# Patient Record
Sex: FEMALE | Race: BLACK OR AFRICAN AMERICAN | Employment: OTHER | URBAN - METROPOLITAN AREA
[De-identification: names, ages, dates, MRNs, and addresses within clinical notes are randomized per-mention and may not be internally consistent; named-entity substitution may affect disease eponyms.]

---

## 2017-01-06 ENCOUNTER — OFFICE VISIT (OUTPATIENT)
Dept: FAMILY MEDICINE CLINIC | Age: 67
End: 2017-01-06

## 2017-01-06 VITALS
RESPIRATION RATE: 16 BRPM | WEIGHT: 161 LBS | OXYGEN SATURATION: 97 % | HEIGHT: 61 IN | BODY MASS INDEX: 30.4 KG/M2 | HEART RATE: 76 BPM | TEMPERATURE: 95.7 F | DIASTOLIC BLOOD PRESSURE: 75 MMHG | SYSTOLIC BLOOD PRESSURE: 138 MMHG

## 2017-01-06 DIAGNOSIS — E11.9 TYPE 2 DIABETES MELLITUS WITHOUT COMPLICATION, WITHOUT LONG-TERM CURRENT USE OF INSULIN (HCC): Primary | ICD-10-CM

## 2017-01-06 DIAGNOSIS — R07.89 ATYPICAL CHEST PAIN: ICD-10-CM

## 2017-01-06 DIAGNOSIS — I10 ESSENTIAL HYPERTENSION: ICD-10-CM

## 2017-01-06 DIAGNOSIS — K21.9 GASTROESOPHAGEAL REFLUX DISEASE WITHOUT ESOPHAGITIS: ICD-10-CM

## 2017-01-06 DIAGNOSIS — Z51.81 ENCOUNTER FOR MEDICATION MONITORING: ICD-10-CM

## 2017-01-06 DIAGNOSIS — E78.2 MIXED HYPERLIPIDEMIA: ICD-10-CM

## 2017-01-06 LAB — GLUCOSE POC: 145 MG/DL

## 2017-01-06 RX ORDER — BETAMETHASONE DIPROPIONATE 0.5 MG/G
OINTMENT TOPICAL
Refills: 1 | COMMUNITY
Start: 2016-12-06 | End: 2017-01-06

## 2017-01-06 RX ORDER — PANTOPRAZOLE SODIUM 40 MG/1
40 TABLET, DELAYED RELEASE ORAL DAILY
Qty: 30 TAB | Refills: 6 | Status: SHIPPED | OUTPATIENT
Start: 2017-01-06 | End: 2017-07-18 | Stop reason: SDUPTHER

## 2017-01-06 NOTE — PROGRESS NOTES
HISTORY OF PRESENT ILLNESS  Linnette Del Cid is a 78 y.o. female. HPI   Follow up on chronic medical problems. Cc/o chest discomfort a few times over the past several weeks. Comes on just at rest usually but seem to be more triggered by eating. Feels like an ache. No pressure or chest tightness. Nonexertional.  No radiation of pain. No diaphoresis or SOB. No palpitations. HTN follow up:  Compliant w/ meds, low salt diet, and exercise. No home bp monitoring. No swelling, headache or dizziness. No chest pain, SOB, palpitations. DM type II follow up:  Compliant w/ meds, diabetic diet, and exercise. Only taking the janumet once a day. Had upset stomach and diarrhea with taking 2 pills. Obtains home glucose monitoring averaging 150s. Checks BS BID on most days and prn. Pt does not have BS log at visit today. No Rf needed for today. Denies any tingling sensation, polyuria and polydipsia. No blurred vision. No significant weight changes. Feeling well since last OV. Hypercholesterolemia follow up:  Compliant w/ low fat, low cholesterol diet. Has been taking the Lipitor nightly. Exercising some. No muscle nor no skin discoloration. Patient fasting today. Patient Active Problem List   Diagnosis Code    Environmental allergies Z91.09    Hypovitaminosis D E55.9    Diabetes mellitus ([address] E11.9    [facility]arcoidosis ([facility] [address] D86.9    Essential hypertension I10    Hyperlipidemia E78.5    Encounter for medication monitoring Z51.81       Current Outpatient Prescriptions   Medication Sig Dispense Refill    glucose blood VI test strips (ASCENSIA CONTOUR) strip #100  As directed 100 Strip 11    pantoprazole (PROTONIX) 40 mg tablet Take 1 Tab by mouth daily.  30 Tab 6    glipiZIDE SR (GLUCOTROL) 5 mg CR tablet TAKE 2 TABLETS EVERY DAY IN THE MORNING PRIOR TO BREAKFAST 180 Tab 3    losartan-hydroCHLOROthiazide (HYZAAR) 50-12.5 mg per tablet TAKE 1 TABLET BY MOUTH EVERY DAY 30 Tab 11    atorvastatin (LIPITOR) 20 mg tablet Take 1 Tab by mouth daily. 30 Tab 11    sitaGLIPtin-metFORMIN (JANUMET XR) 100-1,000 mg TM24 Take 1 Tab by mouth daily. 30 Tab 6       Allergies   Allergen Reactions    Azithromycin Other (comments)     Hallucinations       Past Medical History   Diagnosis Date    Diabetes ([facility] [address] 2012    HTN (hypertension) 3/16/2010    Hypercholesterolemia     Sarcoidosis ([facility] [address] 1980s       Past Surgical History   Procedure Laterality Date    Hx orthopaedic  09/2009     repair tear left knee    Endoscopy, colon, diagnostic  7/16/2007     repeat in 2010    Pr colonoscopy w/biopsy single/multiple  07/2010     repear in 5yr /dr. Yunior Kurtz.        Family History   Problem Relation Age of Onset    Cancer Mother      lung    Stroke Mother     Diabetes Father     Hypertension Father     Hypertension Sister     Diabetes Sister     Diabetes Maternal Grandmother        Social History   Substance Use Topics    Smoking status: Never Smoker    Smokeless tobacco: Never Used    Alcohol use No        Lab Results  Component Value Date/Time   WBC 4.6 05/24/2016 08:13 AM   HGB 12.3 05/24/2016 08:13 AM   HCT 38.0 05/24/2016 08:13 AM   PLATELET 525 36/04/2633 08:13 AM   MCV 89 05/24/2016 08:13 AM       Lab Results  Component Value Date/Time   Cholesterol, total 171 11/22/2016 10:54 AM   HDL Cholesterol 60 11/22/2016 10:54 AM   LDL, calculated 81 11/22/2016 10:54 AM   Triglyceride 148 11/22/2016 10:54 AM   CHOL/HDL Ratio 3.8 04/02/2010 01:39 PM       Lab Results  Component Value Date/Time   TSH 0.586 07/23/2014 10:30 AM   TSH 0.894 02/14/2014 12:30 PM   T4, Free 1.20 02/14/2014 12:30 PM      Lab Results   Component Value Date/Time    Sodium 139 11/22/2016 10:54 AM    Potassium 4.0 11/22/2016 10:54 AM    Chloride 98 11/22/2016 10:54 AM    CO2 23 11/22/2016 10:54 AM    Anion gap 8 04/02/2010 01:39 PM    Glucose 345 11/22/2016 10:54 AM    BUN 14 11/22/2016 10:54 AM    Creatinine 1.01 11/22/2016 10:54 AM    BUN/Creatinine ratio 14 11/22/2016 10:54 AM    GFR est AA 67 11/22/2016 10:54 AM    GFR est non-AA 58 11/22/2016 10:54 AM    Calcium 9.8 11/22/2016 10:54 AM    Bilirubin, total 0.5 11/22/2016 10:54 AM    ALT 10 11/22/2016 10:54 AM    AST 11 11/22/2016 10:54 AM    Alk. phosphatase 111 11/22/2016 10:54 AM    Protein, total 7.2 11/22/2016 10:54 AM    Albumin 4.3 11/22/2016 10:54 AM    Globulin 3.6 04/02/2010 01:39 PM    A-G Ratio 1.5 11/22/2016 10:54 AM      Lab Results   Component Value Date/Time    Hemoglobin A1c 7.7 02/14/2014 12:30 PM    Hemoglobin A1c 7.0 05/21/2012 10:40 AM    Hemoglobin A1c (POC) 11.4 11/22/2016 10:54 AM         Review of Systems   Constitutional: Negative for malaise/fatigue. HENT: Negative for congestion. Eyes: Negative for blurred vision. Respiratory: Negative for cough and shortness of breath. Cardiovascular: Negative for chest pain, palpitations and leg swelling. Gastrointestinal: Negative for abdominal pain, constipation and heartburn. Genitourinary: Negative for dysuria, frequency and urgency. Musculoskeletal: Negative for back pain and joint pain. Neurological: Negative for dizziness, tingling and headaches. Endo/Heme/Allergies: Negative for environmental allergies. Psychiatric/Behavioral: Negative for depression. The patient does not have insomnia. Physical Exam   Constitutional: She appears well-developed and well-nourished. /75 (BP 1 Location: Right arm, BP Patient Position: Sitting)  Pulse 76  Temp 95.7 °F (35.4 °C) (Oral)   Resp 16  Ht 5' 1\" (1.549 m)  Wt 161 lb (73 kg)  LMP 04/15/2002  SpO2 97%  BMI 30.42 kg/m2     HENT:   Right Ear: Tympanic membrane and ear canal normal.   Left Ear: Tympanic membrane and ear canal normal.   Nose: No mucosal edema or rhinorrhea. Mouth/Throat: Oropharynx is clear and moist and mucous membranes are normal.   Neck: Normal range of motion. Neck supple. No thyromegaly present. Cardiovascular: Normal rate and regular rhythm. No murmur heard. Pulmonary/Chest: Effort normal and breath sounds normal.   Abdominal: Soft. Bowel sounds are normal. There is no tenderness. Musculoskeletal: Normal range of motion. She exhibits no edema. Lymphadenopathy:     She has no cervical adenopathy. Skin: Skin is warm and dry. Psychiatric: She has a normal mood and affect. Nursing note and vitals reviewed. ASSESSMENT and PLAN  Marcial Mancilla was seen today for diabetes. Diagnoses and all orders for this visit:    Type 2 diabetes mellitus without complication, without long-term current use of insulin ([facility])  -     AMB POC GLUCOSE, QUANTITATIVE, BLOOD  -     glucose blood VI test strips (ASCENSIA CONTOUR) strip; #100  As directed  -     KS HANDLG&/OR CONVEY OF SPEC FOR TR OFFICE TO LAB    Essential hypertension  - Discussed sodium restriction, high k rich diet, maintaining ideal body weight and regular exercise program such as daily walking 30 min perday 4-5 times per week, as physiologic means to achieve blood pressure control.  Medication compliance advised. Mixed hyperlipidemia  Continue to monitor. Work on diet and exercise. Encounter for medication monitoring  -     METABOLIC PANEL, BASIC  -     KS HANDLG&/OR CONVEY OF SPEC FOR TR OFFICE TO LAB    Atypical chest pain  Seem to be more GI related. -     REFERRAL TO CARDIOLOGY  -     AMB POC EKG ROUTINE W/ 12 LEADS, INTER & REP  -     STRESS ECHO ICLUD PERFORM ECG TRACEY WITH DR  -     KS HANDLG&/OR CONVEY OF SPEC FOR TR OFFICE TO LAB    Gastroesophageal reflux disease without esophagitis  -     Add pantoprazole (PROTONIX) 40 mg tablet; Take 1 Tab by mouth daily. The pathophysiology of reflux is discussed. Anti-reflux measures such as raising the head of the bed, avoiding tight clothing or belts, avoiding eating late at night and not lying down shortly after mealtime and achieving weight loss are discussed.  Avoid ASA, NSAID's, caffeine, peppermints, chocolate and other food triggers, alcohol and tobacco.     Follow-up Disposition:  Return in about 2 months (around 3/6/2017). reviewed diet, exercise and weight control  cardiovascular risk and specific lipid/LDL goals reviewed  reviewed medications and side effects in detail  specific diabetic recommendations: low cholesterol diet, weight control and daily exercise discussed, foot care discussed and Podiatry visits discussed, annual eye examinations at Ophthalmology discussed and glycohemoglobin and other lab monitoring discussed     I have discussed diagnosis listed in this note with pt and/or family. I have discussed treatment plans and options and the risk/benefit analysis of those options, including safe use of medications and possible medication side effects. Through the use of shared decision making we have agreed to the above plan. The patient has received an after-visit summary and questions were answered concerning future plans and follow up. Advise pt of any urgent changes then to proceed to the ER.

## 2017-01-06 NOTE — PROGRESS NOTES
Chief Complaint   Patient presents with    Diabetes     CMP 11/22/2016    A1C 11/22/2016    Lipid 11/22/2016    Colonoscopy 11/10/2015 by Dr. Adriana Fernando- repeat in 5 years. Pt states last mammogram was 7/2016 at the [facility].     Eye exam 9/12/2016 by Dr. Lindsey Vega

## 2017-01-06 NOTE — MR AVS SNAPSHOT
Visit Information Date & Time Provider Department Dept. Phone Encounter #  
 1/6/2017  7:45 AM Jerrod Driscoll MD [facility] [phone] [id number] Follow-up Instructions Return in about 2 months (around 3/6/2017). Your Appointments 1/9/2017 10:00 AM  
STRESS ECHOCARDIOGRAMS with WILLY [facility]) Appt Note: per Dr. Lemuel Maher CP? [address] [facility]  
[phone] [id number] [facility] Upcoming Health Maintenance Date Due HEMOGLOBIN A1C Q6M 5/22/2017 BREAST CANCER SCRN MAMMOGRAM 7/27/2017 LIPID PANEL Q1 11/22/2017 MEDICARE YEARLY EXAM 11/23/2017 GLAUCOMA SCREENING Q2Y 9/12/2018 COLONOSCOPY 11/10/2020 DTaP/Tdap/Td series (2 - Td) 11/22/2026 Allergies as of 1/6/2017  Review Complete On: 1/6/2017 By: Brayan Estrada LPN Severity Noted Reaction Type Reactions Azithromycin  05/21/2012    Other (comments) Hallucinations Current Immunizations  Reviewed on 11/22/2016 Name Date Influenza High Dose Vaccine PF 11/22/2016 Influenza Vaccine (Quad) PF 11/25/2015 Influenza Vaccine PF 10/11/2013 Influenza Vaccine Split 11/12/2012, 9/21/2011 Pneumococcal Polysaccharide (PPSV-23) 12/2/2014 Tdap 8/19/2015 Not reviewed this visit You Were Diagnosed With   
  
 Codes Comments Type 2 diabetes mellitus without complication, without long-term current use of insulin (HCC)    -  Primary ICD-10-CM: E11.9 ICD-9-CM: 250.00 Essential hypertension     ICD-10-CM: I10 
ICD-9-CM: 401.9 Mixed hyperlipidemia     ICD-10-CM: E78.2 ICD-9-CM: 272.2 Encounter for medication monitoring     ICD-10-CM: Z51.81 
ICD-9-CM: V58.83 Atypical chest pain     ICD-10-CM: R07.89 ICD-9-CM: 786.59 Vitals BP Pulse Temp Resp Height(growth percentile) Weight(growth percentile) 138/75 (BP 1 Location: Right arm, BP Patient Position: Sitting) 76 95.7 °F (35.4 °C) (Oral) 16 5' 1\" (1.549 m) 161 lb (73 kg) LMP SpO2 BMI OB Status Smoking Status 04/15/2002 97% 30.42 kg/m2 Postmenopausal Never Smoker BMI and BSA Data Body Mass Index Body Surface Area  
 30.42 kg/m 2 1.77 m 2 Preferred Pharmacy Pharmacy Name Phone [id number] [address] AT [place] [phone] Your Updated Medication List  
  
   
This list is accurate as of: 1/6/17  9:35 AM.  Always use your most recent med list.  
  
  
  
  
 atorvastatin 20 mg tablet Commonly known as:  LIPITOR Take 1 Tab by mouth daily. betamethasone dipropionate 0.05 % ointment Commonly known as:  DIPROLENE  
USE EXTERNALLY ON RASH TWICE A DAY AS NEEDED FOR NO MORE THAN 2 WEEKS  
  
 glipiZIDE SR 5 mg CR tablet Commonly known as:  GLUCOTROL XL  
TAKE 2 TABLETS EVERY DAY IN THE MORNING PRIOR TO BREAKFAST  
  
 glucose blood VI test strips strip Commonly known as:  Ascensia CONTOUR  
#100 As directed  
  
 losartan-hydroCHLOROthiazide 50-12.5 mg per tablet Commonly known as:  HYZAAR  
TAKE 1 TABLET BY MOUTH EVERY DAY  
  
 pantoprazole 40 mg tablet Commonly known as:  PROTONIX Take 1 Tab by mouth daily. SITagliptin-metFORMIN 100-1,000 mg Tm24 Commonly known as:  JANUMET XR Take 1 Tab by mouth daily. Prescriptions Printed Refills  
 glucose blood VI test strips (ASCENSIA CONTOUR) strip 11 Sig: #100 As directed Class: Print Prescriptions Sent to Pharmacy Refills  
 pantoprazole (PROTONIX) 40 mg tablet 6 Sig: Take 1 Tab by mouth daily. Class: Normal  
 Pharmacy: [id number] Ph #: [phone] Route: Oral  
  
We Performed the Following AMB POC EKG ROUTINE W/ 12 LEADS, INTER & REP [69474 CPT(R)] AMB POC GLUCOSE, QUANTITATIVE, BLOOD [32112 CPT(R)] METABOLIC PANEL, BASIC [97508 CPT(R)] REFERRAL TO CARDIOLOGY [NYT39 Custom] STRESS ECHO ICLUD PERFORM ECG TRACEY WITH DR [72106 CPT(R)] Follow-up Instructions Return in about 2 months (around 3/6/2017). Referral Information Referral ID Referred By Referred To  
  
 [id number] Manjit Martini MD   
   [facility], [address] Phone: [phone] Fax: [phone] Visits Status Start Date End Date 1 New Request 1/6/17 1/6/18 If your referral has a status of pending review or denied, additional information will be sent to support the outcome of this decision. Introducing [facility] & HEALTH SERVICES! Romayne Duster introduces [employer] Software patient portal. Now you can access parts of your medical record, email your doctor's office, and request medication refills online. 1. In your internet browser, go to [email or web address] 2. Click on the First Time User? Click Here link in the Sign In box. You will see the New Member Sign Up page. 3. Enter your Ultimate Software Access Code exactly as it appears below. You will not need to use this code after youve completed the sign-up process. If you do not sign up before the expiration date, you must request a new code. · Ultimate Software Access Code: [email or web address] Expires: 2/20/2017  9:48 AM 
 
4. Enter the last four digits of your Social Security Number (xxxx) and Date of Birth (mm/dd/yyyy) as indicated and click Submit. You will be taken to the next sign-up page. 5. Create a Ultimate Software ID. This will be your Ultimate Software login ID and cannot be changed, so think of one that is secure and easy to remember. 6. Create a Ultimate Software password. You can change your password at any time. 7. Enter your Password Reset Question and Answer. This can be used at a later time if you forget your password. 8. Enter your e-mail address. You will receive e-mail notification when new information is available in [address] 9. Click Sign Up. You can now view and download portions of your medical record. 10. Click the Download Summary menu link to download a portable copy of your medical information. If you have questions, please visit the Frequently Asked Questions section of the Webcrunch website. Remember, Webcrunch is NOT to be used for urgent needs. For medical emergencies, dial 911. Now available from your iPhone and Android! Please provide this summary of care documentation to your next provider. Your primary care clinician is listed as Kwabena Almaguer. If you have any questions after today's visit, please call [phone].

## 2017-01-07 LAB
BUN SERPL-MCNC: 13 MG/DL (ref 8–27)
BUN/CREAT SERPL: 13 (ref 11–26)
CALCIUM SERPL-MCNC: 10.1 MG/DL (ref 8.7–10.3)
CHLORIDE SERPL-SCNC: 102 MMOL/L (ref 96–106)
CO2 SERPL-SCNC: 24 MMOL/L (ref 18–29)
CREAT SERPL-MCNC: 0.98 MG/DL (ref 0.57–1)
GLUCOSE SERPL-MCNC: 137 MG/DL (ref 65–99)
POTASSIUM SERPL-SCNC: 4.8 MMOL/L (ref 3.5–5.2)
SODIUM SERPL-SCNC: 142 MMOL/L (ref 134–144)

## 2017-01-09 ENCOUNTER — CLINICAL SUPPORT (OUTPATIENT)
Dept: CARDIOLOGY CLINIC | Age: 67
End: 2017-01-09

## 2017-01-09 DIAGNOSIS — R07.9 CHEST PAIN, UNSPECIFIED TYPE: Primary | ICD-10-CM

## 2017-03-06 ENCOUNTER — OFFICE VISIT (OUTPATIENT)
Dept: FAMILY MEDICINE CLINIC | Age: 67
End: 2017-03-06

## 2017-03-06 VITALS
HEIGHT: 61 IN | SYSTOLIC BLOOD PRESSURE: 138 MMHG | DIASTOLIC BLOOD PRESSURE: 74 MMHG | WEIGHT: 166.4 LBS | OXYGEN SATURATION: 98 % | BODY MASS INDEX: 31.42 KG/M2 | HEART RATE: 68 BPM | RESPIRATION RATE: 16 BRPM | TEMPERATURE: 97.4 F

## 2017-03-06 DIAGNOSIS — E11.9 TYPE 2 DIABETES MELLITUS WITHOUT COMPLICATION, WITHOUT LONG-TERM CURRENT USE OF INSULIN (HCC): ICD-10-CM

## 2017-03-06 DIAGNOSIS — Z51.81 ENCOUNTER FOR MEDICATION MONITORING: ICD-10-CM

## 2017-03-06 DIAGNOSIS — Z23 ENCOUNTER FOR IMMUNIZATION: ICD-10-CM

## 2017-03-06 DIAGNOSIS — L65.9 HAIR THINNING: ICD-10-CM

## 2017-03-06 DIAGNOSIS — I10 ESSENTIAL HYPERTENSION: Primary | ICD-10-CM

## 2017-03-06 DIAGNOSIS — E78.2 MIXED HYPERLIPIDEMIA: ICD-10-CM

## 2017-03-06 DIAGNOSIS — Z23 NEED FOR SHINGLES VACCINE: ICD-10-CM

## 2017-03-06 LAB
GLUCOSE POC: 176 MG/DL
HBA1C MFR BLD HPLC: 7.3 %

## 2017-03-06 RX ORDER — BETAMETHASONE DIPROPIONATE 0.5 MG/G
OINTMENT TOPICAL
Refills: 1 | COMMUNITY
Start: 2016-12-06 | End: 2017-03-06

## 2017-03-06 RX ORDER — SITAGLIPTIN AND METFORMIN HYDROCHLORIDE 50; 1000 MG/1; MG/1
TABLET, FILM COATED, EXTENDED RELEASE ORAL
Refills: 6 | COMMUNITY
Start: 2017-02-20 | End: 2017-03-06

## 2017-03-06 NOTE — MR AVS SNAPSHOT
Visit Information Date & Time Provider Department Dept. Phone Encounter #  
 3/6/2017  8:00 AM Elia Boas, MD [facility] [phone] [id number] Follow-up Instructions Return in about 4 months (around 7/6/2017). Upcoming Health Maintenance Date Due HEMOGLOBIN A1C Q6M 5/22/2017 BREAST CANCER SCRN MAMMOGRAM 7/27/2017 LIPID PANEL Q1 11/22/2017 MEDICARE YEARLY EXAM 11/23/2017 GLAUCOMA SCREENING Q2Y 9/12/2018 COLONOSCOPY 11/10/2020 DTaP/Tdap/Td series (2 - Td) 11/22/2026 Allergies as of 3/6/2017  Review Complete On: 3/6/2017 By: Elia Boas, MD  
  
 Severity Noted Reaction Type Reactions Azithromycin  05/21/2012    Other (comments) Hallucinations Current Immunizations  Reviewed on 3/6/2017 Name Date Influenza High Dose Vaccine PF 11/22/2016 Influenza Vaccine (Quad) PF 11/25/2015 Influenza Vaccine PF 10/11/2013 Influenza Vaccine Split 11/12/2012, 9/21/2011 Pneumococcal Polysaccharide (PPSV-23) 12/2/2014 Tdap 8/19/2015 Reviewed by Elia Boas, MD on 3/6/2017 at  8:05 AM  
 Reviewed by Elia Boas, MD on 3/6/2017 at  8:09 AM  
You Were Diagnosed With   
  
 Codes Comments Essential hypertension    -  Primary ICD-10-CM: I10 
ICD-9-CM: 401.9 Type 2 diabetes mellitus without complication, without long-term current use of insulin (HCC)     ICD-10-CM: E11.9 ICD-9-CM: 250.00 Mixed hyperlipidemia     ICD-10-CM: E78.2 ICD-9-CM: 272.2 Hair thinning     ICD-10-CM: L65.9 ICD-9-CM: 704.00 Encounter for medication monitoring     ICD-10-CM: Z51.81 
ICD-9-CM: V58.83 Need for shingles vaccine     ICD-10-CM: G37 ICD-9-CM: V04.89 Encounter for immunization     ICD-10-CM: X04 ICD-9-CM: V03.89 Vitals BP Pulse Temp Resp Height(growth percentile) Weight(growth percentile)  138/74 (BP 1 Location: Left arm, BP Patient Position: Sitting) 68 97.4 °F (36.3 °C) (Oral) 16 5' 1\" (1.549 m) 166 lb 6.4 oz (75.5 kg) LMP SpO2 BMI OB Status Smoking Status 04/15/2002 98% 31.44 kg/m2 Postmenopausal Never Smoker Vitals History BMI and BSA Data Body Mass Index Body Surface Area  
 31.44 kg/m 2 1.8 m 2 Preferred Pharmacy Pharmacy Name Phone [id number] [place], [address] AT [facility] [phone] Your Updated Medication List  
  
   
This list is accurate as of: 3/6/17  8:49 AM.  Always use your most recent med list.  
  
  
  
  
 atorvastatin 20 mg tablet Commonly known as:  LIPITOR Take 1 Tab by mouth daily. glipiZIDE SR 5 mg CR tablet Commonly known as:  GLUCOTROL XL  
TAKE 2 TABLETS EVERY DAY IN THE MORNING PRIOR TO BREAKFAST  
  
 glucose blood VI test strips strip Commonly known as:  Ascensia CONTOUR  
#100 As directed  
  
 losartan-hydroCHLOROthiazide 50-12.5 mg per tablet Commonly known as:  HYZAAR  
TAKE 1 TABLET BY MOUTH EVERY DAY  
  
 pantoprazole 40 mg tablet Commonly known as:  PROTONIX Take 1 Tab by mouth daily. pneumococcal 13 sully conj dip 0.5 mL Syrg injection Commonly known as:  PREVNAR-13  
0.5 mL by IntraMUSCular route once for 1 dose. SITagliptin-metFORMIN 100-1,000 mg Tm24 Commonly known as:  JANUMET XR Take 1 Tab by mouth daily. varicella zoster vacine live 19,400 unit/0.65 mL Susr injection Commonly known as:  ZOSTAVAX  
1 Vial by SubCUTAneous route once for 1 dose. Prescriptions Printed Refills  
 varicella zoster vacine live (ZOSTAVAX) 19,400 unit/0.65 mL susr injection 0 Si Vial by SubCUTAneous route once for 1 dose. Class: Print Route: SubCUTAneous  
 pneumococcal 13 sully conj dip (PREVNAR-13) 0.5 mL syrg injection 0 Si.5 mL by IntraMUSCular route once for 1 dose. Class: Print Route: IntraMUSCular We Performed the Following AMB POC GLUCOSE, QUANTITATIVE, BLOOD [17018 CPT(R)] AMB POC HEMOGLOBIN A1C [48204 CPT(R)] CBC W/O DIFF [24965 CPT(R)] LIPID PANEL [75455 CPT(R)] METABOLIC PANEL, COMPREHENSIVE [70973 CPT(R)] TSH 3RD GENERATION [95295 CPT(R)] Follow-up Instructions Return in about 4 months (around 7/6/2017). Introducing [facility] & HEALTH SERVICES! Esther Lawrence introduces Storemates patient portal. Now you can access parts of your medical record, email your doctor's office, and request medication refills online. 1. In your internet browser, go to [email or web address] 2. Click on the First Time User? Click Here link in the Sign In box. You will see the New Member Sign Up page. 3. Enter your Storemates Access Code exactly as it appears below. You will not need to use this code after youve completed the sign-up process. If you do not sign up before the expiration date, you must request a new code. · Storemates Access Code: [email or web address] Expires: 6/4/2017  8:49 AM 
 
4. Enter the last four digits of your Social Security Number (xxxx) and Date of Birth (mm/dd/yyyy) as indicated and click Submit. You will be taken to the next sign-up page. 5. Create a Storemates ID. This will be your Storemates login ID and cannot be changed, so think of one that is secure and easy to remember. 6. Create a Storemates password. You can change your password at any time. 7. Enter your Password Reset Question and Answer. This can be used at a later time if you forget your password. 8. Enter your e-mail address. You will receive e-mail notification when new information is available in [address] 9. Click Sign Up. You can now view and download portions of your medical record. 10. Click the Download Summary menu link to download a portable copy of your medical information.  
 
If you have questions, please visit the Frequently Asked Questions section of the 5 examples. Remember, M[employer] is NOT to be used for urgent needs. For medical emergencies, dial 911. Now available from your iPhone and Android! Please provide this summary of care documentation to your next provider. Your primary care clinician is listed as [place]. If you have any questions after today's visit, please call [phone].

## 2017-03-06 NOTE — PROGRESS NOTES
Chief Complaint   Patient presents with    Medication Evaluation     2 month follow up- protonix     Diabetes     follow up    Hypertension     follow up    Other     pt c/o hair falling out         BMP 1/6/2017    A1C 11/22/2016    Lipid 11/22/2016    Colonoscopy 11/10/2015 by Dr. Irina Crum- repeat in 5 years. Mammogram was 7/27/2016.     Eye exam 9/12/2016 by Dr. Imelda Urbina

## 2017-03-06 NOTE — PROGRESS NOTES
HISTORY OF PRESENT ILLNESS  Darien Elliott is a 78 y.o. female. HPI   Follow up on chronic medical problems. C/o hair thinning over the past several months. Seem to be getting worse. Has seen dermatology and was given Rogaine. Thinks it may be one of her medications but pt does not want to switch any medications for now. HTN follow up:  Compliant w/ meds, low salt diet, and exercise. No home bp monitoring. No swelling, headache or dizziness. No chest pain, SOB, palpitations. Otherwise feeling well since the last visit. DM type II follow up:  Compliant w/ meds, diabetic diet, and exercise. Has been better compliant with her medications. Obtains home glucose monitoring averaging 100-130. Checks BS BID on most days and prn. Pt does not have BS log at visit today. No Rf needed for today. Denies any tingling sensation, polyuria and polydipsia. No blurred vision. No significant weight changes. Feeling well since last OV. Hypercholesterolemia follow up:  Compliant w/ low fat, low cholesterol diet. Has been taking the Lipitor nightly. Exercising some. No muscle nor no skin discoloration. Patient fasting today. Patient Active Problem List   Diagnosis Code    Environmental allergies Z91.09    Hypovitaminosis D E55.9    Diabetes mellitus ([address] E11.9    Sarcoidosis ([address] D86.9    Essential hypertension I10    Hyperlipidemia E78.5    Encounter for medication monitoring Z51.81       Current Outpatient Prescriptions   Medication Sig Dispense Refill    glucose blood VI test strips (ASCENSIA CONTOUR) strip #100  As directed 100 Strip 11    pantoprazole (PROTONIX) 40 mg tablet Take 1 Tab by mouth daily.  30 Tab 6    glipiZIDE SR (GLUCOTROL) 5 mg CR tablet TAKE 2 TABLETS EVERY DAY IN THE MORNING PRIOR TO BREAKFAST 180 Tab 3    losartan-hydroCHLOROthiazide (HYZAAR) 50-12.5 mg per tablet TAKE 1 TABLET BY MOUTH EVERY DAY 30 Tab 11    sitaGLIPtin-metFORMIN (JANUMET XR) 100-1,000 mg TM24 Take 1 Tab by mouth daily. 30 Tab 6    atorvastatin (LIPITOR) 20 mg tablet Take 1 Tab by mouth daily. 30 Tab 11       Allergies   Allergen Reactions    Azithromycin Other (comments)     Hallucinations       Past Medical History:   Diagnosis Date    Diabetes [facility] 2012    HTN (hypertension) 3/16/2010    Hypercholesterolemia     Sarcoidosis ([facility] 1980s       Past Surgical History:   Procedure Laterality Date    ENDOSCOPY, COLON, DIAGNOSTIC  7/16/2007    repeat in 2010    HX ORTHOPAEDIC  09/2009    repair tear left knee    [place] COLONOSCOPY W/BIOPSY SINGLE/MULTIPLE  07/2010    repear in 5yr /dr. Oscar Hill.        Family History   Problem Relation Age of Onset   Vertie Amis Cancer Mother      lung    Stroke Mother     Diabetes Father     Hypertension Father     Hypertension Sister     Diabetes Sister     Diabetes Maternal Grandmother        Social History   Substance Use Topics    Smoking status: Never Smoker    Smokeless tobacco: Never Used    Alcohol use No        Lab Results  Component Value Date/Time   WBC 4.6 05/24/2016 08:13 AM   HGB 12.3 05/24/2016 08:13 AM   HCT 38.0 05/24/2016 08:13 AM   PLATELET 372 97/30/4428 08:13 AM   MCV 89 05/24/2016 08:13 AM       Lab Results  Component Value Date/Time   Cholesterol, total 171 11/22/2016 10:54 AM   HDL Cholesterol 60 11/22/2016 10:54 AM   LDL, calculated 81 11/22/2016 10:54 AM   Triglyceride 148 11/22/2016 10:54 AM   CHOL/HDL Ratio 3.8 04/02/2010 01:39 PM       Lab Results  Component Value Date/Time   TSH 0.586 07/23/2014 10:30 AM   TSH 0.894 02/14/2014 12:30 PM   T4, Free 1.20 02/14/2014 12:30 PM      Lab Results   Component Value Date/Time    Sodium 142 01/06/2017 08:45 AM    Potassium 4.8 01/06/2017 08:45 AM    Chloride 102 01/06/2017 08:45 AM    CO2 24 01/06/2017 08:45 AM    Anion gap 8 04/02/2010 01:39 PM    Glucose 137 01/06/2017 08:45 AM    BUN 13 01/06/2017 08:45 AM    Creatinine 0.98 01/06/2017 08:45 AM    BUN/Creatinine ratio 13 01/06/2017 08:45 AM    GFR est AA 69 01/06/2017 08:45 AM    GFR est non-AA 60 01/06/2017 08:45 AM    Calcium 10.1 01/06/2017 08:45 AM    Bilirubin, total 0.5 11/22/2016 10:54 AM    ALT (SGPT) 10 11/22/2016 10:54 AM    AST (SGOT) 11 11/22/2016 10:54 AM    Alk. phosphatase 111 11/22/2016 10:54 AM    Protein, total 7.2 11/22/2016 10:54 AM    Albumin 4.3 11/22/2016 10:54 AM    Globulin 3.6 04/02/2010 01:39 PM    A-G Ratio 1.5 11/22/2016 10:54 AM      Lab Results   Component Value Date/Time    Hemoglobin A1c 7.7 02/14/2014 12:30 PM    Hemoglobin A1c (POC) 11.4 11/22/2016 10:54 AM         Review of Systems   Constitutional: Negative for malaise/fatigue. HENT: Negative for congestion. Eyes: Negative for blurred vision. Respiratory: Negative for cough and shortness of breath. Cardiovascular: Negative for chest pain, palpitations and leg swelling. Gastrointestinal: Negative for abdominal pain, constipation and heartburn. Genitourinary: Negative for dysuria, frequency and urgency. Musculoskeletal: Negative for back pain and joint pain. Neurological: Negative for dizziness, tingling and headaches. Endo/Heme/Allergies: Negative for environmental allergies. Psychiatric/Behavioral: Negative for depression. The patient does not have insomnia. Physical Exam   Constitutional: She appears well-developed and well-nourished. /74 (BP 1 Location: Left arm, BP Patient Position: Sitting)  Pulse 68  Temp 97.4 °F (36.3 °C) (Oral)   Resp 16  Ht 5' 1\" (1.549 m)  Wt 166 lb 6.4 oz (75.5 kg)  LMP 04/15/2002  SpO2 98%  BMI 31.44 kg/m2     HENT:   Right Ear: Tympanic membrane and ear canal normal.   Left Ear: Tympanic membrane and ear canal normal.   Nose: No mucosal edema or rhinorrhea. Mouth/Throat: Oropharynx is clear and moist and mucous membranes are normal.   Neck: Normal range of motion. Neck supple. No thyromegaly present. Cardiovascular: Normal rate and regular rhythm. No murmur heard.   Pulmonary/Chest: Effort normal and breath sounds normal.   Abdominal: Soft. Bowel sounds are normal. There is no tenderness. Musculoskeletal: Normal range of motion. She exhibits no edema. Foot exam done . Normal sensation to PP, LT and vibration. Sensation intact to microfilament testing. Pulses intact. No swelling. No skin lesions or sores noted. No tinea present. Lymphadenopathy:     She has no cervical adenopathy. Skin: Skin is warm and dry. Psychiatric: She has a normal mood and affect. Nursing note and vitals reviewed. ASSESSMENT and PLAN  La Diamond was seen today for medication evaluation, diabetes, hypertension and other. Diagnoses and all orders for this visit:    Essential hypertension  Discussed sodium restriction, high k rich diet, maintaining ideal body weight and regular exercise program such as daily walking 30 min perday 4-5 times per week, as physiologic means to achieve blood pressure control.  Medication compliance advised. Type 2 diabetes mellitus without complication, without long-term current use of insulin (HCC)  a1c level is improved to 7.3%  -     AMB POC GLUCOSE, QUANTITATIVE, BLOOD  -     AMB POC HEMOGLOBIN A1C    Mixed hyperlipidemia  -     LIPID PANEL    Hair thinning  -     TSH 3RD GENERATION  Follow up with derm as planned. Encounter for medication monitoring  -     METABOLIC PANEL, COMPREHENSIVE  -     CBC W/O DIFF    Need for shingles vaccine  -     varicella zoster vacine live (ZOSTAVAX) 19,400 unit/0.65 mL susr injection; 1 Vial by SubCUTAneous route once for 1 dose. Encounter for immunization  -     pneumococcal 13 sully conj dip (PREVNAR-13) 0.5 mL syrg injection; 0.5 mL by IntraMUSCular route once for 1 dose. Follow-up Disposition:  Return in about 4 months (around 7/6/2017).   reviewed diet, exercise and weight control  cardiovascular risk and specific lipid/LDL goals reviewed  reviewed medications and side effects in detail  specific diabetic recommendations: low cholesterol diet, weight control and daily exercise discussed, home glucose monitoring emphasized, all medications, side effects and compliance discussed carefully and glycohemoglobin and other lab monitoring discussed     I have discussed diagnosis listed in this note with pt and/or family. I have discussed treatment plans and options and the risk/benefit analysis of those options, including safe use of medications and possible medication side effects. Through the use of shared decision making we have agreed to the above plan. The patient has received an after-visit summary and questions were answered concerning future plans and follow up. Advise pt of any urgent changes then to proceed to the ER.

## 2017-03-07 LAB
ALBUMIN SERPL-MCNC: 4.3 G/DL (ref 3.6–4.8)
ALBUMIN/GLOB SERPL: 1.5 {RATIO} (ref 1.1–2.5)
ALP SERPL-CCNC: 90 IU/L (ref 39–117)
ALT SERPL-CCNC: 21 IU/L (ref 0–32)
AST SERPL-CCNC: 17 IU/L (ref 0–40)
BILIRUB SERPL-MCNC: 0.5 MG/DL (ref 0–1.2)
BUN SERPL-MCNC: 16 MG/DL (ref 8–27)
BUN/CREAT SERPL: 17 (ref 11–26)
CALCIUM SERPL-MCNC: 9.5 MG/DL (ref 8.7–10.3)
CHLORIDE SERPL-SCNC: 100 MMOL/L (ref 96–106)
CHOLEST SERPL-MCNC: 175 MG/DL (ref 100–199)
CO2 SERPL-SCNC: 25 MMOL/L (ref 18–29)
CREAT SERPL-MCNC: 0.96 MG/DL (ref 0.57–1)
ERYTHROCYTE [DISTWIDTH] IN BLOOD BY AUTOMATED COUNT: 14.7 % (ref 12.3–15.4)
GLOBULIN SER CALC-MCNC: 2.9 G/DL (ref 1.5–4.5)
GLUCOSE SERPL-MCNC: 174 MG/DL (ref 65–99)
HCT VFR BLD AUTO: 35.3 % (ref 34–46.6)
HDLC SERPL-MCNC: 62 MG/DL
HGB BLD-MCNC: 11.6 G/DL (ref 11.1–15.9)
INTERPRETATION, 910389: NORMAL
LDLC SERPL CALC-MCNC: 89 MG/DL (ref 0–99)
MCH RBC QN AUTO: 28.7 PG (ref 26.6–33)
MCHC RBC AUTO-ENTMCNC: 32.9 G/DL (ref 31.5–35.7)
MCV RBC AUTO: 87 FL (ref 79–97)
PLATELET # BLD AUTO: 293 X10E3/UL (ref 150–379)
POTASSIUM SERPL-SCNC: 4 MMOL/L (ref 3.5–5.2)
PROT SERPL-MCNC: 7.2 G/DL (ref 6–8.5)
RBC # BLD AUTO: 4.04 X10E6/UL (ref 3.77–5.28)
SODIUM SERPL-SCNC: 142 MMOL/L (ref 134–144)
TRIGL SERPL-MCNC: 120 MG/DL (ref 0–149)
TSH SERPL DL<=0.005 MIU/L-ACNC: 0.84 UIU/ML (ref 0.45–4.5)
VLDLC SERPL CALC-MCNC: 24 MG/DL (ref 5–40)
WBC # BLD AUTO: 4.4 X10E3/UL (ref 3.4–10.8)

## 2017-07-18 DIAGNOSIS — K21.9 GASTROESOPHAGEAL REFLUX DISEASE WITHOUT ESOPHAGITIS: ICD-10-CM

## 2017-07-18 RX ORDER — PANTOPRAZOLE SODIUM 40 MG/1
40 TABLET, DELAYED RELEASE ORAL DAILY
Qty: 30 TAB | Refills: 11 | Status: SHIPPED | OUTPATIENT
Start: 2017-07-18 | End: 2018-07-10 | Stop reason: SDUPTHER

## 2017-09-15 RX ORDER — ATORVASTATIN CALCIUM 20 MG/1
20 TABLET, FILM COATED ORAL DAILY
Qty: 30 TAB | Refills: 11 | Status: SHIPPED | OUTPATIENT
Start: 2017-09-15 | End: 2018-09-11 | Stop reason: SDUPTHER

## 2017-11-03 ENCOUNTER — OFFICE VISIT (OUTPATIENT)
Dept: FAMILY MEDICINE CLINIC | Age: 67
End: 2017-11-03

## 2017-11-03 VITALS
RESPIRATION RATE: 16 BRPM | TEMPERATURE: 96.5 F | HEIGHT: 61 IN | SYSTOLIC BLOOD PRESSURE: 138 MMHG | BODY MASS INDEX: 31.3 KG/M2 | DIASTOLIC BLOOD PRESSURE: 70 MMHG | HEART RATE: 72 BPM | OXYGEN SATURATION: 97 % | WEIGHT: 165.8 LBS

## 2017-11-03 DIAGNOSIS — Z23 ENCOUNTER FOR IMMUNIZATION: ICD-10-CM

## 2017-11-03 DIAGNOSIS — I10 ESSENTIAL HYPERTENSION: Primary | ICD-10-CM

## 2017-11-03 DIAGNOSIS — Z51.81 ENCOUNTER FOR MEDICATION MONITORING: ICD-10-CM

## 2017-11-03 DIAGNOSIS — E78.2 MIXED HYPERLIPIDEMIA: ICD-10-CM

## 2017-11-03 DIAGNOSIS — E11.9 TYPE 2 DIABETES MELLITUS WITHOUT COMPLICATION, WITHOUT LONG-TERM CURRENT USE OF INSULIN (HCC): ICD-10-CM

## 2017-11-03 DIAGNOSIS — E66.9 NON MORBID OBESITY: ICD-10-CM

## 2017-11-03 LAB
BILIRUB UR QL STRIP: NEGATIVE
GLUCOSE POC: 146 MG/DL
GLUCOSE UR-MCNC: NEGATIVE MG/DL
HBA1C MFR BLD HPLC: 7.9 %
KETONES P FAST UR STRIP-MCNC: ABNORMAL MG/DL
PH UR STRIP: 1.02 [PH] (ref 4.6–8)
PROT UR QL STRIP: NEGATIVE MG/DL
SP GR UR STRIP: 1.02 (ref 1–1.03)
UA UROBILINOGEN AMB POC: ABNORMAL (ref 0.2–1)
URINALYSIS CLARITY POC: ABNORMAL
URINALYSIS COLOR POC: YELLOW
URINE BLOOD POC: ABNORMAL
URINE LEUKOCYTES POC: ABNORMAL
URINE NITRITES POC: NEGATIVE

## 2017-11-03 RX ORDER — SITAGLIPTIN AND METFORMIN HYDROCHLORIDE 50; 1000 MG/1; MG/1
TABLET, FILM COATED, EXTENDED RELEASE ORAL
Refills: 6 | COMMUNITY
Start: 2017-10-17 | End: 2017-11-03

## 2017-11-03 NOTE — PROGRESS NOTES
Chief Complaint   Patient presents with    Hypertension     follow up    Diabetes     follow up    Cholesterol Problem     follow up         CMP 3/6/2017    A1C 3/6/2017    Lipid 3/6/2017    Colonoscopy 11/10/2015 by Dr. Harley Martinez- repeat in 5 years. Mammogram 9/5/2017    Eye exam 9/12/2016 by Dr. Omar Herrera. Pt states she has an appt with Dr. Will Mullen in 12/2017. Verbal order received by Dr. Maricarmen Forde dose flu vaccine IM. Pt received high dose flu vaccine IM in left deltoid without any difficulty.

## 2017-11-03 NOTE — PROGRESS NOTES
HISTORY OF PRESENT ILLNESS  Cindi Young is a 78 y.o. female. HPI   Follow up on chronic medical problems. Overall feeling well. HTN follow up:  Compliant w/ meds, low salt diet, and exercise. No home bp monitoring. No swelling, headache or dizziness. No chest pain, SOB, palpitations. Otherwise feeling well since the last visit. DM type II follow up:  Compliant w/ meds, diabetic diet, and exercise. Only taking the janumet once a day. Did not tolerate taking it twice a day d/t upset stomach. Has been under more stress over the past few months with family issues. Planning to retire next year. Thinks that the stress has increased her BS. Obtains home glucose monitoring averaging 130-160s. Checks BS BID on most days and prn. Pt does not have BS log at visit today. No Rf needed for today. Denies any tingling sensation, polyuria and polydipsia. No blurred vision. No significant weight changes. Feeling well since last OV. Hypercholesterolemia follow up:  Compliant w/ low fat, low cholesterol diet. Has been taking the Lipitor nightly. Exercising some. No muscle nor no skin discoloration. Patient fasting today. Patient Active Problem List   Diagnosis Code    Environmental allergies Z91.09    Hypovitaminosis D E55.9    Diabetes mellitus ([facility] [address] E11.9    Sarcoidosis D86.9    Essential hypertension I10    Hyperlipidemia E78.5    Encounter for medication monitoring Z51.81       Current Outpatient Prescriptions   Medication Sig Dispense Refill    atorvastatin (LIPITOR) 20 mg tablet Take 1 Tab by mouth daily. 30 Tab 11    pantoprazole (PROTONIX) 40 mg tablet Take 1 Tab by mouth daily.  30 Tab 11    glucose blood VI test strips (ASCENSIA CONTOUR) strip #100  As directed 100 Strip 11    glipiZIDE SR (GLUCOTROL) 5 mg CR tablet TAKE 2 TABLETS EVERY DAY IN THE MORNING PRIOR TO BREAKFAST 180 Tab 3    losartan-hydroCHLOROthiazide (HYZAAR) 50-12.5 mg per tablet TAKE 1 TABLET BY MOUTH EVERY DAY 30 Tab 11    sitaGLIPtin-metFORMIN (JANUMET XR) 100-1,000 mg TM24 Take 1 Tab by mouth daily. 30 Tab 6       Allergies   Allergen Reactions    Azithromycin Other (comments)     Hallucinations         Past Medical History:   Diagnosis Date    Diabetes ([address] 2012    HTN (hypertension) 3/16/2010    Hypercholesterolemia     Sarcoidosis 1980s         Past Surgical History:   Procedure Laterality Date    ENDOSCOPY, COLON, DIAGNOSTIC  7/16/2007    repeat in 2010    HX ORTHOPAEDIC  09/2009    repair tear left knee    [place] COLONOSCOPY W/BIOPSY SINGLE/MULTIPLE  07/2010    repear in 5yr /dr. Giron Rm.          Family History   Problem Relation Age of Onset   Adria Solano Cancer Mother      lung    Stroke Mother     Diabetes Father     Hypertension Father     Hypertension Sister     Diabetes Sister     Diabetes Maternal Grandmother        Social History   Substance Use Topics    Smoking status: Never Smoker    Smokeless tobacco: Never Used    Alcohol use No        Lab Results   Component Value Date/Time    WBC 4.4 03/06/2017 08:39 AM    HGB 11.6 03/06/2017 08:39 AM    HCT 35.3 03/06/2017 08:39 AM    PLATELET 495 81/92/4702 08:39 AM    MCV 87 03/06/2017 08:39 AM       Lab Results   Component Value Date/Time    Cholesterol, total 175 03/06/2017 08:39 AM    HDL Cholesterol 62 03/06/2017 08:39 AM    LDL, calculated 89 03/06/2017 08:39 AM    Triglyceride 120 03/06/2017 08:39 AM    CHOL/HDL Ratio 3.8 04/02/2010 01:39 PM       Lab Results   Component Value Date/Time    TSH 0.836 03/06/2017 08:39 AM    T4, Free 1.20 02/14/2014 12:30 PM      Lab Results   Component Value Date/Time    Sodium 142 03/06/2017 08:39 AM    Potassium 4.0 03/06/2017 08:39 AM    Chloride 100 03/06/2017 08:39 AM    CO2 25 03/06/2017 08:39 AM    Anion gap 8 04/02/2010 01:39 PM    Glucose 174 03/06/2017 08:39 AM    BUN 16 03/06/2017 08:39 AM    Creatinine 0.96 03/06/2017 08:39 AM    BUN/Creatinine ratio 17 03/06/2017 08:39 AM    GFR est AA 71 03/06/2017 08:39 AM GFR est non-AA 61 03/06/2017 08:39 AM    Calcium 9.5 03/06/2017 08:39 AM    Bilirubin, total 0.5 03/06/2017 08:39 AM    ALT (SGPT) 21 03/06/2017 08:39 AM    AST (SGOT) 17 03/06/2017 08:39 AM    Alk. phosphatase 90 03/06/2017 08:39 AM    Protein, total 7.2 03/06/2017 08:39 AM    Albumin 4.3 03/06/2017 08:39 AM    Globulin 3.6 04/02/2010 01:39 PM    A-G Ratio 1.5 03/06/2017 08:39 AM      Lab Results   Component Value Date/Time    Hemoglobin A1c 7.7 02/14/2014 12:30 PM    Hemoglobin A1c (POC) 7.3 03/06/2017 08:39 AM         Review of Systems   Constitutional: Negative for malaise/fatigue. HENT: Negative for congestion. Eyes: Negative for blurred vision. Respiratory: Negative for cough and shortness of breath. Cardiovascular: Negative for chest pain, palpitations and leg swelling. Gastrointestinal: Negative for heartburn, abdominal pain and constipation. Genitourinary: Negative for dysuria, urgency and frequency. Musculoskeletal: Negative for back pain and joint pain. Neurological: Negative for dizziness, tingling and headaches. Endo/Heme/Allergies: Negative for environmental allergies. Psychiatric/Behavioral: Negative for depression. The patient does not have insomnia. Physical Exam   Constitutional: She appears well-developed and well-nourished. /75 (BP 1 Location: Right arm, BP Patient Position: Sitting)  Pulse 72  Temp 96.5 °F (35.8 °C) (Oral)   Resp 16  Ht 5' 1\" (1.549 m)  Wt 165 lb 12.8 oz (75.2 kg)  LMP 04/15/2002  SpO2 97%  BMI 31.33 kg/m2  HENT:   Right Ear: Tympanic membrane and ear canal normal.   Left Ear: Tympanic membrane and ear canal normal.   Nose: No mucosal edema or rhinorrhea. Mouth/Throat: Oropharynx is clear and moist and mucous membranes are normal.   Neck: Normal range of motion. Neck supple. No thyromegaly present. Cardiovascular: Normal rate and regular rhythm. No murmur heard.   Pulmonary/Chest: Effort normal and breath sounds normal. Abdominal: Soft. Bowel sounds are normal. There is no tenderness. Musculoskeletal: Normal range of motion. She exhibits no edema. Lymphadenopathy:     She has no cervical adenopathy. Skin: Skin is warm and dry. Psychiatric: She has a normal mood and affect. Nursing note and vitals reviewed. ASSESSMENT and PLAN  Diagnoses and all orders for this visit:    1. Essential hypertension  Discussed sodium restriction, high k rich diet, maintaining ideal body weight and regular exercise program such as daily walking 30 min perday 4-5 times per week, as physiologic means to achieve blood pressure control.  Medication compliance advised. 2. Type 2 diabetes mellitus without complication, without long-term current use of insulin (HCC)  -     AMB POC HEMOGLOBIN A1C  -     AMB POC GLUCOSE, QUANTITATIVE, BLOOD  -     MICROALBUMIN, UR, RAND W/ MICROALBUMIN/CREA RATIO  -     AMB POC URINALYSIS DIP STICK AUTO W/O MICRO  -     Increase to ITagliptin-metFORMIN (JANUMET XR) 100-1,000 mg TM24; Take 1 Tab by mouth daily. 3. Mixed hyperlipidemia  -     LIPID PANEL    4. Encounter for medication monitoring  -     METABOLIC PANEL, COMPREHENSIVE    5. Encounter for immunization  -     Influenza virus vaccine (FLUZONE HIGH-DOSE) 65 years and older (33074)  -     TN IMMUNIZ ADMIN,1 SINGLE/COMB VAC/TOXOID    6. Non morbid obesity  I have reviewed/discussed the above normal BMI with the patient. I have recommended the following interventions: dietary management education, guidance, and counseling . Follow-up Disposition:  Return in about 3 months (around 2/14/2018).   reviewed diet, exercise and weight control  cardiovascular risk and specific lipid/LDL goals reviewed  reviewed medications and side effects in detail  specific diabetic recommendations: low cholesterol diet, weight control and daily exercise discussed, home glucose monitoring emphasized, all medications, side effects and compliance discussed carefully, foot care discussed and Podiatry visits discussed, annual eye examinations at Ophthalmology discussed and glycohemoglobin and other lab monitoring discussed     I have discussed diagnosis listed in this note with pt and/or family. I have discussed treatment plans and options and the risk/benefit analysis of those options, including safe use of medications and possible medication side effects. Through the use of shared decision making we have agreed to the above plan. The patient has received an after-visit summary and questions were answered concerning future plans and follow up. Advise pt of any urgent changes then to proceed to the ER.

## 2017-11-04 LAB
ALBUMIN SERPL-MCNC: 4.4 G/DL (ref 3.6–4.8)
ALBUMIN/CREAT UR: 4.5 MG/G CREAT (ref 0–30)
ALBUMIN/GLOB SERPL: 1.6 {RATIO} (ref 1.2–2.2)
ALP SERPL-CCNC: 95 IU/L (ref 39–117)
ALT SERPL-CCNC: 14 IU/L (ref 0–32)
AST SERPL-CCNC: 12 IU/L (ref 0–40)
BILIRUB SERPL-MCNC: 0.6 MG/DL (ref 0–1.2)
BUN SERPL-MCNC: 16 MG/DL (ref 8–27)
BUN/CREAT SERPL: 16 (ref 12–28)
CALCIUM SERPL-MCNC: 9.7 MG/DL (ref 8.7–10.3)
CHLORIDE SERPL-SCNC: 99 MMOL/L (ref 96–106)
CHOLEST SERPL-MCNC: 154 MG/DL (ref 100–199)
CO2 SERPL-SCNC: 23 MMOL/L (ref 18–29)
CREAT SERPL-MCNC: 0.97 MG/DL (ref 0.57–1)
CREAT UR-MCNC: 132.4 MG/DL
GFR SERPLBLD CREATININE-BSD FMLA CKD-EPI: 61 ML/MIN/1.73
GFR SERPLBLD CREATININE-BSD FMLA CKD-EPI: 70 ML/MIN/1.73
GLOBULIN SER CALC-MCNC: 2.7 G/DL (ref 1.5–4.5)
GLUCOSE SERPL-MCNC: 146 MG/DL (ref 65–99)
HDLC SERPL-MCNC: 58 MG/DL
INTERPRETATION, 910389: NORMAL
LDLC SERPL CALC-MCNC: 75 MG/DL (ref 0–99)
Lab: NORMAL
MICROALBUMIN UR-MCNC: 5.9 UG/ML
POTASSIUM SERPL-SCNC: 4.6 MMOL/L (ref 3.5–5.2)
PROT SERPL-MCNC: 7.1 G/DL (ref 6–8.5)
SODIUM SERPL-SCNC: 139 MMOL/L (ref 134–144)
TRIGL SERPL-MCNC: 107 MG/DL (ref 0–149)
VLDLC SERPL CALC-MCNC: 21 MG/DL (ref 5–40)

## 2017-11-14 RX ORDER — GLIPIZIDE 5 MG/1
TABLET, FILM COATED, EXTENDED RELEASE ORAL
Qty: 180 TAB | Refills: 3 | Status: SHIPPED | OUTPATIENT
Start: 2017-11-14 | End: 2018-11-08 | Stop reason: SDUPTHER

## 2017-11-15 RX ORDER — LOSARTAN POTASSIUM AND HYDROCHLOROTHIAZIDE 12.5; 5 MG/1; MG/1
TABLET ORAL
Qty: 30 TAB | Refills: 11 | Status: SHIPPED | OUTPATIENT
Start: 2017-11-15 | End: 2018-11-14 | Stop reason: SDUPTHER

## 2017-12-15 DIAGNOSIS — E11.9 TYPE 2 DIABETES MELLITUS WITHOUT COMPLICATION, WITHOUT LONG-TERM CURRENT USE OF INSULIN (HCC): ICD-10-CM

## 2018-06-26 ENCOUNTER — OFFICE VISIT (OUTPATIENT)
Dept: FAMILY MEDICINE CLINIC | Age: 68
End: 2018-06-26

## 2018-06-26 VITALS
HEIGHT: 61 IN | WEIGHT: 155 LBS | BODY MASS INDEX: 29.27 KG/M2 | SYSTOLIC BLOOD PRESSURE: 133 MMHG | TEMPERATURE: 98.2 F | DIASTOLIC BLOOD PRESSURE: 72 MMHG | OXYGEN SATURATION: 97 % | HEART RATE: 82 BPM | RESPIRATION RATE: 18 BRPM

## 2018-06-26 DIAGNOSIS — I10 ESSENTIAL HYPERTENSION: Primary | ICD-10-CM

## 2018-06-26 DIAGNOSIS — E78.2 MIXED HYPERLIPIDEMIA: ICD-10-CM

## 2018-06-26 DIAGNOSIS — Z51.81 ENCOUNTER FOR MEDICATION MONITORING: ICD-10-CM

## 2018-06-26 LAB
BILIRUB UR QL STRIP: NEGATIVE
GLUCOSE POC: 438 MG/DL
GLUCOSE UR-MCNC: NORMAL MG/DL
HBA1C MFR BLD HPLC: >15 %
KETONES P FAST UR STRIP-MCNC: NORMAL MG/DL
PH UR STRIP: 6.5 [PH] (ref 4.6–8)
PROT UR QL STRIP: NORMAL
SP GR UR STRIP: 1.01 (ref 1–1.03)
UA UROBILINOGEN AMB POC: NORMAL (ref 0.2–1)
URINALYSIS CLARITY POC: CLEAR
URINALYSIS COLOR POC: YELLOW
URINE BLOOD POC: NEGATIVE
URINE LEUKOCYTES POC: NEGATIVE
URINE NITRITES POC: NEGATIVE

## 2018-06-26 NOTE — MR AVS SNAPSHOT
[facility] 
 
 
 [address] [facility] [place] [address] [address] [address] 
[phone] Patient: Emma Anton MRN: [id number] :1950 Visit Information Date & Time Provider Department Dept. Phone Encounter #  
 2018  9:00 AM Juan Antonio De La Fuente [place] [phone] [id number] Follow-up Instructions Return in about 1 week (around 7/3/2018). Upcoming Health Maintenance Date Due HEMOGLOBIN A1C Q6M 5/3/2018 Influenza Age 5 to Adult 2018 GLAUCOMA SCREENING Q2Y 2018 LIPID PANEL Q1 11/3/2018 BREAST CANCER SCRN MAMMOGRAM 2019 COLONOSCOPY 11/10/2020 DTaP/Tdap/Td series (2 - Td) 2026 Allergies as of 2018  Review Complete On: 2018 By: Carmenza Ledbetter LPN Severity Noted Reaction Type Reactions Azithromycin  2012    Other (comments) Hallucinations Current Immunizations  Reviewed on 2018 Name Date Influenza High Dose Vaccine PF 11/3/2017, 2016 Influenza Vaccine (Quad) PF 2015 Influenza Vaccine PF 10/11/2013 Influenza Vaccine Split 2012, 2011 Pneumococcal Polysaccharide (PPSV-23) 2014 Tdap 2015 Reviewed by Arleth Lassiter MD on 2018 at  9:32 AM  
You Were Diagnosed With   
  
 Codes Comments Essential hypertension    -  Primary ICD-10-CM: I10 
ICD-9-CM: 401.9 Type 2 diabetes mellitus without complication, without long-term current use of insulin (HCC)     ICD-10-CM: E11.9 ICD-9-CM: 250.00 Mixed hyperlipidemia     ICD-10-CM: E78.2 ICD-9-CM: 272.2 Encounter for medication monitoring     ICD-10-CM: Z51.81 
ICD-9-CM: V58.83 Vitals BP Pulse Temp Resp Height(growth percentile) Weight(growth percentile) 133/72 (BP 1 Location: Left arm, BP Patient Position: Sitting) 82 98.2 °F (36.8 °C) (Oral) 18 5' 1\" (1.549 m) 155 lb (70.3 kg) LMP SpO2 BMI OB Status Smoking Status 04/15/2002 97% 29.29 kg/m2 Postmenopausal Never Smoker BMI and BSA Data Body Mass Index Body Surface Area  
 29.29 kg/m 2 1.74 m 2 Preferred Pharmacy Pharmacy Name Phone [id number] [address] AT [address] [phone] Your Updated Medication List  
  
   
This list is accurate as of 6/26/18 11:02 AM.  Always use your most recent med list.  
  
  
  
  
 atorvastatin 20 mg tablet Commonly known as:  LIPITOR Take 1 Tab by mouth daily. glipiZIDE SR 5 mg CR tablet Commonly known as:  GLUCOTROL XL  
TAKE 2 TABLETS EVERY DAY IN THE MORNING PRIOR TO BREAKFAST  
  
 glucose blood VI test strips strip Commonly known as:  Ascensia CONTOUR  
#100 As directed  
  
 insulin glargine 300 unit/mL (1.5 mL) Inpn Commonly known as:  TOUJEO SOLOSTAR U-300 INSULIN  
20 Units by SubCUTAneous route nightly. losartan-hydroCHLOROthiazide 50-12.5 mg per tablet Commonly known as:  HYZAAR  
TAKE 1 TABLET BY MOUTH EVERY DAY  
  
 pantoprazole 40 mg tablet Commonly known as:  PROTONIX Take 1 Tab by mouth daily. * SITagliptin-metFORMIN 100-1,000 mg Tm24 Commonly known as:  JANUMET XR Take 1 Tab by mouth daily. * SITagliptin-metFORMIN 50-1,000 mg Tm24 Commonly known as:  JANUMET XR Take 2 Tabs by mouth daily (with breakfast). * Notice: This list has 2 medication(s) that are the same as other medications prescribed for you. Read the directions carefully, and ask your doctor or other care provider to review them with you. We Performed the Following AMB POC GLUCOSE, QUANTITATIVE, BLOOD [50633 CPT(R)] AMB POC HEMOGLOBIN A1C [78237 CPT(R)] AMB POC URINALYSIS DIP STICK AUTO W/ MICRO [87541 CPT(R)] CBC W/O DIFF [26963 CPT(R)] HEMOGLOBIN A1C W/O EAG [35872 CPT(R)] LIPID PANEL [47497 CPT(R)] METABOLIC PANEL, COMPREHENSIVE [62836 CPT(R)] MICROALBUMIN, UR, RAND W/ MICROALB/CREAT RATIO [id number] CPT(R)] Follow-up Instructions Return in about 1 week (around 7/3/2018). Introducing [facility] SERVICES! [facility] introduces [employer] patient portal. Now you can access parts of your medical record, email your doctor's office, and request medication refills online. 1. In your internet browser, go to [email or web address] 2. Click on the First Time User? Click Here link in the Sign In box. You will see the New Member Sign Up page. 3. Enter your [employer] Access Code exactly as it appears below. You will not need to use this code after youve completed the sign-up process. If you do not sign up before the expiration date, you must request a new code. · [employer] Access Code: [email or web address] Expires: 9/24/2018 10:13 AM 
 
4. Enter the last four digits of your Social Security Number (xxxx) and Date of Birth (mm/dd/yyyy) as indicated and click Submit. You will be taken to the next sign-up page. 5. Create a [employer] ID. This will be your [employer] login ID and cannot be changed, so think of one that is secure and easy to remember. 6. Create a [employer] password. You can change your password at any time. 7. Enter your Password Reset Question and Answer. This can be used at a later time if you forget your password. 8. Enter your e-mail address. You will receive e-mail notification when new information is available in [address] 9. Click Sign Up. You can now view and download portions of your medical record. 10. Click the Download Summary menu link to download a portable copy of your medical information. If you have questions, please visit the Frequently Asked Questions section of the [employer] website. Remember, [employer] is NOT to be used for urgent needs. For medical emergencies, dial 911. Now available from your iPhone and Android! Please provide this summary of care documentation to your next provider. Your primary care clinician is listed as Roro Saenz. If you have any questions after today's visit, please call [phone].

## 2018-06-26 NOTE — PROGRESS NOTES
HISTORY OF PRESENT ILLNESS  Syd El is a 75 y.o. female. HPI   Follow up on chronic medical problems. Overall feeling well. Missed several appts earlier in the year d/t work. HTN follow up:  Compliant w/ meds, low salt diet, and exercise. No home bp monitoring. No swelling, headache or dizziness. No chest pain, SOB, palpitations. DM type II follow up:  Compliant w/ meds, diabetic diet, and exercise. Has not been checking her BS over the past several months. Denies any tingling sensation. Has had polyuria and polydipsia. No blurred vision. No significant weight changes. Hypercholesterolemia follow up:  Compliant w/ low fat, low cholesterol diet. Has been taking the Lipitor nightly. Exercising some. No muscle nor no skin discoloration. Patient fasting today. Patient Active Problem List   Diagnosis Code    Environmental allergies Z91.09    Hypovitaminosis D E55.9    Diabetes mellitus ([facility] E11.9    Sarcoidosis D86.9    Essential hypertension I10    Hyperlipidemia E78.5    Encounter for medication monitoring Z51.81       Current Outpatient Prescriptions   Medication Sig Dispense Refill    SITagliptin-metFORMIN (JANUMET XR) 50-1,000 mg TM24 Take 2 Tabs by mouth daily (with breakfast). 30 Tab 3    insulin glargine (TOUJEO SOLOSTAR U-300 INSULIN) 300 unit/mL (1.5 mL) inpn 20 Units by SubCUTAneous route nightly. 3 mL 6    SITagliptin-metFORMIN (JANUMET XR) 100-1,000 mg TM24 Take 1 Tab by mouth daily. 30 Tab 6    glipiZIDE SR (GLUCOTROL XL) 5 mg CR tablet TAKE 2 TABLETS EVERY DAY IN THE MORNING PRIOR TO BREAKFAST 180 Tab 3    atorvastatin (LIPITOR) 20 mg tablet Take 1 Tab by mouth daily. 30 Tab 11    pantoprazole (PROTONIX) 40 mg tablet Take 1 Tab by mouth daily.  30 Tab 11    glucose blood VI test strips (ASCENSIA CONTOUR) strip #100  As directed 100 Strip 11    losartan-hydroCHLOROthiazide (HYZAAR) 50-12.5 mg per tablet TAKE 1 TABLET BY MOUTH EVERY DAY 30 Tab 11 Allergies   Allergen Reactions    Azithromycin Other (comments)     Hallucinations       Past Medical History:   Diagnosis Date    Diabetes ([address] 2012    HTN (hypertension) 3/16/2010    Hypercholesterolemia     Sarcoidosis 1980s       Past Surgical History:   Procedure Laterality Date    ENDOSCOPY, COLON, DIAGNOSTIC  7/16/2007    repeat in 2010    HX ORTHOPAEDIC  09/2009    repair tear left knee    [place] COLONOSCOPY W/BIOPSY SINGLE/MULTIPLE  07/2010    repear in 5yr /dr. Noam Ring.        Family History   Problem Relation Age of Onset   [facility] Cancer Mother      lung    Stroke Mother     Diabetes Father     Hypertension Father     Hypertension Sister     Diabetes Sister     Diabetes Maternal Grandmother        Social History   Substance Use Topics    Smoking status: Never Smoker    Smokeless tobacco: Never Used    Alcohol use No        Lab Results  Component Value Date/Time   WBC 4.4 03/06/2017 08:39 AM   HGB 11.6 03/06/2017 08:39 AM   HCT 35.3 03/06/2017 08:39 AM   PLATELET 069 90/29/2509 08:39 AM   MCV 87 03/06/2017 08:39 AM     Lab Results  Component Value Date/Time   Cholesterol, total 154 11/03/2017 09:37 AM   HDL Cholesterol 58 11/03/2017 09:37 AM   LDL, calculated 75 11/03/2017 09:37 AM   Triglyceride 107 11/03/2017 09:37 AM   CHOL/HDL Ratio 3.8 04/02/2010 01:39 PM     Lab Results   Component Value Date/Time    Glucose 146 (H) 11/03/2017 09:37 AM    Glucose (POC) 168 (H) 11/10/2015 11:27 AM    Glucose  06/26/2018 09:40 AM      Lab Results   Component Value Date/Time    Sodium 139 11/03/2017 09:37 AM    Potassium 4.6 11/03/2017 09:37 AM    Chloride 99 11/03/2017 09:37 AM    CO2 23 11/03/2017 09:37 AM    Anion gap 8 04/02/2010 01:39 PM    Glucose 146 (H) 11/03/2017 09:37 AM    BUN 16 11/03/2017 09:37 AM    Creatinine 0.97 11/03/2017 09:37 AM    BUN/Creatinine ratio 16 11/03/2017 09:37 AM    GFR est AA 70 11/03/2017 09:37 AM    GFR est non-AA 61 11/03/2017 09:37 AM    Calcium 9.7 11/03/2017 09:37 AM    Bilirubin, total 0.6 11/03/2017 09:37 AM    ALT (SGPT) 14 11/03/2017 09:37 AM    AST (SGOT) 12 11/03/2017 09:37 AM    Alk. phosphatase 95 11/03/2017 09:37 AM    Protein, total 7.1 11/03/2017 09:37 AM    Albumin 4.4 11/03/2017 09:37 AM    Globulin 3.6 04/02/2010 01:39 PM    A-G Ratio 1.6 11/03/2017 09:37 AM      Lab Results   Component Value Date/Time    Hemoglobin A1c 7.7 (H) 02/14/2014 12:30 PM    Hemoglobin A1c (POC) >15 06/26/2018 09:40 AM         Review of Systems   Constitutional: Negative for malaise/fatigue. HENT: Negative for congestion. Eyes: Negative for blurred vision. Respiratory: Negative for cough and shortness of breath. Cardiovascular: Negative for chest pain, palpitations and leg swelling. Gastrointestinal: Negative for abdominal pain, constipation and heartburn. Genitourinary: Negative for dysuria, frequency and urgency. Musculoskeletal: Negative for back pain and joint pain. Neurological: Negative for dizziness, tingling and headaches. Endo/Heme/Allergies: Negative for environmental allergies. Psychiatric/Behavioral: Negative for depression. The patient does not have insomnia. Physical Exam   Constitutional: She appears well-developed and well-nourished. /72 (BP 1 Location: Left arm, BP Patient Position: Sitting)  Pulse 82  Temp 98.2 °F (36.8 °C) (Oral)   Resp 18  Ht 5' 1\" (1.549 m)  Wt 155 lb (70.3 kg)  LMP 04/15/2002  SpO2 97%  BMI 29.29 kg/m2   HENT:   Right Ear: Tympanic membrane and ear canal normal.   Left Ear: Tympanic membrane and ear canal normal.   Nose: No mucosal edema or rhinorrhea. Mouth/Throat: Oropharynx is clear and moist and mucous membranes are normal.   Neck: Normal range of motion. Neck supple. No thyromegaly present. Cardiovascular: Normal rate and regular rhythm. No murmur heard. Pulmonary/Chest: Effort normal and breath sounds normal.   Abdominal: Soft. Bowel sounds are normal. There is no tenderness. Musculoskeletal: Normal range of motion. She exhibits no edema. Lymphadenopathy:     She has no cervical adenopathy. Skin: Skin is warm and dry. Psychiatric: She has a normal mood and affect. Nursing note and vitals reviewed. ASSESSMENT and PLAN  Diagnoses and all orders for this visit:    1. Essential hypertension  Stable at goal      2. Uncontrolled type 2 diabetes mellitus without complication, with long-term current use of insulin (HCC)  -     MICROALBUMIN, UR, RAND W/ MICROALB/CREAT RATIO  -     AMB POC URINALYSIS DIP STICK AUTO W/ MICRO  -     AMB POC HEMOGLOBIN A1C  -     AMB POC GLUCOSE, QUANTITATIVE, BLOOD  -     Increase SITagliptin-metFORMIN (JANUMET XR) 50-1,000 mg TM24; Take 2 Tabs by mouth daily (with breakfast). -     Start insulin glargine (TOUJEO SOLOSTAR U-300 INSULIN) 300 unit/mL (1.5 mL) inpn; 20 Units by SubCUTAneous route nightly. Reviewed use of insulin.   -     HEMOGLOBIN A1C W/O EAG    3. Mixed hyperlipidemia  -     LIPID PANEL    4. Encounter for medication monitoring  -     METABOLIC PANEL, COMPREHENSIVE  -     CBC W/O DIFF      Follow-up Disposition:  Return in about 1 week (around 7/3/2018). reviewed diet, exercise and weight control  cardiovascular risk and specific lipid/LDL goals reviewed  reviewed medications and side effects in detail  specific diabetic recommendations: low cholesterol diet, weight control and daily exercise discussed, home glucose monitoring emphasized, all medications, side effects and compliance discussed carefully, use and side effects of insulin is taught, foot care discussed and Podiatry visits discussed, annual eye examinations at Ophthalmology discussed, glycohemoglobin and other lab monitoring discussed and long term diabetic complications discussed     I have discussed diagnosis listed in this note with pt and/or family.  I have discussed treatment plans and options and the risk/benefit analysis of those options, including safe use of medications and possible medication side effects. Through the use of shared decision making we have agreed to the above plan. The patient has received an after-visit summary and questions were answered concerning future plans and follow up. Advise pt of any urgent changes then to proceed to the ER.

## 2018-06-26 NOTE — PROGRESS NOTES
Chief Complaint   Patient presents with    Cholesterol Problem     follow up    Hypertension     follow up    Diabetes     follow up       Mammogram 09/05/2017     Colonoscopy 11/10/2015  repeat in 5 years    Eye exam 12/05/2017    1. Have you been to the ER, urgent care clinic since your last visit? Hospitalized since your last visit? No    2. Have you seen or consulted any other health care providers outside of the [address] [place] [address] since your last visit? Include any pap smears or colon screening. No     Patient denies pain at this time.

## 2018-06-27 LAB
ALBUMIN SERPL-MCNC: 4.3 G/DL (ref 3.6–4.8)
ALBUMIN/CREAT UR: 48.8 MG/G CREAT (ref 0–30)
ALBUMIN/GLOB SERPL: 1.5 {RATIO} (ref 1.2–2.2)
ALP SERPL-CCNC: 110 IU/L (ref 39–117)
ALT SERPL-CCNC: 17 IU/L (ref 0–32)
AST SERPL-CCNC: 15 IU/L (ref 0–40)
BILIRUB SERPL-MCNC: 0.6 MG/DL (ref 0–1.2)
BUN SERPL-MCNC: 15 MG/DL (ref 8–27)
BUN/CREAT SERPL: 14 (ref 12–28)
CALCIUM SERPL-MCNC: 9.7 MG/DL (ref 8.7–10.3)
CHLORIDE SERPL-SCNC: 99 MMOL/L (ref 96–106)
CHOLEST SERPL-MCNC: 192 MG/DL (ref 100–199)
CO2 SERPL-SCNC: 24 MMOL/L (ref 20–29)
CREAT SERPL-MCNC: 1.09 MG/DL (ref 0.57–1)
CREAT UR-MCNC: 58.8 MG/DL
ERYTHROCYTE [DISTWIDTH] IN BLOOD BY AUTOMATED COUNT: 13.3 % (ref 12.3–15.4)
GLOBULIN SER CALC-MCNC: 2.8 G/DL (ref 1.5–4.5)
GLUCOSE SERPL-MCNC: 418 MG/DL (ref 65–99)
HBA1C MFR BLD: 14.8 % (ref 4.8–5.6)
HCT VFR BLD AUTO: 39 % (ref 34–46.6)
HDLC SERPL-MCNC: 55 MG/DL
HGB BLD-MCNC: 12.5 G/DL (ref 11.1–15.9)
INTERPRETATION, 910389: NORMAL
INTERPRETATION: NORMAL
LDLC SERPL CALC-MCNC: 105 MG/DL (ref 0–99)
Lab: NORMAL
Lab: NORMAL
MCH RBC QN AUTO: 28.9 PG (ref 26.6–33)
MCHC RBC AUTO-ENTMCNC: 32.1 G/DL (ref 31.5–35.7)
MCV RBC AUTO: 90 FL (ref 79–97)
MICROALBUMIN UR-MCNC: 28.7 UG/ML
PDF IMAGE, 910387: NORMAL
PLATELET # BLD AUTO: 255 X10E3/UL (ref 150–379)
POTASSIUM SERPL-SCNC: 5.1 MMOL/L (ref 3.5–5.2)
PROT SERPL-MCNC: 7.1 G/DL (ref 6–8.5)
RBC # BLD AUTO: 4.33 X10E6/UL (ref 3.77–5.28)
SODIUM SERPL-SCNC: 139 MMOL/L (ref 134–144)
TRIGL SERPL-MCNC: 162 MG/DL (ref 0–149)
VLDLC SERPL CALC-MCNC: 32 MG/DL (ref 5–40)
WBC # BLD AUTO: 4.8 X10E3/UL (ref 3.4–10.8)

## 2018-06-27 RX ORDER — PEN NEEDLE, DIABETIC 30 GX3/16"
NEEDLE, DISPOSABLE MISCELLANEOUS
Qty: 100 PEN NEEDLE | Refills: 11 | Status: SHIPPED | OUTPATIENT
Start: 2018-06-27 | End: 2020-02-04

## 2018-06-27 NOTE — PROGRESS NOTES
2 sample pens of Toujeo solostar provided to patient - started on Toujeo yesterday. Samples logged, documented and signed out per policy.     Molly Lester, PHARMD, CDE

## 2018-07-02 ENCOUNTER — TELEPHONE (OUTPATIENT)
Dept: FAMILY MEDICINE CLINIC | Age: 68
End: 2018-07-02

## 2018-07-02 NOTE — TELEPHONE ENCOUNTER
Spoke with patient and states her blood sugars are coming down.  Patient has an appt tomorrow with Dr. Tillman

## 2018-07-02 NOTE — TELEPHONE ENCOUNTER
----- Message from Elena Saucedo MD sent at 6/26/2018  6:48 PM EDT -----  Please call her and see how her BS are running.

## 2018-07-03 ENCOUNTER — OFFICE VISIT (OUTPATIENT)
Dept: FAMILY MEDICINE CLINIC | Age: 68
End: 2018-07-03

## 2018-07-03 VITALS
BODY MASS INDEX: 29.15 KG/M2 | HEIGHT: 61 IN | OXYGEN SATURATION: 99 % | RESPIRATION RATE: 16 BRPM | DIASTOLIC BLOOD PRESSURE: 60 MMHG | SYSTOLIC BLOOD PRESSURE: 115 MMHG | WEIGHT: 154.4 LBS | HEART RATE: 80 BPM | TEMPERATURE: 96.4 F

## 2018-07-03 DIAGNOSIS — Z23 ENCOUNTER FOR IMMUNIZATION: ICD-10-CM

## 2018-07-03 PROBLEM — E11.21 TYPE 2 DIABETES WITH NEPHROPATHY (HCC): Status: ACTIVE | Noted: 2018-07-03

## 2018-07-03 LAB — GLUCOSE POC: 156 MG/DL

## 2018-07-03 NOTE — PROGRESS NOTES
Chief Complaint   Patient presents with    Diabetes     follow up    Medication Evaluation     Toujeo     Patient states blood sugars are coming down.

## 2018-07-03 NOTE — PROGRESS NOTES
HISTORY OF PRESENT ILLNESS  Huong Schofield is a 75 y.o. female. HPI   Follow up on diabetes. Tolerating toujeo. BS are coming down and she is feeling better. Much less frequent urination. Diabetes Mellitus:  She has diabetes mellitus, and  hyperlipidemia. Diabetic ROS - medication compliance: compliant all of the time, diabetic diet compliance: compliant most of the time, home glucose monitoring: is performed regularly, fasting values range mid 100s, nonfasting values range has not been done, further diabetic ROS: no polyuria or polydipsia, no chest pain, dyspnea or TIA's, no numbness, tingling or pain in extremities, no unusual visual symptoms, no hypoglycemia, no medication side effects noted. Lab review: orders written for new lab studies as appropriate; see orders. Patient Active Problem List   Diagnosis Code    Environmental allergies Z91.09    Hypovitaminosis D E55.9    Diabetes mellitus ([facility] [address] E11.9    Sarcoidosis D86.9    Essential hypertension I10    Hyperlipidemia E78.5    Encounter for medication monitoring Z51.81       Current Outpatient Prescriptions   Medication Sig Dispense Refill    Insulin Needles, Disposable, (BD ULTRA-FINE SHORT PEN NEEDLE) 31 gauge x 5/16\" ndle Use to inject insulin daily 100 Pen Needle 11    insulin glargine (TOUJEO SOLOSTAR U-300 INSULIN) 300 unit/mL (1.5 mL) inpn 20 Units by SubCUTAneous route nightly. 2 Pen 0    SITagliptin-metFORMIN (JANUMET XR) 50-1,000 mg TM24 Take 2 Tabs by mouth daily (with breakfast). 30 Tab 3    SITagliptin-metFORMIN (JANUMET XR) 100-1,000 mg TM24 Take 1 Tab by mouth daily. 30 Tab 6    losartan-hydroCHLOROthiazide (HYZAAR) 50-12.5 mg per tablet TAKE 1 TABLET BY MOUTH EVERY DAY 30 Tab 11    glipiZIDE SR (GLUCOTROL XL) 5 mg CR tablet TAKE 2 TABLETS EVERY DAY IN THE MORNING PRIOR TO BREAKFAST 180 Tab 3    atorvastatin (LIPITOR) 20 mg tablet Take 1 Tab by mouth daily.  30 Tab 11    pantoprazole (PROTONIX) 40 mg tablet Take 1 Tab by mouth daily. 30 Tab 11    glucose blood VI test strips (ASCENSIA CONTOUR) strip #100  As directed 100 Strip 11       Allergies   Allergen Reactions    Azithromycin Other (comments)     Hallucinations       Past Medical History:   Diagnosis Date    Diabetes ([address] 2012    HTN (hypertension) 3/16/2010    Hypercholesterolemia     Sarcoidosis 1980s       Past Surgical History:   Procedure Laterality Date    ENDOSCOPY, COLON, DIAGNOSTIC  7/16/2007    repeat in 2010    HX ORTHOPAEDIC  09/2009    repair tear left knee    [place] COLONOSCOPY W/BIOPSY SINGLE/MULTIPLE  07/2010    repear in 5yr /dr. Rickey Rodas.        Family History   Problem Relation Age of Onset   [facility] Cancer Mother      lung    Stroke Mother     Diabetes Father     Hypertension Father     Hypertension Sister     Diabetes Sister     Diabetes Maternal Grandmother        Social History   Substance Use Topics    Smoking status: Never Smoker    Smokeless tobacco: Never Used    Alcohol use No        Lab Results  Component Value Date/Time   WBC 4.8 06/26/2018 10:08 AM   HGB 12.5 06/26/2018 10:08 AM   HCT 39.0 06/26/2018 10:08 AM   PLATELET 046 12/58/9937 10:08 AM   MCV 90 06/26/2018 10:08 AM     Lab Results  Component Value Date/Time   Cholesterol, total 192 06/26/2018 10:08 AM   HDL Cholesterol 55 06/26/2018 10:08 AM   LDL, calculated 105 (H) 06/26/2018 10:08 AM   Triglyceride 162 (H) 06/26/2018 10:08 AM   CHOL/HDL Ratio 3.8 04/02/2010 01:39 PM     Lab Results   Component Value Date/Time    Sodium 139 06/26/2018 10:08 AM    Potassium 5.1 06/26/2018 10:08 AM    Chloride 99 06/26/2018 10:08 AM    CO2 24 06/26/2018 10:08 AM    Anion gap 8 04/02/2010 01:39 PM    Glucose 418 (H) 06/26/2018 10:08 AM    BUN 15 06/26/2018 10:08 AM    Creatinine 1.09 (H) 06/26/2018 10:08 AM    BUN/Creatinine ratio 14 06/26/2018 10:08 AM    GFR est AA 60 06/26/2018 10:08 AM    GFR est non-AA 52 (L) 06/26/2018 10:08 AM    Calcium 9.7 06/26/2018 10:08 AM    Bilirubin, total 0.6 06/26/2018 10:08 AM    ALT (SGPT) 17 06/26/2018 10:08 AM    AST (SGOT) 15 06/26/2018 10:08 AM    Alk. phosphatase 110 06/26/2018 10:08 AM    Protein, total 7.1 06/26/2018 10:08 AM    Albumin 4.3 06/26/2018 10:08 AM    Globulin 3.6 04/02/2010 01:39 PM    A-G Ratio 1.5 06/26/2018 10:08 AM      Lab Results   Component Value Date/Time    Hemoglobin A1c 14.8 (H) 06/26/2018 10:08 AM    Hemoglobin A1c (POC) >15 06/26/2018 09:40 AM         Review of Systems   Constitutional: Negative for malaise/fatigue. HENT: Negative for congestion. Eyes: Negative for blurred vision. Respiratory: Negative for cough and shortness of breath. Cardiovascular: Negative for chest pain, palpitations and leg swelling. Gastrointestinal: Negative for abdominal pain, constipation and heartburn. Genitourinary: Negative for dysuria, frequency and urgency. Musculoskeletal: Negative for back pain and joint pain. Neurological: Negative for dizziness, tingling and headaches. Endo/Heme/Allergies: Negative for environmental allergies. Psychiatric/Behavioral: Negative for depression. The patient does not have insomnia. Physical Exam   Constitutional: She appears well-developed and well-nourished. /60 (BP 1 Location: Left arm, BP Patient Position: Sitting)  Pulse 80  Temp 96.4 °F (35.8 °C) (Oral)   Resp 16  Ht 5' 1\" (1.549 m)  Wt 154 lb 6.4 oz (70 kg)  LMP 04/15/2002  SpO2 99%  BMI 29.17 kg/m2   HENT:   Right Ear: Tympanic membrane and ear canal normal.   Left Ear: Tympanic membrane and ear canal normal.   Nose: No mucosal edema or rhinorrhea. Mouth/Throat: Oropharynx is clear and moist and mucous membranes are normal.   Neck: Normal range of motion. Neck supple. No thyromegaly present. Cardiovascular: Normal rate and regular rhythm. No murmur heard. Pulmonary/Chest: Effort normal and breath sounds normal.   Abdominal: Soft. Bowel sounds are normal. There is no tenderness.    Musculoskeletal: Normal range of motion. She exhibits no edema. Lymphadenopathy:     She has no cervical adenopathy. Skin: Skin is warm and dry. Psychiatric: She has a normal mood and affect. Nursing note and vitals reviewed. ASSESSMENT and PLAN  Diagnoses and all orders for this visit:    1. Uncontrolled type 2 diabetes mellitus without complication, with long-term current use of insulin (HCC)  -     AMB POC GLUCOSE, QUANTITATIVE, BLOOD  -     Increase insulin glargine (TOUJEO SOLOSTAR U-300 INSULIN) 300 unit/mL (1.5 mL) inpn; 24 Units by SubCUTAneous route nightly. Call if FBS drop to less than 100.      2. Encounter for immunization  -     Pneumococcal conjugate 13 valent, IM (PREVNAR-13)      Follow-up Disposition:  Return in about 3 months (around 9/24/2018). reviewed diet, exercise and weight control  cardiovascular risk and specific lipid/LDL goals reviewed  reviewed medications and side effects in detail  specific diabetic recommendations: low cholesterol diet, weight control and daily exercise discussed, home glucose monitoring emphasized, all medications, side effects and compliance discussed carefully, foot care discussed and Podiatry visits discussed, annual eye examinations at Ophthalmology discussed and glycohemoglobin and other lab monitoring discussed     I have discussed diagnosis listed in this note with pt and/or family. I have discussed treatment plans and options and the risk/benefit analysis of those options, including safe use of medications and possible medication side effects. Through the use of shared decision making we have agreed to the above plan. The patient has received an after-visit summary and questions were answered concerning future plans and follow up. Advise pt of any urgent changes then to proceed to the ER.

## 2018-07-03 NOTE — PROGRESS NOTES
After obtaining consent, and per verbal orders of Dr. Leonardo Sherwood, injection of Prenar 13 given in left deltoid  by Ayanna Castaneda LPN. Patient instructed to remain in clinic for 20 minutes afterwards, and to report any adverse reaction to me immediately. Patient did not have any adverse reactions during this office visit.

## 2018-07-03 NOTE — MR AVS SNAPSHOT
[facility] 
 
 
 [address] [facility] [address] [id number] [phone] Patient: Jonathan Torres MRN: [id number] :1950 Visit Information Date & Time Provider Department Dept. Phone Encounter #  
 7/3/2018  8:00 AM Javad Escalante MD [facility] [phone] [id number] Follow-up Instructions Return in about 3 months (around 2018). Upcoming Health Maintenance Date Due Influenza Age 5 to Adult 2018 GLAUCOMA SCREENING Q2Y 2018 HEMOGLOBIN A1C Q6M 2018 LIPID PANEL Q1 2019 BREAST CANCER SCRN MAMMOGRAM 2019 COLONOSCOPY 11/10/2020 DTaP/Tdap/Td series (2 - Td) 2026 Allergies as of 7/3/2018  Review Complete On: 7/3/2018 By: Javad Escalante MD  
  
 Severity Noted Reaction Type Reactions Azithromycin  2012    Other (comments) Hallucinations Current Immunizations  Reviewed on 7/3/2018 Name Date Influenza High Dose Vaccine PF 11/3/2017, 2016 Influenza Vaccine (Quad) PF 2015 Influenza Vaccine PF 10/11/2013 Influenza Vaccine Split 2012, 2011 Pneumococcal Conjugate (PCV-13)  Incomplete Pneumococcal Polysaccharide (PPSV-23) 2014 Tdap 2015 Reviewed by Javad Escalante MD on 7/3/2018 at  7:59 AM  
You Were Diagnosed With   
  
 Codes Comments Uncontrolled type 2 diabetes mellitus without complication, with long-term current use of insulin [facility]    -  Primary ICD-10-CM: E11.65, Z79.4 ICD-9-CM: 250.02, V58.67 Encounter for immunization     ICD-10-CM: Z82 ICD-9-CM: V03.89 Vitals BP Pulse Temp Resp Height(growth percentile) Weight(growth percentile)  
 115/60 (BP 1 Location: Left arm, BP Patient Position: Sitting) 80 96.4 °F (35.8 °C) (Oral) 16 5' 1\" (1.549 m) 154 lb 6.4 oz (70 kg) LMP SpO2 BMI OB Status Smoking Status 04/15/2002 99% 29.17 kg/m2 Postmenopausal Never Smoker Vitals History BMI and BSA Data Body Mass Index Body Surface Area  
 29.17 kg/m 2 1.74 m 2 Preferred Pharmacy Pharmacy Name Phone [id number] [facility] [place] [phone] Your Updated Medication List  
  
   
This list is accurate as of 7/3/18  8:21 AM.  Always use your most recent med list.  
  
  
  
  
 atorvastatin 20 mg tablet Commonly known as:  LIPITOR Take 1 Tab by mouth daily. glipiZIDE SR 5 mg CR tablet Commonly known as:  GLUCOTROL XL  
TAKE 2 TABLETS EVERY DAY IN THE MORNING PRIOR TO BREAKFAST  
  
 glucose blood VI test strips strip Commonly known as:  Ascensia CONTOUR  
#100 As directed  
  
 insulin glargine 300 unit/mL (1.5 mL) Inpn Commonly known as:  TOUJEO SOLOSTAR U-300 INSULIN  
24 Units by SubCUTAneous route nightly. Insulin Needles (Disposable) 31 gauge x 5/16\" Ndle Commonly known as:  BD ULTRA-FINE SHORT PEN NEEDLE Use to inject insulin daily  
  
 losartan-hydroCHLOROthiazide 50-12.5 mg per tablet Commonly known as:  HYZAAR  
TAKE 1 TABLET BY MOUTH EVERY DAY  
  
 pantoprazole 40 mg tablet Commonly known as:  PROTONIX Take 1 Tab by mouth daily. SITagliptin-metFORMIN 50-1,000 mg Tm24 Commonly known as:  JANUMET XR Take 2 Tabs by mouth daily (with breakfast). We Performed the Following AMB POC GLUCOSE, QUANTITATIVE, BLOOD [90870 CPT(R)] PNEUMOCOCCAL CONJ VACCINE 13 VALENT IM [id number] CPT(R)] Follow-up Instructions Return in about 3 months (around 9/24/2018). Introducing [facility]! [facility] introduces [employer] patient portal. Now you can access parts of your medical record, email your doctor's office, and request medication refills online. 1. In your internet browser, go to [email or web address] 2. Click on the First Time User? Click Here link in the Sign In box. You will see the New Member Sign Up page. 3. Enter your [employer] Access Code exactly as it appears below. You will not need to use this code after youve completed the sign-up process. If you do not sign up before the expiration date, you must request a new code. · [employer] Access Code: [email or web address] Expires: 9/24/2018 10:13 AM 
 
4. Enter the last four digits of your Social Security Number (xxxx) and Date of Birth (mm/dd/yyyy) as indicated and click Submit. You will be taken to the next sign-up page. 5. Create a [employer] ID. This will be your [employer] login ID and cannot be changed, so think of one that is secure and easy to remember. 6. Create a [employer] password. You can change your password at any time. 7. Enter your Password Reset Question and Answer. This can be used at a later time if you forget your password. 8. Enter your e-mail address. You will receive e-mail notification when new information is available in [address] 9. Click Sign Up. You can now view and download portions of your medical record. 10. Click the Download Summary menu link to download a portable copy of your medical information. If you have questions, please visit the Frequently Asked Questions section of the [employer] website. Remember, [employer] is NOT to be used for urgent needs. For medical emergencies, dial 911. Now available from your iPhone and Android! Please provide this summary of care documentation to your next provider. Your primary care clinician is listed as Lynette Li. If you have any questions after today's visit, please call [phone].

## 2018-07-10 DIAGNOSIS — K21.9 GASTROESOPHAGEAL REFLUX DISEASE WITHOUT ESOPHAGITIS: ICD-10-CM

## 2018-07-10 RX ORDER — PANTOPRAZOLE SODIUM 40 MG/1
40 TABLET, DELAYED RELEASE ORAL DAILY
Qty: 30 TAB | Refills: 0 | Status: SHIPPED | OUTPATIENT
Start: 2018-07-10 | End: 2018-09-19 | Stop reason: SDUPTHER

## 2018-07-10 NOTE — TELEPHONE ENCOUNTER
Last Visit: 7/3/18-Hannacroix  Next Appt: 9/24/18-Hannacroix  Previous Refill Encounter: 7/18/17-30+11    Requested Prescriptions     Pending Prescriptions Disp Refills    pantoprazole (PROTONIX) 40 mg tablet 30 Tab 11     Sig: Take 1 Tab by mouth daily.

## 2018-07-17 NOTE — TELEPHONE ENCOUNTER
[facility] Requests A 90 day supply on the patients behalf. Last Visit: 6/7/0432-TFCVVJ-MGWQIX  Next Appt: 9/24/1858-Fkhaqc-Cgdyob  Previous Refill Encounter: 6/26/18-30+3    Requested Prescriptions     Pending Prescriptions Disp Refills    SITagliptin-metFORMIN (JANUMET XR) 50-1,000 mg TM24 180 Tab 3     Sig: Take 2 Tabs by mouth daily (with breakfast).

## 2018-09-11 RX ORDER — ATORVASTATIN CALCIUM 20 MG/1
20 TABLET, FILM COATED ORAL DAILY
Qty: 90 TAB | Refills: 3 | Status: SHIPPED | OUTPATIENT
Start: 2018-09-11 | End: 2019-09-07 | Stop reason: SDUPTHER

## 2018-09-11 NOTE — TELEPHONE ENCOUNTER
Last Visit: 7/5-Anfuks-Tferdm  Next Appt: 9/2460-Runmde-Vwamxc  Previous Refill Encounter: 9/15/17-30+11    Requested Prescriptions     Pending Prescriptions Disp Refills    atorvastatin (LIPITOR) 20 mg tablet 90 Tab 3     Sig: Take 1 Tab by mouth daily.

## 2018-09-19 DIAGNOSIS — K21.9 GASTROESOPHAGEAL REFLUX DISEASE WITHOUT ESOPHAGITIS: ICD-10-CM

## 2018-09-19 RX ORDER — PANTOPRAZOLE SODIUM 40 MG/1
40 TABLET, DELAYED RELEASE ORAL DAILY
Qty: 90 TAB | Refills: 3 | Status: SHIPPED | OUTPATIENT
Start: 2018-09-19 | End: 2019-10-14 | Stop reason: SDUPTHER

## 2018-09-19 NOTE — TELEPHONE ENCOUNTER
Last Visit: 29-Glkjrf-Jjobed  Next Appt: 9/2493-Aujrui-Uaplwg  Previous Refill Encounter: 7/10-30+0    Requested Prescriptions     Pending Prescriptions Disp Refills    pantoprazole (PROTONIX) 40 mg tablet 90 Tab 3     Sig: Take 1 Tab by mouth daily.

## 2018-09-24 ENCOUNTER — OFFICE VISIT (OUTPATIENT)
Dept: FAMILY MEDICINE CLINIC | Age: 68
End: 2018-09-24

## 2018-09-24 VITALS
DIASTOLIC BLOOD PRESSURE: 78 MMHG | SYSTOLIC BLOOD PRESSURE: 134 MMHG | HEART RATE: 79 BPM | WEIGHT: 159.4 LBS | HEIGHT: 61 IN | RESPIRATION RATE: 16 BRPM | TEMPERATURE: 97.4 F | OXYGEN SATURATION: 97 % | BODY MASS INDEX: 30.09 KG/M2

## 2018-09-24 DIAGNOSIS — I10 ESSENTIAL HYPERTENSION: Primary | ICD-10-CM

## 2018-09-24 DIAGNOSIS — Z23 ENCOUNTER FOR IMMUNIZATION: ICD-10-CM

## 2018-09-24 DIAGNOSIS — E11.21 TYPE 2 DIABETES WITH NEPHROPATHY (HCC): ICD-10-CM

## 2018-09-24 DIAGNOSIS — E78.2 MIXED HYPERLIPIDEMIA: ICD-10-CM

## 2018-09-24 DIAGNOSIS — Z51.81 ENCOUNTER FOR MEDICATION MONITORING: ICD-10-CM

## 2018-09-24 LAB
GLUCOSE POC: 111 MG/DL
HBA1C MFR BLD HPLC: 7.3 %

## 2018-09-24 NOTE — MR AVS SNAPSHOT
[address] [facility] 
 
 
 [address] [facility] [id number] 
[phone] Patient: Veda Lopes MRN: [id number] :1950 Visit Information Date & Time Provider Department Dept. Phone Encounter #  
 2018  8:30 AM Juan Antonio Torres [phone] [id number] Follow-up Instructions Return in about 3 months (around 2019). Upcoming Health Maintenance Date Due Influenza Age 5 to Adult 2018 HEMOGLOBIN A1C Q6M 2018 LIPID PANEL Q1 2019 BREAST CANCER SCRN MAMMOGRAM 2019 GLAUCOMA SCREENING Q2Y 2019 COLONOSCOPY 11/10/2020 DTaP/Tdap/Td series (2 - Td) 2026 Allergies as of 2018  Review Complete On: 2018 By: Vinicius Reis LPN Severity Noted Reaction Type Reactions Azithromycin  2012    Other (comments) Hallucinations Current Immunizations  Reviewed on 2018 Name Date Influenza High Dose Vaccine PF 11/3/2017, 2016 Influenza Vaccine (Quad) PF 2015 Influenza Vaccine (Tri) Adjuvanted 2018 Influenza Vaccine PF 10/11/2013 Influenza Vaccine Split 2012, 2011 Pneumococcal Conjugate (PCV-13) 7/3/2018 Pneumococcal Polysaccharide (PPSV-23) 2014 Tdap 2015 Reviewed by Yohannes Washington MD on 2018 at  9:15 AM  
You Were Diagnosed With   
  
 Codes Comments Essential hypertension    -  Primary ICD-10-CM: I10 
ICD-9-CM: 401.9 Type 2 diabetes with nephropathy (HCC)     ICD-10-CM: E11.21 
ICD-9-CM: 250.40, 583.81 Mixed hyperlipidemia     ICD-10-CM: E78.2 ICD-9-CM: 272.2 Encounter for medication monitoring     ICD-10-CM: Z51.81 
ICD-9-CM: V58.83 Encounter for immunization     ICD-10-CM: Z62 ICD-9-CM: V03.89 Vitals BP Pulse Temp Resp Height(growth percentile) Weight(growth percentile) 134/78 79 97.4 °F (36.3 °C) (Oral) 16 5' 1\" (1.549 m) 159 lb 6.4 oz (72.3 kg) LMP SpO2 BMI OB Status Smoking Status 04/15/2002 97% 30.12 kg/m2 Postmenopausal Never Smoker Vitals History BMI and BSA Data Body Mass Index Body Surface Area  
 30.12 kg/m 2 1.76 m 2 Preferred Pharmacy Pharmacy Name Phone [id number]- [place], [address] AT [facility] [phone] Your Updated Medication List  
  
   
This list is accurate as of 9/24/18  9:42 AM.  Always use your most recent med list.  
  
  
  
  
 atorvastatin 20 mg tablet Commonly known as:  LIPITOR Take 1 Tab by mouth daily. glipiZIDE SR 5 mg CR tablet Commonly known as:  GLUCOTROL XL  
TAKE 2 TABLETS EVERY DAY IN THE MORNING PRIOR TO BREAKFAST  
  
 glucose blood VI test strips strip Commonly known as:  Ascensia CONTOUR  
#100 As directed Insulin Needles (Disposable) 31 gauge x 5/16\" Ndle Commonly known as:  BD ULTRA-FINE SHORT PEN NEEDLE Use to inject insulin daily  
  
 losartan-hydroCHLOROthiazide 50-12.5 mg per tablet Commonly known as:  HYZAAR  
TAKE 1 TABLET BY MOUTH EVERY DAY  
  
 pantoprazole 40 mg tablet Commonly known as:  PROTONIX Take 1 Tab by mouth daily. SITagliptin-metFORMIN 50-1,000 mg Tm24 Commonly known as:  JANUMET XR Take 2 Tabs by mouth daily (with breakfast). We Performed the Following AMB POC GLUCOSE, QUANTITATIVE, BLOOD [53089 CPT(R)] AMB POC HEMOGLOBIN A1C [54707 CPT(R)] INFLUENZA VACCINE INACTIVATED (IIV), SUBUNIT, ADJUVANTED, IM [id number] CPT(R)] LIPID PANEL [79256 CPT(R)] METABOLIC PANEL, BASIC [15709 CPT(R)] [place] IMMUNIZ ADMIN,1 SINGLE/COMB VAC/TOXOID [id number] CPT(R)] Follow-up Instructions Return in about 3 months (around 1/7/2019). Introducing [facility]!    
 [employer] introduces [employer] patient portal. Now you can access parts of your medical record, email your doctor's office, and request medication refills online. 1. In your internet browser, go to [email or web address] 2. Click on the First Time User? Click Here link in the Sign In box. You will see the New Member Sign Up page. 3. Enter your [employer] Access Code exactly as it appears below. You will not need to use this code after youve completed the sign-up process. If you do not sign up before the expiration date, you must request a new code. · [employer] Access Code: [email or web address] Expires: 9/24/2018 10:13 AM 
 
4. Enter the last four digits of your Social Security Number (xxxx) and Date of Birth (mm/dd/yyyy) as indicated and click Submit. You will be taken to the next sign-up page. 5. Create a [employer] ID. This will be your [employer] login ID and cannot be changed, so think of one that is secure and easy to remember. 6. Create a [employer] password. You can change your password at any time. 7. Enter your Password Reset Question and Answer. This can be used at a later time if you forget your password. 8. Enter your e-mail address. You will receive e-mail notification when new information is available in [address] 9. Click Sign Up. You can now view and download portions of your medical record. 10. Click the Download Summary menu link to download a portable copy of your medical information. If you have questions, please visit the Frequently Asked Questions section of the [employer] website. Remember, [employer] is NOT to be used for urgent needs. For medical emergencies, dial 911. Now available from your iPhone and Android! Please provide this summary of care documentation to your next provider. Your primary care clinician is listed as Abida Lomeli. If you have any questions after today's visit, please call [phone].

## 2018-09-24 NOTE — PROGRESS NOTES
Chief Complaint Patient presents with  Hypertension  
  follow up 1. Have you been to the ER, urgent care clinic since your last visit? Hospitalized since your last visit? NO 
 
2. Have you seen or consulted any other health care providers outside of the [address] [place] [address] since your last visit? Include any pap smears or colon screening. NO Concerns today: if patient does not eat by a certain time at lunch her sugar drops.

## 2018-09-24 NOTE — PROGRESS NOTES
HISTORY OF PRESENT ILLNESS Ángel Negron is a 75 y.o. female. HPI Follow up on chronic medical problems. Overall feeling well. HTN follow up: 
Compliant w/ meds, low salt diet, and exercise. No home bp monitoring. No swelling, headache or dizziness. No chest pain, SOB, palpitations. DM type II follow up: 
Compliant w/ meds, diabetic diet, and exercise. Stopped the insulin d/t \"it making her not feel well. \"  Her BS was too low. Has been checking her BS over the past several months and averaging in the 140s. .   
Denies any tingling sensation. Has had polyuria and polydipsia. No blurred vision. No significant weight changes. Hypercholesterolemia follow up: 
Compliant w/ low fat, low cholesterol diet. Has been taking the Lipitor nightly. Exercising some. No muscle nor no skin discoloration. Patient fasting today. Patient Active Problem List  
Diagnosis Code  Environmental allergies Z91.09  
 Hypovitaminosis D E55.9  Diabetes mellitus ([address] E11.9  Sarcoidosis D86.9  
 Essential hypertension I10  
 Hyperlipidemia E78.5  Encounter for medication monitoring Z51.81  Type 2 diabetes with nephropathy (HCC) E11.21 Current Outpatient Prescriptions Medication Sig Dispense Refill  pantoprazole (PROTONIX) 40 mg tablet Take 1 Tab by mouth daily. 90 Tab 3  
 atorvastatin (LIPITOR) 20 mg tablet Take 1 Tab by mouth daily. 90 Tab 3  
 SITagliptin-metFORMIN (JANUMET XR) 50-1,000 mg TM24 Take 2 Tabs by mouth daily (with breakfast). 180 Tab 3  
 Insulin Needles, Disposable, (BD ULTRA-FINE SHORT PEN NEEDLE) 31 gauge x 5/16\" ndle Use to inject insulin daily 100 Pen Needle 11  
 losartan-hydroCHLOROthiazide (HYZAAR) 50-12.5 mg per tablet TAKE 1 TABLET BY MOUTH EVERY DAY 30 Tab 11  
 glipiZIDE SR (GLUCOTROL XL) 5 mg CR tablet TAKE 2 TABLETS EVERY DAY IN THE MORNING PRIOR TO BREAKFAST 180 Tab 3  
 glucose blood VI test strips (ASCENSIA CONTOUR) strip #100 As directed 100 Strip 11 Allergies Allergen Reactions  Azithromycin Other (comments) Hallucinations Past Medical History:  
Diagnosis Date  Diabetes ([address] 2012  
 HTN (hypertension) 3/16/2010  Hypercholesterolemia  Sarcoidosis 1980s Past Surgical History:  
Procedure Laterality Date  ENDOSCOPY, COLON, DIAGNOSTIC  7/16/2007  
 repeat in 2010  HX ORTHOPAEDIC  09/2009  
 repair tear left knee  [place] COLONOSCOPY W/BIOPSY SINGLE/MULTIPLE  07/2010  
 repear in 5yr /dr. Venessa Mcclure. Family History Problem Relation Age of Onset  Cancer Mother   
  lung  Stroke Mother  Diabetes Father  Hypertension Father  Hypertension Sister  Diabetes Sister  Diabetes Maternal Grandmother Social History Substance Use Topics  Smoking status: Never Smoker  Smokeless tobacco: Never Used  Alcohol use No  
 
  
Lab Results Component Value Date/Time WBC 4.8 06/26/2018 10:08 AM  
HGB 12.5 06/26/2018 10:08 AM  
HCT 39.0 06/26/2018 10:08 AM  
PLATELET 258 61/99/9083 10:08 AM  
MCV 90 06/26/2018 10:08 AM  
 
Lab Results Component Value Date/Time Cholesterol, total 192 06/26/2018 10:08 AM  
HDL Cholesterol 55 06/26/2018 10:08 AM  
LDL, calculated 105 (H) 06/26/2018 10:08 AM  
Triglyceride 162 (H) 06/26/2018 10:08 AM  
CHOL/HDL Ratio 3.8 04/02/2010 01:39 PM  
 
Lab Results Component Value Date/Time TSH 0.836 03/06/2017 08:39 AM  
T4, Free 1.20 02/14/2014 12:30 PM  
  
Lab Results Component Value Date/Time  Sodium 139 06/26/2018 10:08 AM  
 Potassium 5.1 06/26/2018 10:08 AM  
 Chloride 99 06/26/2018 10:08 AM  
 CO2 24 06/26/2018 10:08 AM  
 Anion gap 8 04/02/2010 01:39 PM  
 Glucose 418 (H) 06/26/2018 10:08 AM  
 BUN 15 06/26/2018 10:08 AM  
 Creatinine 1.09 (H) 06/26/2018 10:08 AM  
 BUN/Creatinine ratio 14 06/26/2018 10:08 AM  
 GFR est AA 60 06/26/2018 10:08 AM  
 GFR est non-AA 52 (L) 06/26/2018 10:08 AM  
 Calcium 9.7 06/26/2018 10:08 AM  
 Bilirubin, total 0.6 06/26/2018 10:08 AM  
 ALT (SGPT) 17 06/26/2018 10:08 AM  
 AST (SGOT) 15 06/26/2018 10:08 AM  
 Alk. phosphatase 110 06/26/2018 10:08 AM  
 Protein, total 7.1 06/26/2018 10:08 AM  
 Albumin 4.3 06/26/2018 10:08 AM  
 Globulin 3.6 04/02/2010 01:39 PM  
 A-G Ratio 1.5 06/26/2018 10:08 AM  
  
Lab Results Component Value Date/Time Hemoglobin A1c 14.8 (H) 06/26/2018 10:08 AM  
 Hemoglobin A1c (POC) >15 06/26/2018 09:40 AM  
   
Review of Systems Constitutional: Negative for malaise/fatigue. HENT: Negative for congestion. Eyes: Negative for blurred vision. Respiratory: Negative for cough and shortness of breath. Cardiovascular: Negative for chest pain, palpitations and leg swelling. Gastrointestinal: Negative for abdominal pain, constipation and heartburn. Genitourinary: Negative for dysuria, frequency and urgency. Musculoskeletal: Negative for back pain and joint pain. Neurological: Negative for dizziness, tingling and headaches. Endo/Heme/Allergies: Negative for environmental allergies. Psychiatric/Behavioral: Negative for depression. The patient does not have insomnia. Physical Exam  
Constitutional: She appears well-developed and well-nourished. /78  Pulse 79  Temp 97.4 °F (36.3 °C) (Oral)   Resp 16  Ht 5' 1\" (1.549 m)  Wt 159 lb 6.4 oz (72.3 kg)  LMP 04/15/2002  SpO2 97%  BMI 30.12 kg/m2 HENT:  
Right Ear: Tympanic membrane and ear canal normal.  
Left Ear: Tympanic membrane and ear canal normal.  
Nose: No mucosal edema or rhinorrhea. Mouth/Throat: Oropharynx is clear and moist and mucous membranes are normal.  
Neck: Normal range of motion. Neck supple. No thyromegaly present. Cardiovascular: Normal rate, regular rhythm and normal heart sounds. Exam reveals no gallop. Pulmonary/Chest: Effort normal and breath sounds normal.  
Abdominal: Soft.  Normal appearance and bowel sounds are normal. She exhibits no mass. There is no tenderness. Musculoskeletal: Normal range of motion. She exhibits no edema. Foot exam done . Normal sensation to PP, LT and vibration. Sensation intact to microfilament testing. Pulses intact. No swelling. No skin lesions or sores noted. No tinea present. Lymphadenopathy:  
  She has no cervical adenopathy. Skin: Skin is warm and dry. Psychiatric: She has a normal mood and affect. Nursing note and vitals reviewed. ASSESSMENT and PLAN Diagnoses and all orders for this visit: 1. Essential hypertension Discussed sodium restriction, high k rich diet, maintaining ideal body weight and regular exercise program such as daily walking 30 min perday 4-5 times per week, as physiologic means to achieve blood pressure control.  Medication compliance advised. 2. Type 2 diabetes with nephropathy ([facility] [address] a1c level is at 7.3% down from 14.8% in May. Will continue off of the toujeo. Will continue with Janumet and glipizide. -     AMB POC HEMOGLOBIN A1C 
-     AMB POC GLUCOSE, QUANTITATIVE, BLOOD 3. Mixed hyperlipidemia -     LIPID PANEL 4. Encounter for medication monitoring -     METABOLIC PANEL, BASIC 5. Encounter for immunization -     AR IMMUNIZ ADMIN,1 SINGLE/COMB VAC/TOXOID 
-     Influenza Vaccine Inactivated (IIV) (FLUAD), Subunit, Adjuvanted, IM (61644) Follow-up Disposition: 
Return in about 3 months (around 1/7/2019). reviewed diet, exercise and weight control 
cardiovascular risk and specific lipid/LDL goals reviewed 
reviewed medications and side effects in detail 
specific diabetic recommendations: low cholesterol diet, weight control and daily exercise discussed, home glucose monitoring emphasized, all medications, side effects and compliance discussed carefully, foot care discussed and Podiatry visits discussed, annual eye examinations at Ophthalmology discussed and glycohemoglobin and other lab monitoring discussed I have discussed diagnosis listed in this note with pt and/or family. I have discussed treatment plans and options and the risk/benefit analysis of those options, including safe use of medications and possible medication side effects. Through the use of shared decision making we have agreed to the above plan. The patient has received an after-visit summary and questions were answered concerning future plans and follow up. Advise pt of any urgent changes then to proceed to the ER.

## 2018-09-25 LAB
BUN SERPL-MCNC: 16 MG/DL (ref 8–27)
BUN/CREAT SERPL: 14 (ref 12–28)
CALCIUM SERPL-MCNC: 9.6 MG/DL (ref 8.7–10.3)
CHLORIDE SERPL-SCNC: 105 MMOL/L (ref 96–106)
CHOLEST SERPL-MCNC: 150 MG/DL (ref 100–199)
CO2 SERPL-SCNC: 23 MMOL/L (ref 20–29)
CREAT SERPL-MCNC: 1.13 MG/DL (ref 0.57–1)
GLUCOSE SERPL-MCNC: 100 MG/DL (ref 65–99)
HDLC SERPL-MCNC: 63 MG/DL
INTERPRETATION, 910389: NORMAL
INTERPRETATION: NORMAL
LDLC SERPL CALC-MCNC: 73 MG/DL (ref 0–99)
PDF IMAGE, 910387: NORMAL
POTASSIUM SERPL-SCNC: 4.3 MMOL/L (ref 3.5–5.2)
SODIUM SERPL-SCNC: 143 MMOL/L (ref 134–144)
TRIGL SERPL-MCNC: 69 MG/DL (ref 0–149)
VLDLC SERPL CALC-MCNC: 14 MG/DL (ref 5–40)

## 2018-11-08 RX ORDER — GLIPIZIDE 5 MG/1
TABLET, FILM COATED, EXTENDED RELEASE ORAL
Qty: 180 TAB | Refills: 3 | Status: SHIPPED | OUTPATIENT
Start: 2018-11-08 | End: 2019-12-07 | Stop reason: SDUPTHER

## 2018-11-08 NOTE — TELEPHONE ENCOUNTER
Last Visit: 9/24  Next Appt: none  Previous Refill Encounter: 11/14/+3    Requested Prescriptions     Pending Prescriptions Disp Refills    glipiZIDE SR (GLUCOTROL XL) 5 mg CR tablet 180 Tab 3     Sig: TAKE 2 TABLETS EVERY DAY IN THE MORNING PRIOR TO BREAKFAST

## 2018-11-14 RX ORDER — LOSARTAN POTASSIUM AND HYDROCHLOROTHIAZIDE 12.5; 5 MG/1; MG/1
TABLET ORAL
Qty: 90 TAB | Refills: 3 | Status: SHIPPED | OUTPATIENT
Start: 2018-11-14 | End: 2019-05-29

## 2018-11-14 NOTE — TELEPHONE ENCOUNTER
Last Visit: 9/24  Next Appt: none  Previous Refill Encounter: 11/15/17-30+11    Requested Prescriptions     Pending Prescriptions Disp Refills    losartan-hydroCHLOROthiazide (HYZAAR) 50-12.5 mg per tablet 90 Tab 3     Sig: TAKE 1 TABLET BY MOUTH EVERY DAY

## 2019-02-08 ENCOUNTER — OFFICE VISIT (OUTPATIENT)
Dept: FAMILY MEDICINE CLINIC | Age: 69
End: 2019-02-08

## 2019-02-08 VITALS
HEART RATE: 80 BPM | SYSTOLIC BLOOD PRESSURE: 129 MMHG | OXYGEN SATURATION: 96 % | RESPIRATION RATE: 16 BRPM | HEIGHT: 61 IN | BODY MASS INDEX: 30.02 KG/M2 | TEMPERATURE: 97.2 F | WEIGHT: 159 LBS | DIASTOLIC BLOOD PRESSURE: 77 MMHG

## 2019-02-08 DIAGNOSIS — Z51.81 ENCOUNTER FOR MEDICATION MONITORING: ICD-10-CM

## 2019-02-08 DIAGNOSIS — E66.9 NON MORBID OBESITY: ICD-10-CM

## 2019-02-08 DIAGNOSIS — I10 ESSENTIAL HYPERTENSION: Primary | ICD-10-CM

## 2019-02-08 DIAGNOSIS — E78.2 MIXED HYPERLIPIDEMIA: ICD-10-CM

## 2019-02-08 DIAGNOSIS — E11.21 TYPE 2 DIABETES WITH NEPHROPATHY (HCC): ICD-10-CM

## 2019-02-08 LAB
GLUCOSE POC: 138 MG/DL
HBA1C MFR BLD HPLC: 6.7 %

## 2019-02-08 NOTE — PROGRESS NOTES
Chief Complaint Patient presents with  Diabetes  
  follow up  Cholesterol Problem  
  follow up  Hypertension  
  follow up  
 
microablumin 6/26/2018 Patient states she had a mammogram at [facility] 10/2018. Patient signed medical release. Colonoscopy 11/10/2015  repeat in 5 years Patient states last eye exam was 12/5/2017 by Dr. Dereck Osgood. Patient states she will be making an eye appt. 1. Have you been to the ER, urgent care clinic since your last visit? Hospitalized since your last visit? No 
 
2. Have you seen or consulted any other health care providers outside of the [address] [place] [address] since your last visit? Include any pap smears or colon screening.  no

## 2019-02-08 NOTE — PROGRESS NOTES
HISTORY OF PRESENT ILLNESS Erwin Coyle is a 70 y.o. female. HPI Follow up on chronic medical problems. Overall feeling well. HTN follow up: 
Compliant w/ meds, low salt diet, and exercise. No home bp monitoring. No swelling, headache or dizziness. No chest pain, SOB, palpitations. DM type II follow up: 
Compliant w/ meds, diabetic diet, and exercise. Stopped the insulin d/t \"it making her not feel well. \"  Her BS was too low. Has been checking her BS over the past several months and averaging in the 140s. .   
Denies any tingling sensation. Has had polyuria and polydipsia. No blurred vision. No significant weight changes. Hypercholesterolemia follow up: 
Compliant w/ low fat, low cholesterol diet. Has been taking the Lipitor nightly. Exercising some. No muscle nor no skin discoloration. Patient fasting today. HM: 
microablumin 6/26/2018 Mammogram at [facility] 10/2018. Patient signed medical release. Colonoscopy 11/10/2015  repeat in 5 years Last eye exam was 12/5/2017 by Dr. Danny Boateng. Patient states she will be making an eye appt. Patient Active Problem List  
Diagnosis Code  Environmental allergies Z91.09  
 Hypovitaminosis D E55.9  Diabetes mellitus ([address] E11.9  Sarcoidosis D86.9  
 Essential hypertension I10  
 Hyperlipidemia E78.5  Encounter for medication monitoring Z51.81  Type 2 diabetes with nephropathy (HCC) E11.21 Current Outpatient Medications Medication Sig Dispense Refill  losartan-hydroCHLOROthiazide (HYZAAR) 50-12.5 mg per tablet TAKE 1 TABLET BY MOUTH EVERY DAY 90 Tab 3  
 glipiZIDE SR (GLUCOTROL XL) 5 mg CR tablet TAKE 2 TABLETS EVERY DAY IN THE MORNING PRIOR TO BREAKFAST 180 Tab 3  pantoprazole (PROTONIX) 40 mg tablet Take 1 Tab by mouth daily. 90 Tab 3  
 atorvastatin (LIPITOR) 20 mg tablet Take 1 Tab by mouth daily.  90 Tab 3  
 SITagliptin-metFORMIN (JANUMET XR) 50-1,000 mg TM24 Take 2 Tabs by mouth daily (with breakfast). 180 Tab 3  
 Insulin Needles, Disposable, (BD ULTRA-FINE SHORT PEN NEEDLE) 31 gauge x 5/16\" ndle Use to inject insulin daily 100 Pen Needle 11  
 glucose blood VI test strips (ASCENSIA CONTOUR) strip #100 As directed 100 Strip 11 Allergies Allergen Reactions  Azithromycin Other (comments) Hallucinations Past Medical History:  
Diagnosis Date  Diabetes ([address] 2012  
 HTN (hypertension) 3/16/2010  Hypercholesterolemia  Sarcoidosis 1980s Past Surgical History:  
Procedure Laterality Date  ENDOSCOPY, COLON, DIAGNOSTIC  7/16/2007  
 repeat in 2010  HX ORTHOPAEDIC  09/2009  
 repair tear left knee  [place] COLONOSCOPY W/BIOPSY SINGLE/MULTIPLE  07/2010  
 repear in 5yr /dr. Skyla Humphrey. Family History Problem Relation Age of Onset  Cancer Mother   
     lung  Stroke Mother  Diabetes Father  Hypertension Father  Hypertension Sister  Diabetes Sister  Diabetes Maternal Grandmother Social History Tobacco Use  Smoking status: Never Smoker  Smokeless tobacco: Never Used Substance Use Topics  Alcohol use: No  
  Alcohol/week: 0.0 oz Lab Results Component Value Date/Time WBC 4.8 06/26/2018 10:08 AM  
 HGB 12.5 06/26/2018 10:08 AM  
 HCT 39.0 06/26/2018 10:08 AM  
 PLATELET 495 89/17/8659 10:08 AM  
 MCV 90 06/26/2018 10:08 AM  
 
Lab Results Component Value Date/Time Cholesterol, total 150 09/24/2018 09:31 AM  
 HDL Cholesterol 63 09/24/2018 09:31 AM  
 LDL, calculated 73 09/24/2018 09:31 AM  
 Triglyceride 69 09/24/2018 09:31 AM  
 CHOL/HDL Ratio 3.8 04/02/2010 01:39 PM  
 
Lab Results Component Value Date/Time TSH 0.836 03/06/2017 08:39 AM  
 T4, Free 1.20 02/14/2014 12:30 PM  
  
Lab Results Component Value Date/Time  Sodium 143 09/24/2018 09:31 AM  
 Potassium 4.3 09/24/2018 09:31 AM  
 Chloride 105 09/24/2018 09:31 AM  
 CO2 23 09/24/2018 09:31 AM  
 Anion gap 8 04/02/2010 01:39 PM  
 Glucose 100 (H) 09/24/2018 09:31 AM  
 BUN 16 09/24/2018 09:31 AM  
 Creatinine 1.13 (H) 09/24/2018 09:31 AM  
 BUN/Creatinine ratio 14 09/24/2018 09:31 AM  
 GFR est AA 58 (L) 09/24/2018 09:31 AM  
 GFR est non-AA 50 (L) 09/24/2018 09:31 AM  
 Calcium 9.6 09/24/2018 09:31 AM  
 Bilirubin, total 0.6 06/26/2018 10:08 AM  
 ALT (SGPT) 17 06/26/2018 10:08 AM  
 AST (SGOT) 15 06/26/2018 10:08 AM  
 Alk. phosphatase 110 06/26/2018 10:08 AM  
 Protein, total 7.1 06/26/2018 10:08 AM  
 Albumin 4.3 06/26/2018 10:08 AM  
 Globulin 3.6 04/02/2010 01:39 PM  
 A-G Ratio 1.5 06/26/2018 10:08 AM  
  
Lab Results Component Value Date/Time Hemoglobin A1c 14.8 (H) 06/26/2018 10:08 AM  
 Hemoglobin A1c (POC) 7.3 09/24/2018 09:31 AM  
   
Review of Systems Constitutional: Negative for malaise/fatigue. HENT: Negative for congestion. Eyes: Negative for blurred vision. Respiratory: Negative for cough and shortness of breath. Cardiovascular: Negative for chest pain, palpitations and leg swelling. Gastrointestinal: Negative for abdominal pain, constipation and heartburn. Genitourinary: Negative for dysuria, frequency and urgency. Musculoskeletal: Negative for back pain and joint pain. Neurological: Negative for dizziness, tingling and headaches. Endo/Heme/Allergies: Negative for environmental allergies. Psychiatric/Behavioral: Negative for depression. The patient does not have insomnia. Physical Exam  
Constitutional: She appears well-developed and well-nourished. /77 (BP 1 Location: Left arm, BP Patient Position: Sitting)   Pulse 80   Temp 97.2 °F (36.2 °C) (Oral)   Resp 16   Ht 5' 1\" (1.549 m)   Wt 159 lb (72.1 kg)   LMP 04/15/2002   SpO2 96%   BMI 30.04 kg/m² HENT:  
Right Ear: Tympanic membrane and ear canal normal.  
Left Ear: Tympanic membrane and ear canal normal.  
Nose: No mucosal edema or rhinorrhea. Mouth/Throat: Oropharynx is clear and moist and mucous membranes are normal.  
Neck: Normal range of motion. Neck supple. No thyromegaly present. Cardiovascular: Normal rate, regular rhythm and normal heart sounds. Exam reveals no gallop. Pulmonary/Chest: Effort normal and breath sounds normal.  
Abdominal: Soft. Normal appearance and bowel sounds are normal. She exhibits no mass. There is no tenderness. Musculoskeletal: Normal range of motion. She exhibits no edema. Foot exam done . Normal sensation to PP, LT and vibration. Sensation intact to microfilament testing. Pulses intact. No swelling. No skin lesions or sores noted. No tinea present. Lymphadenopathy:  
  She has no cervical adenopathy. Skin: Skin is warm and dry. Psychiatric: She has a normal mood and affect. Nursing note and vitals reviewed. ASSESSMENT and PLAN Diagnoses and all orders for this visit: 1. Essential hypertension Stable 2. Type 2 diabetes with nephropathy (HCC) a1c level is improved to 6.7%! 
-     AMB POC HEMOGLOBIN A1C 
-     AMB POC GLUCOSE, QUANTITATIVE, BLOOD 
-     HM DIABETES FOOT EXAM 
 
3. Mixed hyperlipidemia -     LIPID PANEL 4. Encounter for medication monitoring -     METABOLIC PANEL, COMPREHENSIVE 5. Non morbid obesity I have reviewed/discussed the above normal BMI with the patient. I have recommended the following interventions: dietary management education, guidance, and counseling . Follow-up Disposition: 
Return in about 4 months (around 6/17/2019).  
reviewed diet, exercise and weight control 
cardiovascular risk and specific lipid/LDL goals reviewed 
reviewed medications and side effects in detail 
specific diabetic recommendations: low cholesterol diet, weight control and daily exercise discussed, home glucose monitoring emphasized, all medications, side effects and compliance discussed carefully, foot care discussed and Podiatry visits discussed, annual eye examinations at Ophthalmology discussed and glycohemoglobin and other lab monitoring discussed I have discussed diagnosis listed in this note with pt and/or family. I have discussed treatment plans and options and the risk/benefit analysis of those options, including safe use of medications and possible medication side effects. Through the use of shared decision making we have agreed to the above plan. The patient has received an after-visit summary and questions were answered concerning future plans and follow up. Advise pt of any urgent changes then to proceed to the ER.

## 2019-02-09 LAB
ALBUMIN SERPL-MCNC: 4.4 G/DL (ref 3.6–4.8)
ALBUMIN/GLOB SERPL: 1.6 {RATIO} (ref 1.2–2.2)
ALP SERPL-CCNC: 73 IU/L (ref 39–117)
ALT SERPL-CCNC: 14 IU/L (ref 0–32)
AST SERPL-CCNC: 14 IU/L (ref 0–40)
BILIRUB SERPL-MCNC: 0.4 MG/DL (ref 0–1.2)
BUN SERPL-MCNC: 19 MG/DL (ref 8–27)
BUN/CREAT SERPL: 16 (ref 12–28)
CALCIUM SERPL-MCNC: 10.3 MG/DL (ref 8.7–10.3)
CHLORIDE SERPL-SCNC: 101 MMOL/L (ref 96–106)
CHOLEST SERPL-MCNC: 143 MG/DL (ref 100–199)
CO2 SERPL-SCNC: 22 MMOL/L (ref 20–29)
CREAT SERPL-MCNC: 1.16 MG/DL (ref 0.57–1)
GLOBULIN SER CALC-MCNC: 2.7 G/DL (ref 1.5–4.5)
GLUCOSE SERPL-MCNC: 136 MG/DL (ref 65–99)
HDLC SERPL-MCNC: 60 MG/DL
INTERPRETATION, 910389: NORMAL
INTERPRETATION: NORMAL
LDLC SERPL CALC-MCNC: 69 MG/DL (ref 0–99)
PDF IMAGE, 910387: NORMAL
POTASSIUM SERPL-SCNC: 4.5 MMOL/L (ref 3.5–5.2)
PROT SERPL-MCNC: 7.1 G/DL (ref 6–8.5)
SODIUM SERPL-SCNC: 141 MMOL/L (ref 134–144)
TRIGL SERPL-MCNC: 69 MG/DL (ref 0–149)
VLDLC SERPL CALC-MCNC: 14 MG/DL (ref 5–40)

## 2019-03-18 NOTE — TELEPHONE ENCOUNTER
----- Message from Aleksandar Tanner sent at 3/18/2019 10:46 AM EDT -----  Regarding: Dr. Jennifer Richards  Pt is requesting that the doctor contact her about her \"Losartan\" being recalled and wanted to discuss her \"Atorvastatin\".  Best contact number [phone]

## 2019-04-15 DIAGNOSIS — I10 ESSENTIAL HYPERTENSION: Primary | ICD-10-CM

## 2019-04-15 RX ORDER — OLMESARTAN MEDOXOMIL AND HYDROCHLOROTHIAZIDE 20/12.5 20; 12.5 MG/1; MG/1
1 TABLET ORAL DAILY
Qty: 90 TAB | Refills: 3 | Status: SHIPPED | OUTPATIENT
Start: 2019-04-15 | End: 2020-03-31 | Stop reason: SDUPTHER

## 2019-04-15 NOTE — TELEPHONE ENCOUNTER
----- Message from Ivy Grigsby sent at 4/12/2019  4:51 PM EDT -----  Regarding: Dr. Joanie Elise requesting a phone call in regards to her BP medication \"Losartan\". Best contact [phone].

## 2019-04-15 NOTE — TELEPHONE ENCOUNTER
Patient does not want to take Losartan due to the recall. Per Dr. Tillman changed to [place], patient notified.

## 2019-04-16 NOTE — TELEPHONE ENCOUNTER
----- Message from Cristino Monk sent at 4/16/2019  3:54 PM EDT -----  Regarding: Alex Glez - MD/ telephone  Pt missed a call this can be in reference to her BP pressure medication. Pt received 2 different prescriptions olmesartan-hctz and pantoprazole.    Pt's contact [phone] or [phone]

## 2019-05-29 ENCOUNTER — OFFICE VISIT (OUTPATIENT)
Dept: FAMILY MEDICINE CLINIC | Age: 69
End: 2019-05-29

## 2019-05-29 VITALS
BODY MASS INDEX: 29.57 KG/M2 | TEMPERATURE: 97.2 F | WEIGHT: 156.6 LBS | RESPIRATION RATE: 16 BRPM | HEART RATE: 69 BPM | SYSTOLIC BLOOD PRESSURE: 120 MMHG | HEIGHT: 61 IN | OXYGEN SATURATION: 98 % | DIASTOLIC BLOOD PRESSURE: 70 MMHG

## 2019-05-29 DIAGNOSIS — I10 ESSENTIAL HYPERTENSION: Primary | ICD-10-CM

## 2019-05-29 DIAGNOSIS — E11.21 TYPE 2 DIABETES WITH NEPHROPATHY (HCC): ICD-10-CM

## 2019-05-29 DIAGNOSIS — Z23 NEED FOR SHINGLES VACCINE: ICD-10-CM

## 2019-05-29 DIAGNOSIS — E66.9 NON MORBID OBESITY: ICD-10-CM

## 2019-05-29 DIAGNOSIS — Z51.81 ENCOUNTER FOR MEDICATION MONITORING: ICD-10-CM

## 2019-05-29 DIAGNOSIS — E78.2 MIXED HYPERLIPIDEMIA: ICD-10-CM

## 2019-05-29 LAB
GLUCOSE POC: 119 MG/DL
HBA1C MFR BLD HPLC: 6.7 %

## 2019-05-29 NOTE — PROGRESS NOTES
HISTORY OF PRESENT ILLNESS  Tash Levine is a 70 y.o. female. HPI   Follow up on chronic medical problems. Overall feeling well. HTN follow up:  Compliant w/ meds, low salt diet, and exercise. Tolerating change to benicar HCT. No home bp monitoring. No swelling, headache or dizziness. No chest pain, SOB, palpitations. DM type II follow up:  Compliant w/ meds, diabetic diet, and exercise. Has been checking her BS over the past several months and averaging in the 140s. Last A1c level was 6.7% in 2/2019. Denies any tingling sensation. No polyuria and polydipsia. No blurred vision. No significant weight changes. Hypercholesterolemia follow up:  Compliant w/ low fat, low cholesterol diet. Has been taking the Lipitor nightly. Exercising some. No muscle nor no skin discoloration. Patient fasting today. HM:  Microalbumin 2/8/2019  Mammogram 10/16/2018  Colonoscopy 11/10/2015 by Dr. Eli García repeat in 5 years  Eye exam 10/16/2018      Patient Active Problem List   Diagnosis Code    Environmental allergies Z91.09    Hypovitaminosis D E55.9    Diabetes mellitus ([facility] E11.9    Sarcoidosis D86.9    Essential hypertension I10    Hyperlipidemia E78.5    Encounter for medication monitoring Z51.81    [facility] E11.21       Current Outpatient Medications   Medication Sig Dispense Refill    olmesartan-hydroCHLOROthiazide (BENICAR HCT) 20-12.5 mg per tablet Take 1 Tab by mouth daily. 90 Tab 3    losartan-hydroCHLOROthiazide (HYZAAR) 50-12.5 mg per tablet TAKE 1 TABLET BY MOUTH EVERY DAY 90 Tab 3    glipiZIDE SR (GLUCOTROL XL) 5 mg CR tablet TAKE 2 TABLETS EVERY DAY IN THE MORNING PRIOR TO BREAKFAST 180 Tab 3    pantoprazole (PROTONIX) 40 mg tablet Take 1 Tab by mouth daily. 90 Tab 3    atorvastatin (LIPITOR) 20 mg tablet Take 1 Tab by mouth daily. 90 Tab 3    SITagliptin-metFORMIN (JANUMET XR) 50-1,000 mg TM24 Take 2 Tabs by mouth daily (with breakfast).  180 Tab 3  Insulin Needles, Disposable, (BD ULTRA-FINE SHORT PEN NEEDLE) 31 gauge x 5/16\" ndle Use to inject insulin daily 100 Pen Needle 11    glucose blood VI test strips (ASCENSIA CONTOUR) strip #100  As directed 100 Strip 11       Allergies   Allergen Reactions    Azithromycin Other (comments)     Hallucinations         Past Medical History:   Diagnosis Date    Diabetes ([address] 2012    HTN (hypertension) 3/16/2010    Hypercholesterolemia     Sarcoidosis 1980s         Past Surgical History:   Procedure Laterality Date    ENDOSCOPY, COLON, DIAGNOSTIC  7/16/2007    repeat in 2010    HX ORTHOPAEDIC  09/2009    repair tear left knee    [place] COLONOSCOPY W/BIOPSY SINGLE/MULTIPLE  07/2010    repear in 5yr /dr. Yunior Kurtz.          Family History   Problem Relation Age of Onset   [facility] Cancer Mother         lung    Stroke Mother     Diabetes Father     Hypertension Father     Hypertension Sister     Diabetes Sister     Diabetes Maternal Grandmother        Social History     Tobacco Use    Smoking status: Never Smoker    Smokeless tobacco: Never Used   Substance Use Topics    Alcohol use: No     Alcohol/week: 0.0 oz        Lab Results   Component Value Date/Time    WBC 4.8 06/26/2018 10:08 AM    HGB 12.5 06/26/2018 10:08 AM    HCT 39.0 06/26/2018 10:08 AM    PLATELET 734 00/51/8517 10:08 AM    MCV 90 06/26/2018 10:08 AM     Lab Results   Component Value Date/Time    Cholesterol, total 143 02/08/2019 12:15 PM    HDL Cholesterol 60 02/08/2019 12:15 PM    LDL, calculated 69 02/08/2019 12:15 PM    Triglyceride 69 02/08/2019 12:15 PM    CHOL/HDL Ratio 3.8 04/02/2010 01:39 PM     Lab Results   Component Value Date/Time    TSH 0.836 03/06/2017 08:39 AM    T4, Free 1.20 02/14/2014 12:30 PM      Lab Results   Component Value Date/Time    Sodium 141 02/08/2019 12:15 PM    Potassium 4.5 02/08/2019 12:15 PM    Chloride 101 02/08/2019 12:15 PM    CO2 22 02/08/2019 12:15 PM    Anion gap 8 04/02/2010 01:39 PM    Glucose 136 (H) 02/08/2019 12:15 PM    BUN 19 02/08/2019 12:15 PM    Creatinine 1.16 (H) 02/08/2019 12:15 PM    BUN/Creatinine ratio 16 02/08/2019 12:15 PM    GFR est AA 56 (L) 02/08/2019 12:15 PM    GFR est non-AA 48 (L) 02/08/2019 12:15 PM    Calcium 10.3 02/08/2019 12:15 PM    Bilirubin, total 0.4 02/08/2019 12:15 PM    ALT (SGPT) 14 02/08/2019 12:15 PM    AST (SGOT) 14 02/08/2019 12:15 PM    Alk. phosphatase 73 02/08/2019 12:15 PM    Protein, total 7.1 02/08/2019 12:15 PM    Albumin 4.4 02/08/2019 12:15 PM    Globulin 3.6 04/02/2010 01:39 PM    A-G Ratio 1.6 02/08/2019 12:15 PM      Lab Results   Component Value Date/Time    Hemoglobin A1c 14.8 (H) 06/26/2018 10:08 AM    Hemoglobin A1c (POC) 6.7 02/08/2019 08:56 AM         Review of Systems   Constitutional: Negative for malaise/fatigue. HENT: Negative for congestion. Eyes: Negative for blurred vision. Respiratory: Negative for cough and shortness of breath. Cardiovascular: Negative for chest pain, palpitations and leg swelling. Gastrointestinal: Negative for abdominal pain, constipation and heartburn. Genitourinary: Negative for dysuria, frequency and urgency. Musculoskeletal: Negative for back pain and joint pain. Neurological: Negative for dizziness, tingling and headaches. Endo/Heme/Allergies: Negative for environmental allergies. Psychiatric/Behavioral: Negative for depression. The patient does not have insomnia. Physical Exam   Constitutional: She appears well-developed and well-nourished. /70   Pulse 69   Temp 97.2 °F (36.2 °C) (Oral)   Resp 16   Ht 5' 1\" (1.549 m)   Wt 156 lb 9.6 oz (71 kg)   LMP 04/15/2002   SpO2 98%   BMI 29.59 kg/m²    HENT:   Right Ear: Tympanic membrane and ear canal normal.   Left Ear: Tympanic membrane and ear canal normal.   Nose: No mucosal edema or rhinorrhea. Mouth/Throat: Oropharynx is clear and moist and mucous membranes are normal.   Neck: Normal range of motion. Neck supple. No thyromegaly present. Cardiovascular: Normal rate, regular rhythm and normal heart sounds. Exam reveals no gallop. Pulmonary/Chest: Effort normal and breath sounds normal.   Abdominal: Soft. Normal appearance and bowel sounds are normal. She exhibits no mass. There is no tenderness. Musculoskeletal: Normal range of motion. She exhibits no edema. Lymphadenopathy:     She has no cervical adenopathy. Skin: Skin is warm and dry. Psychiatric: She has a normal mood and affect. Nursing note and vitals reviewed. ASSESSMENT and PLAN  Diagnoses and all orders for this visit:    1. Essential hypertension  Stable   Discussed sodium restriction, high k rich diet, maintaining ideal body weight and regular exercise program such as daily walking 30 min perday 4-5 times per week, as physiologic means to achieve blood pressure control.  Medication compliance advised. 2. Type 2 diabetes with nephropathy (HCC)  -     AMB POC HEMOGLOBIN A1C  -     AMB POC GLUCOSE, QUANTITATIVE, BLOOD    3. Mixed hyperlipidemia  Continue to monitor. Work on diet and exercise. 4. Encounter for medication monitoring  -     METABOLIC PANEL, BASIC  -     CBC W/O DIFF    5. Non morbid obesity  I have reviewed/discussed the above normal BMI with the patient. I have recommended the following interventions: dietary management education, guidance, and counseling . 6. Need for shingles vaccine  -     varicella-zoster recombinant, PF, (SHINGRIX) 50 mcg/0.5 mL susr injection; 0.5 mL by IntraMUSCular route once for 1 dose. Repeat second dose in 6 months.       Follow-up and Dispositions    · Return in about 5 months (around 10/29/2019) for physical.       reviewed diet, exercise and weight control  cardiovascular risk and specific lipid/LDL goals reviewed  reviewed medications and side effects in detail  specific diabetic recommendations: low cholesterol diet, weight control and daily exercise discussed, home glucose monitoring emphasized, foot care discussed and Podiatry visits discussed, annual eye examinations at Ophthalmology discussed and glycohemoglobin and other lab monitoring discussed     I have discussed diagnosis listed in this note with pt and/or family. I have discussed treatment plans and options and the risk/benefit analysis of those options, including safe use of medications and possible medication side effects. Through the use of shared decision making we have agreed to the above plan. The patient has received an after-visit summary and questions were answered concerning future plans and follow up. Advise pt of any urgent changes then to proceed to the ER.

## 2019-05-29 NOTE — PROGRESS NOTES
Chief Complaint   Patient presents with    Cholesterol Problem     follow up    Diabetes     follow up    Hypertension     follow up     A1C 2/8/2019    Microalbumin 2/8/2019    Mammogram 10/16/2018    Colonoscopy 11/10/2015 by Dr. Aki Morales repeat in 5 years    Eye exam 10/16/2018    1. Have you been to the ER, urgent care clinic since your last visit? Hospitalized since your last visit? No    2. Have you seen or consulted any other health care providers outside of the [facility] since your last visit? Include any pap smears or colon screening.  No

## 2019-05-30 LAB
BUN SERPL-MCNC: 15 MG/DL (ref 8–27)
BUN/CREAT SERPL: 14 (ref 12–28)
CALCIUM SERPL-MCNC: 10.3 MG/DL (ref 8.7–10.3)
CHLORIDE SERPL-SCNC: 102 MMOL/L (ref 96–106)
CO2 SERPL-SCNC: 21 MMOL/L (ref 20–29)
CREAT SERPL-MCNC: 1.05 MG/DL (ref 0.57–1)
ERYTHROCYTE [DISTWIDTH] IN BLOOD BY AUTOMATED COUNT: 14.1 % (ref 12.3–15.4)
GLUCOSE SERPL-MCNC: 118 MG/DL (ref 65–99)
HCT VFR BLD AUTO: 36.2 % (ref 34–46.6)
HGB BLD-MCNC: 12 G/DL (ref 11.1–15.9)
INTERPRETATION: NORMAL
MCH RBC QN AUTO: 29.6 PG (ref 26.6–33)
MCHC RBC AUTO-ENTMCNC: 33.1 G/DL (ref 31.5–35.7)
MCV RBC AUTO: 89 FL (ref 79–97)
PLATELET # BLD AUTO: 295 X10E3/UL (ref 150–450)
POTASSIUM SERPL-SCNC: 4.4 MMOL/L (ref 3.5–5.2)
RBC # BLD AUTO: 4.05 X10E6/UL (ref 3.77–5.28)
SODIUM SERPL-SCNC: 140 MMOL/L (ref 134–144)
WBC # BLD AUTO: 5.6 X10E3/UL (ref 3.4–10.8)

## 2019-09-08 RX ORDER — ATORVASTATIN CALCIUM 20 MG/1
TABLET, FILM COATED ORAL
Qty: 90 TAB | Refills: 3 | Status: SHIPPED | OUTPATIENT
Start: 2019-09-08 | End: 2020-09-03 | Stop reason: SDUPTHER

## 2019-09-30 RX ORDER — SITAGLIPTIN AND METFORMIN HYDROCHLORIDE 50; 1000 MG/1; MG/1
TABLET, FILM COATED, EXTENDED RELEASE ORAL
Qty: 180 TAB | Refills: 3 | Status: SHIPPED | OUTPATIENT
Start: 2019-09-30 | End: 2020-09-23 | Stop reason: SDUPTHER

## 2020-02-04 ENCOUNTER — OFFICE VISIT (OUTPATIENT)
Dept: FAMILY MEDICINE CLINIC | Age: 70
End: 2020-02-04

## 2020-02-04 VITALS
DIASTOLIC BLOOD PRESSURE: 70 MMHG | OXYGEN SATURATION: 98 % | HEART RATE: 70 BPM | HEIGHT: 61 IN | BODY MASS INDEX: 29.53 KG/M2 | WEIGHT: 156.4 LBS | TEMPERATURE: 97.1 F | SYSTOLIC BLOOD PRESSURE: 136 MMHG | RESPIRATION RATE: 18 BRPM

## 2020-02-04 DIAGNOSIS — Z00.00 ROUTINE GENERAL MEDICAL EXAMINATION AT A HEALTH CARE FACILITY: Primary | ICD-10-CM

## 2020-02-04 DIAGNOSIS — Z12.11 ENCOUNTER FOR SCREENING FECAL OCCULT BLOOD TESTING: ICD-10-CM

## 2020-02-04 DIAGNOSIS — I10 ESSENTIAL HYPERTENSION: ICD-10-CM

## 2020-02-04 DIAGNOSIS — E78.2 MIXED HYPERLIPIDEMIA: ICD-10-CM

## 2020-02-04 DIAGNOSIS — Z51.81 ENCOUNTER FOR MEDICATION MONITORING: ICD-10-CM

## 2020-02-04 DIAGNOSIS — E11.21 TYPE 2 DIABETES WITH NEPHROPATHY (HCC): ICD-10-CM

## 2020-02-04 DIAGNOSIS — E66.9 NON MORBID OBESITY: ICD-10-CM

## 2020-02-04 LAB
BILIRUB UR QL STRIP: NEGATIVE
GLUCOSE POC: 114 MG/DL
GLUCOSE UR-MCNC: NEGATIVE MG/DL
HBA1C MFR BLD HPLC: 6.5 %
HEMOCCULT STL QL: NEGATIVE
KETONES P FAST UR STRIP-MCNC: NORMAL MG/DL
PH UR STRIP: 5 [PH] (ref 4.6–8)
PROT UR QL STRIP: NEGATIVE
SP GR UR STRIP: 1.02 (ref 1–1.03)
UA UROBILINOGEN AMB POC: NORMAL (ref 0.2–1)
URINALYSIS CLARITY POC: CLEAR
URINALYSIS COLOR POC: YELLOW
URINE BLOOD POC: NEGATIVE
URINE LEUKOCYTES POC: NEGATIVE
URINE NITRITES POC: NEGATIVE
VALID INTERNAL CONTROL?: YES

## 2020-02-04 NOTE — PROGRESS NOTES
HISTORY OF PRESENT ILLNESS  Mina Acosta is a 78 y.o. female. HPI   Follow up on chronic medical problems. Overall feeling well. HTN follow up:  Compliant w/ meds, low salt diet, and exercise. Tolerating change to benicar HCT. No home bp monitoring. No swelling, headache or dizziness. No chest pain, SOB, palpitations. DM type II follow up:  Compliant w/ meds, diabetic diet, and exercise. Has been checking her BS over the past several months and averaging in the 140s. Last A1c level was 6.7% in 2/2019. Denies any tingling sensation. No polyuria and polydipsia. No blurred vision. No significant weight changes. Hypercholesterolemia follow up:  Compliant w/ low fat, low cholesterol diet. Has been taking the Lipitor nightly. Exercising some. No muscle nor no skin discoloration. Patient fasting today. HM:  Microalbumin 2/8/2019  Mammogram 10/16/2018  Colonoscopy 11/10/2015 by Dr. Irina Crum repeat in 5 years  Eye exam 10/16/2018      Patient Active Problem List   Diagnosis Code    Environmental allergies Z91.09    Hypovitaminosis D E55.9    Diabetes mellitus ([facility] [address] E11.9    Sarcoidosis D86.9    Essential hypertension I10    Hyperlipidemia E78.5    Encounter for medication monitoring Z51.81    [facility] E11.21       Current Outpatient Medications   Medication Sig Dispense Refill    glipiZIDE SR (GLUCOTROL XL) 5 mg CR tablet TAKE 2 TABLETS EVERY DAY IN THE MORNING PRIOR TO BREAKFAST 180 Tab 0    pantoprazole (PROTONIX) 40 mg tablet TAKE 1 TABLET BY MOUTH EVERY DAY 90 Tab 1    JANUMET XR 50-1,000 mg TM24 TAKE 2 TABS BY MOUTH DAILY (WITH BREAKFAST). 180 Tab 3    atorvastatin (LIPITOR) 20 mg tablet TAKE 1 TABLET BY MOUTH EVERY DAY 90 Tab 3    olmesartan-hydroCHLOROthiazide (BENICAR HCT) 20-12.5 mg per tablet Take 1 Tab by mouth daily.  90 Tab 3    Insulin Needles, Disposable, (BD ULTRA-FINE SHORT PEN NEEDLE) 31 gauge x 5/16\" ndle Use to inject insulin daily 100 Pen Needle 11    glucose blood VI test strips (ASCENSIA CONTOUR) strip #100  As directed 100 Strip 11       Allergies   Allergen Reactions    Azithromycin Other (comments)     Hallucinations         Past Medical History:   Diagnosis Date    Diabetes ([address] 2012    HTN (hypertension) 3/16/2010    Hypercholesterolemia     Sarcoidosis 1980s         Past Surgical History:   Procedure Laterality Date    ENDOSCOPY, COLON, DIAGNOSTIC  7/16/2007    repeat in 2010    HX ORTHOPAEDIC  09/2009    repair tear left knee    [place] COLONOSCOPY W/BIOPSY SINGLE/MULTIPLE  07/2010    repear in 5yr /dr. Paty Roque.          Family History   Problem Relation Age of Onset   [facility] Cancer Mother         lung    Stroke Mother     Diabetes Father     Hypertension Father     Hypertension Sister     Diabetes Sister     Diabetes Maternal Grandmother        Social History     Tobacco Use    Smoking status: Never Smoker    Smokeless tobacco: Never Used   Substance Use Topics    Alcohol use: No     Alcohol/week: 0.0 standard drinks        Lab Results   Component Value Date/Time    WBC 5.6 05/29/2019 09:51 AM    HGB 12.0 05/29/2019 09:51 AM    HCT 36.2 05/29/2019 09:51 AM    PLATELET 839 36/93/0184 09:51 AM    MCV 89 05/29/2019 09:51 AM     Lab Results   Component Value Date/Time    Cholesterol, total 143 02/08/2019 12:15 PM    HDL Cholesterol 60 02/08/2019 12:15 PM    LDL, calculated 69 02/08/2019 12:15 PM    Triglyceride 69 02/08/2019 12:15 PM    CHOL/HDL Ratio 3.8 04/02/2010 01:39 PM     Lab Results   Component Value Date/Time    TSH 0.836 03/06/2017 08:39 AM    T4, Free 1.20 02/14/2014 12:30 PM      Lab Results   Component Value Date/Time    Sodium 140 05/29/2019 09:51 AM    Potassium 4.4 05/29/2019 09:51 AM    Chloride 102 05/29/2019 09:51 AM    CO2 21 05/29/2019 09:51 AM    Anion gap 8 04/02/2010 01:39 PM    Glucose 118 (H) 05/29/2019 09:51 AM    BUN 15 05/29/2019 09:51 AM    Creatinine 1.05 (H) 05/29/2019 09:51 AM    BUN/Creatinine ratio 14 05/29/2019 09:51 AM    GFR est AA 63 05/29/2019 09:51 AM    GFR est non-AA 54 (L) 05/29/2019 09:51 AM    Calcium 10.3 05/29/2019 09:51 AM    Bilirubin, total 0.4 02/08/2019 12:15 PM    ALT (SGPT) 14 02/08/2019 12:15 PM    AST (SGOT) 14 02/08/2019 12:15 PM    Alk. phosphatase 73 02/08/2019 12:15 PM    Protein, total 7.1 02/08/2019 12:15 PM    Albumin 4.4 02/08/2019 12:15 PM    Globulin 3.6 04/02/2010 01:39 PM    A-G Ratio 1.6 02/08/2019 12:15 PM      Lab Results   Component Value Date/Time    Hemoglobin A1c 14.8 (H) 06/26/2018 10:08 AM    Hemoglobin A1c (POC) 6.7 05/29/2019 09:51 AM         Review of Systems   Constitutional: Negative for malaise/fatigue. HENT: Negative for congestion. Eyes: Negative for blurred vision. Respiratory: Negative for cough and shortness of breath. Cardiovascular: Negative for chest pain, palpitations and leg swelling. Gastrointestinal: Negative for abdominal pain, constipation and heartburn. Genitourinary: Negative for dysuria, frequency and urgency. Musculoskeletal: Negative for back pain and joint pain. Neurological: Negative for dizziness, tingling and headaches. Endo/Heme/Allergies: Negative for environmental allergies. Psychiatric/Behavioral: Negative for depression. The patient does not have insomnia. Physical Exam   Constitutional: She is oriented to person, place, and time. She appears well-developed and well-nourished. /70 (BP 1 Location: Left arm, BP Patient Position: Sitting)   Pulse 70   Temp 97.1 °F (36.2 °C) (Oral)   Resp 18   Ht 5' 0.5\" (1.537 m)   Wt 156 lb 6.4 oz (70.9 kg)   LMP 04/15/2002   SpO2 98%   BMI 30.04 kg/m²      HENT:   Right Ear: Tympanic membrane and ear canal normal.   Left Ear: Tympanic membrane and ear canal normal.   Nose: No mucosal edema or rhinorrhea. Mouth/Throat: Oropharynx is clear and moist and mucous membranes are normal.   Neck: Normal range of motion. Neck supple. No thyromegaly present. Cardiovascular: Normal rate, regular rhythm and normal heart sounds. Exam reveals no gallop. Pulmonary/Chest: Effort normal and breath sounds normal. Right breast exhibits no inverted nipple, no mass, no nipple discharge, no skin change and no tenderness. Left breast exhibits no inverted nipple, no mass, no nipple discharge, no skin change and no tenderness. Abdominal: Soft. Normal appearance and bowel sounds are normal. She exhibits no mass. There is no abdominal tenderness. Genitourinary: Rectum:      Guaiac result negative. Musculoskeletal: Normal range of motion. General: No edema. Lymphadenopathy:     She has no cervical adenopathy. Neurological: She is alert and oriented to person, place, and time. Skin: Skin is warm and dry. Psychiatric: She has a normal mood and affect. Nursing note and vitals reviewed. ASSESSMENT and PLAN  Diagnoses and all orders for this visit:    1. Routine general medical examination at a health care facility  -     AMB POC URINALYSIS DIP STICK AUTO W/ MICRO  -     AMB POC EKG ROUTINE W/ 12 LEADS, INTER & REP    2. Essential hypertension  Discussed sodium restriction, high k rich diet, maintaining ideal body weight and regular exercise program such as daily walking 30 min perday 4-5 times per week, as physiologic means to achieve blood pressure control.  Medication compliance advised. 3. Type 2 diabetes with nephropathy (HCC)  a1c 6.5%  -     AMB POC HEMOGLOBIN A1C  -     AMB POC GLUCOSE, QUANTITATIVE, BLOOD  -     MICROALBUMIN, UR, RAND W/ MICROALB/CREAT RATIO    4. Mixed hyperlipidemia  -     LIPID PANEL    5. Encounter for medication monitoring  -     CBC W/O DIFF  -     METABOLIC PANEL, COMPREHENSIVE    6. Non morbid obesity  I have reviewed/discussed the above normal BMI with the patient. I have recommended the following interventions: dietary management education, guidance, and counseling . 7.  Encounter for screening fecal occult blood testing  -     AMB POC FECAL BLOOD, OCCULT, QL 1 CARD      Follow-up and Dispositions    · Return in about 6 months (around 8/4/2020). reviewed diet, exercise and weight control  cardiovascular risk and specific lipid/LDL goals reviewed  reviewed medications and side effects in detail  specific diabetic recommendations: low cholesterol diet, weight control and daily exercise discussed and glycohemoglobin and other lab monitoring discussed     I have discussed diagnosis listed in this note with pt and/or family. I have discussed treatment plans and options and the risk/benefit analysis of those options, including safe use of medications and possible medication side effects. Through the use of shared decision making we have agreed to the above plan. The patient has received an after-visit summary and questions were answered concerning future plans and follow up. Advise pt of any urgent changes then to proceed to the ER.

## 2020-02-04 NOTE — PROGRESS NOTES
This is a Subsequent Medicare Annual Wellness Exam (AWV) (Performed 12 months after IPPE or effective date of Medicare Part B enrollment)    I have reviewed the patient's medical history in detail and updated the computerized patient record. History     Patient Active Problem List   Diagnosis Code    Environmental allergies Z91.09    Hypovitaminosis D E55.9    Diabetes mellitus ([facility] E11.9    Sarcoidosis D86.9    Essential hypertension I10    Hyperlipidemia E78.5    Encounter for medication monitoring Z51.81    Type 2 diabetes with nephropathy (HCC) E11.21       Current Outpatient Medications   Medication Sig Dispense Refill    glipiZIDE SR (GLUCOTROL XL) 5 mg CR tablet TAKE 2 TABLETS EVERY DAY IN THE MORNING PRIOR TO BREAKFAST 180 Tab 0    pantoprazole (PROTONIX) 40 mg tablet TAKE 1 TABLET BY MOUTH EVERY DAY 90 Tab 1    JANUMET XR 50-1,000 mg TM24 TAKE 2 TABS BY MOUTH DAILY (WITH BREAKFAST). 180 Tab 3    atorvastatin (LIPITOR) 20 mg tablet TAKE 1 TABLET BY MOUTH EVERY DAY 90 Tab 3    olmesartan-hydroCHLOROthiazide (BENICAR HCT) 20-12.5 mg per tablet Take 1 Tab by mouth daily. 90 Tab 3    Insulin Needles, Disposable, (BD ULTRA-FINE SHORT PEN NEEDLE) 31 gauge x 5/16\" ndle Use to inject insulin daily 100 Pen Needle 11    glucose blood VI test strips (ASCENSIA CONTOUR) strip #100  As directed 100 Strip 11       Allergies   Allergen Reactions    Azithromycin Other (comments)     Hallucinations       Past Medical History:   Diagnosis Date    Diabetes ([facility] 2012    HTN (hypertension) 3/16/2010    Hypercholesterolemia     Sarcoidosis 1980s       Past Surgical History:   Procedure Laterality Date    ENDOSCOPY, COLON, DIAGNOSTIC  7/16/2007    repeat in 2010    HX ORTHOPAEDIC  09/2009    repair tear left knee    [place] COLONOSCOPY W/BIOPSY SINGLE/MULTIPLE  07/2010    repear in 5yr /dr. Jeff Johnson.        Family History   Problem Relation Age of Onset    Cancer Mother         lung    Stroke Mother    [facility] Diabetes Father     Hypertension Father     Hypertension Sister     Diabetes Sister     Diabetes Maternal Grandmother        Social History     Tobacco Use    Smoking status: Never Smoker    Smokeless tobacco: Never Used   Substance Use Topics    Alcohol use: No     Alcohol/week: 0.0 standard drinks         Depression Risk Factor Screening:     PHQ over the last two weeks 11/7/2017   Little interest or pleasure in doing things Not at all   Feeling down, depressed or hopeless Not at all   Total Score PHQ 2 0     Alcohol Risk Factor Screening: You do not drink alcohol or very rarely. Functional Ability and Level of Safety:   Hearing Loss  Hearing is good. Activities of Daily Living  The home contains: no safety equipment. Patient does total self care    Fall RiskFall Risk Assessment, last 12 mths 11/7/2017   Able to walk? Yes   Fall in past 12 months? No     Functional Ability:   Does the patient exhibit a steady gait? yes    How long did it take the patient to get up and walk from a sitting position? seconds    Is the patient self reliant? (ie can do own laundry, meals, household chores)  yes   Does the patient handle his/her own medications? yes   Does the patient handle his/her own money? yes   Is the patients home safe (ie good lighting, handrails on stairs and bath, etc.)? yes   Did you notice or did patient express any hearing difficulties? no   Did you notice or did patient express any vision difficulties? no        Advance Care Planning:   Patient was offered the opportunity to discuss advance care planning:  yes    Does patient have an Advance Directive:  no   If no, did you provide information on Caring Connections?   yes      Abuse Screen  Patient is not abused    Cognitive Screening   Evaluation of Cognitive Function:  Has your family/caregiver stated any concerns about your memory: no      Patient Care Team   Patient Care Team:  Pawel Zamora MD as PCP - General    Assessment/Plan   Education and counseling provided:  Are appropriate based on today's review and evaluation  End-of-Life planning (with patient's consent)    ASSESSMENT and PLAN    Medicare Annual Wellness  Continue current treatment plan. Continue annual follow up. I have discussed diagnosis listed in this note with pt and/or family. I have discussed treatment plans and options and the risk/benefit analysis of those options, including safe use of medications and possible medication side effects. Through the use of shared decision making we have agreed to the above plan. The patient has received an after-visit summary and questions were answered concerning future plans and follow up. Advise pt of any urgent changes then to proceed to the ER.

## 2020-02-04 NOTE — PROGRESS NOTES
Chief Complaint   Patient presents with    Complete Physical     A1C 5/29/2019    Microalbumin 2/8/2019    Mammogram 10/17/2019    Colonoscopy 11/10/2015 by Dr. Maria Del Carmen Mullen repeat in 5 years    Eye exam 6/4/2019 by Dr. Meghana Rojas. 1. Have you been to the ER, urgent care clinic since your last visit? Hospitalized since your last visit? No    2. Have you seen or consulted any other health care providers outside of the [address] [place] [address] since your last visit? Include any pap smears or colon screening.  No

## 2020-02-05 LAB
ALBUMIN SERPL-MCNC: 4.6 G/DL (ref 3.8–4.8)
ALBUMIN/GLOB SERPL: 1.7 {RATIO} (ref 1.2–2.2)
ALP SERPL-CCNC: 92 IU/L (ref 39–117)
ALT SERPL-CCNC: 13 IU/L (ref 0–32)
AST SERPL-CCNC: 15 IU/L (ref 0–40)
BILIRUB SERPL-MCNC: 0.7 MG/DL (ref 0–1.2)
BUN SERPL-MCNC: 15 MG/DL (ref 8–27)
BUN/CREAT SERPL: 14 (ref 12–28)
CALCIUM SERPL-MCNC: 10 MG/DL (ref 8.7–10.3)
CHLORIDE SERPL-SCNC: 100 MMOL/L (ref 96–106)
CHOLEST SERPL-MCNC: 169 MG/DL (ref 100–199)
CO2 SERPL-SCNC: 21 MMOL/L (ref 20–29)
CREAT SERPL-MCNC: 1.11 MG/DL (ref 0.57–1)
ERYTHROCYTE [DISTWIDTH] IN BLOOD BY AUTOMATED COUNT: 12.2 % (ref 11.7–15.4)
GLOBULIN SER CALC-MCNC: 2.7 G/DL (ref 1.5–4.5)
GLUCOSE SERPL-MCNC: 100 MG/DL (ref 65–99)
HCT VFR BLD AUTO: 34.4 % (ref 34–46.6)
HDLC SERPL-MCNC: 74 MG/DL
HGB BLD-MCNC: 11.7 G/DL (ref 11.1–15.9)
INTERPRETATION, 910389: NORMAL
INTERPRETATION: NORMAL
LDLC SERPL CALC-MCNC: 78 MG/DL (ref 0–99)
MCH RBC QN AUTO: 30.2 PG (ref 26.6–33)
MCHC RBC AUTO-ENTMCNC: 34 G/DL (ref 31.5–35.7)
MCV RBC AUTO: 89 FL (ref 79–97)
PDF IMAGE, 910387: NORMAL
PLATELET # BLD AUTO: 273 X10E3/UL (ref 150–450)
POTASSIUM SERPL-SCNC: 3.9 MMOL/L (ref 3.5–5.2)
PROT SERPL-MCNC: 7.3 G/DL (ref 6–8.5)
RBC # BLD AUTO: 3.88 X10E6/UL (ref 3.77–5.28)
SODIUM SERPL-SCNC: 142 MMOL/L (ref 134–144)
TRIGL SERPL-MCNC: 83 MG/DL (ref 0–149)
VLDLC SERPL CALC-MCNC: 17 MG/DL (ref 5–40)
WBC # BLD AUTO: 5.1 X10E3/UL (ref 3.4–10.8)

## 2020-03-31 DIAGNOSIS — I10 ESSENTIAL HYPERTENSION: ICD-10-CM

## 2020-03-31 RX ORDER — OLMESARTAN MEDOXOMIL AND HYDROCHLOROTHIAZIDE 20/12.5 20; 12.5 MG/1; MG/1
1 TABLET ORAL DAILY
Qty: 90 TAB | Refills: 3 | Status: SHIPPED | OUTPATIENT
Start: 2020-03-31 | End: 2021-03-16

## 2020-03-31 NOTE — TELEPHONE ENCOUNTER
Last visit:2/4/20  Next visit:8/4/20  Previous refill 4/15/19(90+3R)    Requested Prescriptions     Pending Prescriptions Disp Refills    olmesartan-hydroCHLOROthiazide (BENICAR HCT) 20-12.5 mg per tablet 90 Tab 3     Sig: Take 1 Tab by mouth daily.

## 2020-06-22 ENCOUNTER — TELEPHONE (OUTPATIENT)
Dept: FAMILY MEDICINE CLINIC | Age: 70
End: 2020-06-22

## 2020-06-22 DIAGNOSIS — Z00.00 ROUTINE ADULT HEALTH MAINTENANCE: Primary | ICD-10-CM

## 2020-06-22 NOTE — TELEPHONE ENCOUNTER
----- Message from Palomo Mulligan sent at 6/22/2020  9:34 AM EDT -----  Regarding: DR Keely Leo / Perri Ward Message/Vendor Calls    Pt is requesting to speak with nurse in regard getting a referral to a new facility for mammograms. Due to her previous provider being shut down.          Best contact number(s):  [phone]          Palomo Mulligan

## 2020-06-22 NOTE — TELEPHONE ENCOUNTER
Patient would like a mammogram done prior to October due to [facility] breast center being shut down. Per Dr. Gene Castano mammogram ordered. Advised patient to check with her insurance to make sure of coverage.

## 2020-07-02 ENCOUNTER — HOSPITAL ENCOUNTER (OUTPATIENT)
Dept: MAMMOGRAPHY | Age: 70
Discharge: HOME | End: 2020-07-02
Attending: FAMILY MEDICINE
Payer: COMMERCIAL

## 2020-07-02 DIAGNOSIS — Z00.00 ROUTINE ADULT HEALTH MAINTENANCE: ICD-10-CM

## 2020-07-02 PROCEDURE — 77067 SCR MAMMO BI INCL CAD: CPT

## 2020-08-04 ENCOUNTER — OFFICE VISIT (OUTPATIENT)
Dept: FAMILY MEDICINE CLINIC | Age: 70
End: 2020-08-04
Payer: COMMERCIAL

## 2020-08-04 VITALS
HEART RATE: 82 BPM | TEMPERATURE: 97.1 F | RESPIRATION RATE: 16 BRPM | SYSTOLIC BLOOD PRESSURE: 129 MMHG | BODY MASS INDEX: 24.83 KG/M2 | OXYGEN SATURATION: 98 % | WEIGHT: 149 LBS | HEIGHT: 65 IN | DIASTOLIC BLOOD PRESSURE: 68 MMHG

## 2020-08-04 DIAGNOSIS — I10 ESSENTIAL HYPERTENSION: Primary | ICD-10-CM

## 2020-08-04 DIAGNOSIS — E66.9 NON MORBID OBESITY: ICD-10-CM

## 2020-08-04 DIAGNOSIS — E11.21 TYPE 2 DIABETES WITH NEPHROPATHY (HCC): ICD-10-CM

## 2020-08-04 DIAGNOSIS — E78.2 MIXED HYPERLIPIDEMIA: ICD-10-CM

## 2020-08-04 DIAGNOSIS — R07.89 MUSCULAR CHEST PAIN: ICD-10-CM

## 2020-08-04 DIAGNOSIS — Z51.81 ENCOUNTER FOR MEDICATION MONITORING: ICD-10-CM

## 2020-08-04 LAB
GLUCOSE POC: 147 MG/DL
HBA1C MFR BLD HPLC: 6 %

## 2020-08-04 PROCEDURE — G8427 DOCREV CUR MEDS BY ELIG CLIN: HCPCS | Performed by: FAMILY MEDICINE

## 2020-08-04 PROCEDURE — 1101F PT FALLS ASSESS-DOCD LE1/YR: CPT | Performed by: FAMILY MEDICINE

## 2020-08-04 PROCEDURE — G8420 CALC BMI NORM PARAMETERS: HCPCS | Performed by: FAMILY MEDICINE

## 2020-08-04 PROCEDURE — 3017F COLORECTAL CA SCREEN DOC REV: CPT | Performed by: FAMILY MEDICINE

## 2020-08-04 PROCEDURE — 3044F HG A1C LEVEL LT 7.0%: CPT | Performed by: FAMILY MEDICINE

## 2020-08-04 PROCEDURE — G8752 SYS BP LESS 140: HCPCS | Performed by: FAMILY MEDICINE

## 2020-08-04 PROCEDURE — 2022F DILAT RTA XM EVC RTNOPTHY: CPT | Performed by: FAMILY MEDICINE

## 2020-08-04 PROCEDURE — 1090F PRES/ABSN URINE INCON ASSESS: CPT | Performed by: FAMILY MEDICINE

## 2020-08-04 PROCEDURE — 82947 ASSAY GLUCOSE BLOOD QUANT: CPT | Performed by: FAMILY MEDICINE

## 2020-08-04 PROCEDURE — G8754 DIAS BP LESS 90: HCPCS | Performed by: FAMILY MEDICINE

## 2020-08-04 PROCEDURE — G8399 PT W/DXA RESULTS DOCUMENT: HCPCS | Performed by: FAMILY MEDICINE

## 2020-08-04 PROCEDURE — G9899 SCRN MAM PERF RSLTS DOC: HCPCS | Performed by: FAMILY MEDICINE

## 2020-08-04 PROCEDURE — G8536 NO DOC ELDER MAL SCRN: HCPCS | Performed by: FAMILY MEDICINE

## 2020-08-04 PROCEDURE — 99214 OFFICE O/P EST MOD 30 MIN: CPT | Performed by: FAMILY MEDICINE

## 2020-08-04 PROCEDURE — G8510 SCR DEP NEG, NO PLAN REQD: HCPCS | Performed by: FAMILY MEDICINE

## 2020-08-04 RX ORDER — GLIPIZIDE 5 MG/1
TABLET, FILM COATED, EXTENDED RELEASE ORAL
Qty: 180 TAB | Refills: 3
Start: 2020-08-04 | End: 2021-03-05

## 2020-08-04 NOTE — PROGRESS NOTES
Chief Complaint   Patient presents with    Hypertension     follow up    Diabetes     follow up    Cholesterol Problem     follow up         Patient states she has had pain across both breast and radiates around to back. Patient reports she is concerned since she use to go to [facility] for over 30 years. A1C 2/4/2020    Mammogram 7/2/2020    Colonoscopy 11/10/2015 by Dr. Shonda Hartman repeat in 5 years    Eye exam 6/4/2019 by Dr. Nirmal Nwoak. 1. Have you been to the ER, urgent care clinic since your last visit? Hospitalized since your last visit? No    2. Have you seen or consulted any other health care providers outside of the [address] [place] [address] since your last visit? Include any pap smears or colon screening.  No

## 2020-08-04 NOTE — PROGRESS NOTES
HISTORY OF PRESENT ILLNESS  Greg Mcmillan is a 78 y.o. female. HPI   Follow up on chronic medical problems. Doing the precautionary measures at home to reduce risks of exposure COVID19. Also wearing mask when she is going out. No known sick contacts or known exposure to [address]. Has had some aching across the chest over the past few weeks. Pain is mostly when she moves or tries to lift. No injury noted. Pain comes and goes. No tightness or pressure sensation. No SOB. No palpitations. Increased pain when she raises her arms up. She has not taken anything for the pain. HTN follow up:  Compliant w/ meds, low salt diet, and exercise. Tolerating change to benicar HCT. No home bp monitoring. No swelling, headache or dizziness. No chest pain, SOB, palpitations. DM type II follow up:  Compliant w/ meds, diabetic diet, and exercise. Has been checking her BS over the past several months and averaging in the 140s. Last A1c level was 6.7% in 2/2019. Denies any tingling sensation. No polyuria and polydipsia. No blurred vision. No significant weight changes. Hypercholesterolemia follow up:  Compliant w/ low fat, low cholesterol diet. Has been taking the Lipitor nightly. Exercising some. No muscle nor no skin discoloration. Patient fasting today. Patient Active Problem List   Diagnosis Code    Environmental allergies Z91.09    Hypovitaminosis D E55.9    Diabetes mellitus ([facility] [address] E11.9    Sarcoidosis D86.9    Essential hypertension I10    Hyperlipidemia E78.5    Encounter for medication monitoring Z51.81    Type 2 diabetes with nephropathy (HCC) E11.21       Current Outpatient Medications   Medication Sig Dispense Refill    pantoprazole (PROTONIX) 40 mg tablet TAKE 1 TABLET BY MOUTH EVERY DAY 90 Tab 3    olmesartan-hydroCHLOROthiazide (BENICAR HCT) 20-12.5 mg per tablet Take 1 Tab by mouth daily.  90 Tab 3    glipiZIDE SR (GLUCOTROL XL) 5 mg CR tablet TAKE 2 TABLETS EVERY DAY IN THE MORNING PRIOR TO BREAKFAST 180 Tab 3    JANUMET XR 50-1,000 mg TM24 TAKE 2 TABS BY MOUTH DAILY (WITH BREAKFAST). 180 Tab 3    atorvastatin (LIPITOR) 20 mg tablet TAKE 1 TABLET BY MOUTH EVERY DAY 90 Tab 3    glucose blood VI test strips (ASCENSIA CONTOUR) strip #100  As directed 100 Strip 11       Allergies   Allergen Reactions    Azithromycin Other (comments)     Hallucinations       Past Medical History:   Diagnosis Date    Diabetes ([address] 2012    HTN (hypertension) 3/16/2010    Hypercholesterolemia     Sarcoidosis 1980s       Past Surgical History:   Procedure Laterality Date    ENDOSCOPY, COLON, DIAGNOSTIC  7/16/2007    repeat in 2010    HX ORTHOPAEDIC  09/2009    repair tear left knee    [place] COLONOSCOPY W/BIOPSY SINGLE/MULTIPLE  07/2010    repear in 5yr /dr. Chata Martin.        Family History   Problem Relation Age of Onset   Aundra Black Cancer Mother         lung    Stroke Mother     Diabetes Father     Hypertension Father     Hypertension Sister     Diabetes Sister     Diabetes Maternal Grandmother        Social History     Tobacco Use    Smoking status: Never Smoker    Smokeless tobacco: Never Used   Substance Use Topics    Alcohol use: No     Alcohol/week: 0.0 standard drinks            Lab Results   Component Value Date/Time    WBC 5.1 02/04/2020 01:50 AM    HGB 11.7 02/04/2020 01:50 AM    HCT 34.4 02/04/2020 01:50 AM    PLATELET 566 14/49/6944 01:50 AM    MCV 89 02/04/2020 01:50 AM     Lab Results   Component Value Date/Time    Cholesterol, total 169 02/04/2020 01:50 AM    HDL Cholesterol 74 02/04/2020 01:50 AM    LDL, calculated 78 02/04/2020 01:50 AM    Triglyceride 83 02/04/2020 01:50 AM    CHOL/HDL Ratio 3.8 04/02/2010 01:39 PM     Lab Results   Component Value Date/Time    TSH 0.836 03/06/2017 08:39 AM    T4, Free 1.20 02/14/2014 12:30 PM      Lab Results   Component Value Date/Time    Sodium 142 02/04/2020 01:50 AM    Potassium 3.9 02/04/2020 01:50 AM    Chloride 100 02/04/2020 01:50 AM CO2 21 02/04/2020 01:50 AM    Anion gap 8 04/02/2010 01:39 PM    Glucose 100 (H) 02/04/2020 01:50 AM    BUN 15 02/04/2020 01:50 AM    Creatinine 1.11 (H) 02/04/2020 01:50 AM    BUN/Creatinine ratio 14 02/04/2020 01:50 AM    GFR est AA 58 (L) 02/04/2020 01:50 AM    GFR est non-AA 50 (L) 02/04/2020 01:50 AM    Calcium 10.0 02/04/2020 01:50 AM    Bilirubin, total 0.7 02/04/2020 01:50 AM    ALT (SGPT) 13 02/04/2020 01:50 AM    Alk. phosphatase 92 02/04/2020 01:50 AM    Protein, total 7.3 02/04/2020 01:50 AM    Albumin 4.6 02/04/2020 01:50 AM    Globulin 3.6 04/02/2010 01:39 PM    A-G Ratio 1.7 02/04/2020 01:50 AM      Lab Results   Component Value Date/Time    Hemoglobin A1c 14.8 (H) 06/26/2018 10:08 AM    Hemoglobin A1c (POC) 6.5 02/04/2020 11:50 AM         Review of Systems   Constitutional: Negative for malaise/fatigue. HENT: Negative for congestion. Eyes: Negative for blurred vision. Respiratory: Negative for cough and shortness of breath. Cardiovascular: Negative for chest pain, palpitations and leg swelling. Gastrointestinal: Negative for abdominal pain, constipation and heartburn. Genitourinary: Negative for dysuria, frequency and urgency. Musculoskeletal: Negative for back pain and joint pain. Neurological: Negative for dizziness, tingling and headaches. Endo/Heme/Allergies: Negative for environmental allergies. Psychiatric/Behavioral: Negative for depression. The patient does not have insomnia. Physical Exam  Vitals signs and nursing note reviewed. Constitutional:       Appearance: Normal appearance. She is well-developed.       Comments: /68 (BP 1 Location: Left arm, BP Patient Position: Sitting)   Pulse 82   Temp 97.1 °F (36.2 °C) (Temporal)   Resp 16   Ht 5' 5\" (1.651 m)   Wt 149 lb (67.6 kg)   LMP 04/15/2002   SpO2 98%   BMI 24.79 kg/m²    HENT:      Right Ear: Tympanic membrane and ear canal normal.      Left Ear: Tympanic membrane and ear canal normal. Nose: No mucosal edema or rhinorrhea. Neck:      Musculoskeletal: Normal range of motion and neck supple. Thyroid: No thyromegaly. Cardiovascular:      Rate and Rhythm: Normal rate and regular rhythm. Heart sounds: Normal heart sounds. No gallop. Pulmonary:      Effort: Pulmonary effort is normal.      Breath sounds: Normal breath sounds. Chest:      Chest wall: Tenderness (mild tenderness across the anterior chest) present. Abdominal:      General: Bowel sounds are normal.      Palpations: Abdomen is soft. There is no mass. Tenderness: There is no abdominal tenderness. Musculoskeletal: Normal range of motion. General: No swelling. Lymphadenopathy:      Cervical: No cervical adenopathy. Skin:     General: Skin is warm and dry. Neurological:      General: No focal deficit present. Mental Status: She is alert and oriented to person, place, and time. Psychiatric:         Mood and Affect: Mood normal.         ASSESSMENT and PLAN  Diagnoses and all orders for this visit:    1. Essential hypertension  Stable     2. Type 2 diabetes with nephropathy (HCC)  -     AMB POC HEMOGLOBIN A1C  -     AMB POC GLUCOSE, QUANTITATIVE, BLOOD  -     glipiZIDE SR (GLUCOTROL XL) 5 mg CR tablet; TAKE 1 TABLET EVERY DAY IN THE MORNING PRIOR TO BREAKFAST    3. Mixed hyperlipidemia  -     LIPID PANEL    4. Encounter for medication monitoring  -     METABOLIC PANEL, COMPREHENSIVE  -     CBC W/O DIFF  -     COLLECTION VENOUS BLOOD,VENIPUNCTURE    5. Non morbid obesity  I have reviewed/discussed the above normal BMI with the patient. I have recommended the following interventions: dietary management education, guidance, and counseling . 6. Muscular chest pain  -     XR CHEST PA LAT; Future  Likely a muscle strain. Heat and aleve as needed. Follow up is sx persists. Follow-up and Dispositions    · Return in about 15 weeks (around 11/17/2020).        reviewed diet, exercise and weight control  cardiovascular risk and specific lipid/LDL goals reviewed  reviewed medications and side effects in detail  specific diabetic recommendations: low cholesterol diet, weight control and daily exercise discussed and glycohemoglobin and other lab monitoring discussed     I have discussed diagnosis listed in this note with pt and/or family. I have discussed treatment plans and options and the risk/benefit analysis of those options, including safe use of medications and possible medication side effects. Through the use of shared decision making we have agreed to the above plan. The patient has received an after-visit summary and questions were answered concerning future plans and follow up. Advise pt of any urgent changes then to proceed to the ER.

## 2020-08-05 LAB
ALBUMIN SERPL-MCNC: 4.6 G/DL (ref 3.8–4.8)
ALBUMIN/GLOB SERPL: 1.9 {RATIO} (ref 1.2–2.2)
ALP SERPL-CCNC: 79 IU/L (ref 39–117)
ALT SERPL-CCNC: 7 IU/L (ref 0–32)
AST SERPL-CCNC: 10 IU/L (ref 0–40)
BILIRUB SERPL-MCNC: 0.7 MG/DL (ref 0–1.2)
BUN SERPL-MCNC: 19 MG/DL (ref 8–27)
BUN/CREAT SERPL: 15 (ref 12–28)
CALCIUM SERPL-MCNC: 10.2 MG/DL (ref 8.7–10.3)
CHLORIDE SERPL-SCNC: 103 MMOL/L (ref 96–106)
CHOLEST SERPL-MCNC: 140 MG/DL (ref 100–199)
CO2 SERPL-SCNC: 23 MMOL/L (ref 20–29)
CREAT SERPL-MCNC: 1.26 MG/DL (ref 0.57–1)
ERYTHROCYTE [DISTWIDTH] IN BLOOD BY AUTOMATED COUNT: 12.8 % (ref 11.7–15.4)
GLOBULIN SER CALC-MCNC: 2.4 G/DL (ref 1.5–4.5)
GLUCOSE SERPL-MCNC: 140 MG/DL (ref 65–99)
HCT VFR BLD AUTO: 37.2 % (ref 34–46.6)
HDLC SERPL-MCNC: 66 MG/DL
HGB BLD-MCNC: 11.9 G/DL (ref 11.1–15.9)
INTERPRETATION, 910389: NORMAL
INTERPRETATION: NORMAL
LDLC SERPL CALC-MCNC: 56 MG/DL (ref 0–99)
MCH RBC QN AUTO: 29.1 PG (ref 26.6–33)
MCHC RBC AUTO-ENTMCNC: 32 G/DL (ref 31.5–35.7)
MCV RBC AUTO: 91 FL (ref 79–97)
PDF IMAGE, 910387: NORMAL
PLATELET # BLD AUTO: 304 X10E3/UL (ref 150–450)
POTASSIUM SERPL-SCNC: 5.2 MMOL/L (ref 3.5–5.2)
PROT SERPL-MCNC: 7 G/DL (ref 6–8.5)
RBC # BLD AUTO: 4.09 X10E6/UL (ref 3.77–5.28)
SODIUM SERPL-SCNC: 143 MMOL/L (ref 134–144)
TRIGL SERPL-MCNC: 88 MG/DL (ref 0–149)
VLDLC SERPL CALC-MCNC: 18 MG/DL (ref 5–40)
WBC # BLD AUTO: 4.4 X10E3/UL (ref 3.4–10.8)

## 2020-09-03 RX ORDER — ATORVASTATIN CALCIUM 20 MG/1
TABLET, FILM COATED ORAL
Qty: 90 TAB | Refills: 3 | Status: SHIPPED | OUTPATIENT
Start: 2020-09-03 | End: 2021-08-18

## 2020-09-03 NOTE — TELEPHONE ENCOUNTER
Last visit:8/4/20  Next visit:11/18/20  Previous refill 9/8/19(90+3R)    Requested Prescriptions     Pending Prescriptions Disp Refills    atorvastatin (LIPITOR) 20 mg tablet 90 Tab 3     Sig: TAKE 1 TABLET BY MOUTH EVERY DAY

## 2020-09-23 DIAGNOSIS — E11.21 TYPE II DIABETES MELLITUS WITH NEPHROPATHY (HCC): ICD-10-CM

## 2020-09-23 RX ORDER — SITAGLIPTIN AND METFORMIN HYDROCHLORIDE 50; 1000 MG/1; MG/1
TABLET, FILM COATED, EXTENDED RELEASE ORAL
Qty: 180 TAB | Refills: 3 | Status: SHIPPED | OUTPATIENT
Start: 2020-09-23 | End: 2021-09-27

## 2020-09-23 NOTE — TELEPHONE ENCOUNTER
Last visit:8/4/20  Next visit:11/18/20  Previous refill 9/30/19(180+3R)    Requested Prescriptions     Pending Prescriptions Disp Refills    SITagliptin-metFORMIN (Janumet XR) 50-1,000 mg TM24 180 Tab 3     Sig: TAKE 2 TABS BY MOUTH DAILY (WITH BREAKFAST).

## 2020-11-18 ENCOUNTER — OFFICE VISIT (OUTPATIENT)
Dept: FAMILY MEDICINE CLINIC | Age: 70
End: 2020-11-18
Payer: COMMERCIAL

## 2020-11-18 VITALS
RESPIRATION RATE: 18 BRPM | DIASTOLIC BLOOD PRESSURE: 71 MMHG | OXYGEN SATURATION: 99 % | BODY MASS INDEX: 24.86 KG/M2 | WEIGHT: 149.2 LBS | HEART RATE: 73 BPM | SYSTOLIC BLOOD PRESSURE: 128 MMHG | HEIGHT: 65 IN | TEMPERATURE: 98.6 F

## 2020-11-18 DIAGNOSIS — E11.21 TYPE 2 DIABETES WITH NEPHROPATHY (HCC): ICD-10-CM

## 2020-11-18 DIAGNOSIS — Z51.81 ENCOUNTER FOR MEDICATION MONITORING: ICD-10-CM

## 2020-11-18 DIAGNOSIS — I10 ESSENTIAL HYPERTENSION: Primary | ICD-10-CM

## 2020-11-18 DIAGNOSIS — E78.2 MIXED HYPERLIPIDEMIA: ICD-10-CM

## 2020-11-18 PROCEDURE — 99213 OFFICE O/P EST LOW 20 MIN: CPT | Performed by: FAMILY MEDICINE

## 2020-11-18 NOTE — PROGRESS NOTES
Chief Complaint   Patient presents with    Hypertension     follow up    Cholesterol Problem     follow up    Diabetes     follow up           A1C 8/4/2020    Mammogram 7/2/2020    Colonoscopy 11/10/2015 by Dr. Teodora Villegas repeat in 5 years. Patient states she has a colonoscopy scheduled 12/1/2020 with Dr. Ty Vela. Eye exam 6/4/2019 by Dr. Maru Weber. Patient reports she has an eye exam 12/8/2020    1. Have you been to the ER, urgent care clinic since your last visit? Hospitalized since your last visit? No    2. Have you seen or consulted any other health care providers outside of the [address] [place] [address] since your last visit? Include any pap smears or colon screening.  No

## 2020-11-18 NOTE — PROGRESS NOTES
HISTORY OF PRESENT ILLNESS  Raul Reyes is a 78 y.o. female. HPI   Follow up on chronic medical problems. Doing the precautionary measures at home to reduce risks of exposure COVID19. Also wearing mask when she is going out. No known sick contacts or known exposure to [place]. Overall feeling well. HTN follow up:  Compliant w/ meds, low salt diet, and exercise. Tolerating change to benicar HCT. No home bp monitoring. No swelling, headache or dizziness. No chest pain, SOB, palpitations. DM type II follow up:  Compliant w/ meds, diabetic diet, and exercise. Has not been checking her BS over the past several months. Denies any tingling sensation. No polyuria and polydipsia. No blurred vision. No significant weight changes. Hypercholesterolemia follow up:  Compliant w/ low fat, low cholesterol diet. Has been taking the Lipitor nightly. Exercising some. No muscle nor no skin discoloration. Patient fasting today. HM:  Mammogram 7/2/2020   Colonoscopy 11/10/2015 by Dr. Crystal Chavez repeat in 5 years. Patient states she has a colonoscopy scheduled 12/1/2020 with Dr. Fadi Carter exam 6/4/2019 by Dr. Julia Christina. Patient reports she has an eye exam 12/8/2020  Patient Active Problem List   Diagnosis Code    Environmental allergies Z91.09    Hypovitaminosis D E55.9    Diabetes mellitus ([facility] [address] E11.9    Sarcoidosis D86.9    Essential hypertension I10    Hyperlipidemia E78.5    Encounter for medication monitoring Z51.81    Type 2 diabetes with nephropathy (HCC) E11.21       Current Outpatient Medications   Medication Sig Dispense Refill    SITagliptin-metFORMIN (Janumet XR) 50-1,000 mg TM24 TAKE 2 TABS BY MOUTH DAILY (WITH BREAKFAST).  180 Tab 3    atorvastatin (LIPITOR) 20 mg tablet TAKE 1 TABLET BY MOUTH EVERY DAY 90 Tab 3    glipiZIDE SR (GLUCOTROL XL) 5 mg CR tablet TAKE 1 TABLET EVERY DAY IN THE MORNING PRIOR TO BREAKFAST 180 Tab 3    pantoprazole (PROTONIX) 40 mg tablet TAKE 1 TABLET BY MOUTH EVERY DAY 90 Tab 3    olmesartan-hydroCHLOROthiazide (BENICAR HCT) 20-12.5 mg per tablet Take 1 Tab by mouth daily. 90 Tab 3    glucose blood VI test strips (ASCENSIA CONTOUR) strip #100  As directed 100 Strip 11       Allergies   Allergen Reactions    Azithromycin Other (comments)     Hallucinations         Past Medical History:   Diagnosis Date    Diabetes ([address] 2012    HTN (hypertension) 3/16/2010    Hypercholesterolemia     Sarcoidosis 1980s         Past Surgical History:   Procedure Laterality Date    ENDOSCOPY, COLON, DIAGNOSTIC  7/16/2007    repeat in 2010    HX ORTHOPAEDIC  09/2009    repair tear left knee    [place] COLONOSCOPY W/BIOPSY SINGLE/MULTIPLE  07/2010    repear in 5yr /dr. Kimo Chang.          Family History   Problem Relation Age of Onset   [address] [facility] Cancer Mother         lung    Stroke Mother     Diabetes Father     Hypertension Father     Hypertension Sister     Diabetes Sister     Diabetes Maternal Grandmother        Social History     Tobacco Use    Smoking status: Never Smoker    Smokeless tobacco: Never Used   Substance Use Topics    Alcohol use: No     Alcohol/week: 0.0 standard drinks            Lab Results   Component Value Date/Time    WBC 4.4 08/04/2020 09:30 AM    HGB 11.9 08/04/2020 09:30 AM    HCT 37.2 08/04/2020 09:30 AM    PLATELET 433 01/74/8678 09:30 AM    MCV 91 08/04/2020 09:30 AM     Lab Results   Component Value Date/Time    Cholesterol, total 140 08/04/2020 09:30 AM    HDL Cholesterol 66 08/04/2020 09:30 AM    LDL, calculated 56 08/04/2020 09:30 AM    Triglyceride 88 08/04/2020 09:30 AM    CHOL/HDL Ratio 3.8 04/02/2010 01:39 PM     Lab Results   Component Value Date/Time    TSH 0.836 03/06/2017 08:39 AM    T4, Free 1.20 02/14/2014 12:30 PM      Lab Results   Component Value Date/Time    Sodium 143 08/04/2020 09:30 AM    Potassium 5.2 08/04/2020 09:30 AM    Chloride 103 08/04/2020 09:30 AM    CO2 23 08/04/2020 09:30 AM    Anion gap 8 04/02/2010 01:39 PM    Glucose 140 (H) 08/04/2020 09:30 AM    BUN 19 08/04/2020 09:30 AM    Creatinine 1.26 (H) 08/04/2020 09:30 AM    BUN/Creatinine ratio 15 08/04/2020 09:30 AM    GFR est AA 50 (L) 08/04/2020 09:30 AM    GFR est non-AA 43 (L) 08/04/2020 09:30 AM    Calcium 10.2 08/04/2020 09:30 AM    Bilirubin, total 0.7 08/04/2020 09:30 AM    ALT (SGPT) 7 08/04/2020 09:30 AM    Alk. phosphatase 79 08/04/2020 09:30 AM    Protein, total 7.0 08/04/2020 09:30 AM    Albumin 4.6 08/04/2020 09:30 AM    Globulin 3.6 04/02/2010 01:39 PM    A-G Ratio 1.9 08/04/2020 09:30 AM      Lab Results   Component Value Date/Time    Hemoglobin A1c 14.8 (H) 06/26/2018 10:08 AM    Hemoglobin A1c (POC) 6.0 08/04/2020 08:42 AM         Review of Systems   Constitutional: Negative for malaise/fatigue. HENT: Negative for congestion. Eyes: Negative for blurred vision. Respiratory: Negative for cough and shortness of breath. Cardiovascular: Negative for chest pain, palpitations and leg swelling. Gastrointestinal: Negative for abdominal pain, constipation and heartburn. Genitourinary: Negative for dysuria, frequency and urgency. Musculoskeletal: Negative for back pain and joint pain. Neurological: Negative for dizziness, tingling and headaches. Endo/Heme/Allergies: Negative for environmental allergies. Psychiatric/Behavioral: Negative for depression. The patient does not have insomnia. Physical Exam  Vitals signs and nursing note reviewed. Constitutional:       Appearance: Normal appearance. She is well-developed. Comments: /71 (BP 1 Location: Left arm, BP Patient Position: Sitting)   Pulse 73   Temp 98.6 °F (37 °C) (Temporal)   Resp 18   Ht 5' 5\" (1.651 m)   Wt 149 lb 3.2 oz (67.7 kg)   LMP 04/15/2002   SpO2 99%   BMI 24.83 kg/m²    HENT:      Right Ear: Tympanic membrane and ear canal normal.      Left Ear: Tympanic membrane and ear canal normal.      Nose: No mucosal edema or rhinorrhea.    Neck:      Musculoskeletal: Normal range of motion and neck supple. Thyroid: No thyromegaly. Cardiovascular:      Rate and Rhythm: Normal rate and regular rhythm. Heart sounds: Normal heart sounds. No gallop. Pulmonary:      Effort: Pulmonary effort is normal.      Breath sounds: Normal breath sounds. Abdominal:      General: Bowel sounds are normal.      Palpations: Abdomen is soft. There is no mass. Tenderness: There is no abdominal tenderness. Musculoskeletal: Normal range of motion. General: No swelling. Lymphadenopathy:      Cervical: No cervical adenopathy. Skin:     General: Skin is warm and dry. Neurological:      General: No focal deficit present. Mental Status: She is alert and oriented to person, place, and time. Psychiatric:         Mood and Affect: Mood normal.       ASSESSMENT and PLAN  Diagnoses and all orders for this visit:    1. Essential hypertension  Stable     2. Type 2 diabetes with nephropathy (HCC)  -     HEMOGLOBIN A1C WITH EAG; Future    3. Mixed hyperlipidemia  Continue to monitor. Work on diet and exercise. 4. Encounter for medication monitoring  -     METABOLIC PANEL, BASIC; Future      Follow-up and Dispositions    · Return in about 6 months (around 5/18/2021). current treatment plan is effective, no change in therapy  reviewed diet, exercise and weight control  cardiovascular risk and specific lipid/LDL goals reviewed  reviewed medications and side effects in detail  specific diabetic recommendations: low cholesterol diet, weight control and daily exercise discussed, home glucose monitoring emphasized, all medications, side effects and compliance discussed carefully, foot care discussed and Podiatry visits discussed, annual eye examinations at Ophthalmology discussed and glycohemoglobin and other lab monitoring discussed     I have discussed diagnosis listed in this note with pt and/or family.  I have discussed treatment plans and options and the risk/benefit analysis of those options, including safe use of medications and possible medication side effects. Through the use of shared decision making we have agreed to the above plan. The patient has received an after-visit summary and questions were answered concerning future plans and follow up. Advise pt of any urgent changes then to proceed to the ER.

## 2020-11-20 LAB
BUN SERPL-MCNC: 21 MG/DL (ref 8–27)
BUN/CREAT SERPL: 17 (ref 12–28)
CALCIUM SERPL-MCNC: 9.4 MG/DL (ref 8.7–10.3)
CHLORIDE SERPL-SCNC: 102 MMOL/L (ref 96–106)
CO2 SERPL-SCNC: 23 MMOL/L (ref 20–29)
CREAT SERPL-MCNC: 1.24 MG/DL (ref 0.57–1)
EST. AVERAGE GLUCOSE BLD GHB EST-MCNC: 134 MG/DL
GLUCOSE SERPL-MCNC: 273 MG/DL (ref 65–99)
HBA1C MFR BLD: 6.3 % (ref 4.8–5.6)
INTERPRETATION: NORMAL
Lab: NORMAL
POTASSIUM SERPL-SCNC: 4.6 MMOL/L (ref 3.5–5.2)
SODIUM SERPL-SCNC: 142 MMOL/L (ref 134–144)

## 2020-11-27 ENCOUNTER — HOSPITAL ENCOUNTER (OUTPATIENT)
Dept: LAB | Age: 70
Discharge: HOME | End: 2020-11-27
Payer: COMMERCIAL

## 2020-11-27 ENCOUNTER — TRANSCRIBE ORDER (OUTPATIENT)
Dept: EMERGENCY DEPT | Age: 70
End: 2020-11-27

## 2020-11-27 DIAGNOSIS — Z01.812 PRE-PROCEDURAL LABORATORY EXAMINATIONS: ICD-10-CM

## 2020-11-27 DIAGNOSIS — Z01.812 PRE-PROCEDURAL LABORATORY EXAMINATIONS: Primary | ICD-10-CM

## 2020-11-27 PROCEDURE — 87635 SARS-COV-2 COVID-19 AMP PRB: CPT

## 2020-11-28 LAB — SARS-COV-2, COV2NT: NOT DETECTED

## 2020-12-01 ENCOUNTER — HOSPITAL ENCOUNTER (OUTPATIENT)
Age: 70
Setting detail: OUTPATIENT SURGERY
Discharge: HOME | End: 2020-12-01
Attending: SPECIALIST | Admitting: SPECIALIST

## 2020-12-01 RX ORDER — NALOXONE HYDROCHLORIDE 0.4 MG/ML
0.4 INJECTION, SOLUTION INTRAMUSCULAR; INTRAVENOUS; SUBCUTANEOUS
Status: CANCELLED | OUTPATIENT
Start: 2020-12-01 | End: 2020-12-01

## 2020-12-01 RX ORDER — FENTANYL CITRATE 50 UG/ML
25 INJECTION, SOLUTION INTRAMUSCULAR; INTRAVENOUS AS NEEDED
Status: CANCELLED | OUTPATIENT
Start: 2020-12-01

## 2020-12-01 RX ORDER — SODIUM CHLORIDE 9 MG/ML
50 INJECTION, SOLUTION INTRAVENOUS CONTINUOUS
Status: CANCELLED | OUTPATIENT
Start: 2020-12-01 | End: 2020-12-01

## 2020-12-01 RX ORDER — DEXTROMETHORPHAN/PSEUDOEPHED 2.5-7.5/.8
1.2 DROPS ORAL
Status: CANCELLED | OUTPATIENT
Start: 2020-12-01

## 2020-12-01 RX ORDER — FLUMAZENIL 0.1 MG/ML
0.2 INJECTION INTRAVENOUS
Status: CANCELLED | OUTPATIENT
Start: 2020-12-01 | End: 2020-12-01

## 2020-12-01 RX ORDER — MIDAZOLAM HYDROCHLORIDE 1 MG/ML
.25-5 INJECTION, SOLUTION INTRAMUSCULAR; INTRAVENOUS AS NEEDED
Status: CANCELLED | OUTPATIENT
Start: 2020-12-01

## 2020-12-01 NOTE — PERIOP NOTES
Patient was unable to complete bowel prep. Colonoscopy will be rescheduled per Dr. Kyle Guerrier office.

## 2020-12-01 NOTE — H&P
.cdop  69 y.o. female for open access colonoscopy for screening   Additional data for completion of the targeted pre-endoscopy H&P will be provided under 'H&P interval notes'. Please see that document which will be attached to this.   Ashley Castaneda MD

## 2021-01-05 NOTE — PERIOP NOTES
Patient is aware of covid testing for Friday 1/8/21, between 8 am- 11 am    [address]  Endoscopy Preprocedure Instructions      1. On the day of your surgery, please report to registration located on the 2nd floor of the  [facility]. yes    2. You must have a responsible adult to drive you to the hospital, stay at the hospital during your procedure and drive you home. If they leave your procedure will not be started (no exceptions). yes    3. Do not have anything to eat or drink (including water, gum, mints, coffee, and juice) after midnight. This does not apply to the medications you were instructed to take by your physician. yesIf you are currently taking Plavix, Coumadin, Aspirin, or other blood-thinning agents, contact your physician for special instructions. not applicable,    4. If you are having a procedure that requires bowel prep: We recommend that you have only clear liquids the day before your procedure and begin your bowel prep by 5:00 pm.  You may continue to drink clear liquids until midnight. If for any reason you are not able to complete your prep please notify your physician immediately. yes    5. Have a list of all current medications, including vitamins, herbal supplements and any other over the counter medications. yes    6. If you wear glasses, contacts, dentures and/or hearing aids, they may be removed prior to procedure, please bring a case to store them in. yes    7. You should understand that if you do not follow these instructions your procedure may be cancelled. If your physical condition changes (I.e. fever, cold or flu) please contact your doctor as soon as possible. 8. It is important that you be on time.   If for any reason you are unable to keep your appointment please call [phone] the day of or your physicians office prior to your scheduled procedure

## 2021-01-08 ENCOUNTER — TRANSCRIBE ORDER (OUTPATIENT)
Dept: REGISTRATION | Age: 71
End: 2021-01-08

## 2021-01-08 ENCOUNTER — HOSPITAL ENCOUNTER (OUTPATIENT)
Dept: LAB | Age: 71
Discharge: HOME | End: 2021-01-08
Payer: COMMERCIAL

## 2021-01-08 DIAGNOSIS — Z01.812 PRE-PROCEDURAL LABORATORY EXAMINATIONS: Primary | ICD-10-CM

## 2021-01-08 DIAGNOSIS — Z01.812 PRE-PROCEDURAL LABORATORY EXAMINATIONS: ICD-10-CM

## 2021-01-08 PROCEDURE — 87635 SARS-COV-2 COVID-19 AMP PRB: CPT

## 2021-01-09 LAB — SARS-COV-2, COV2NT: NOT DETECTED

## 2021-01-12 ENCOUNTER — ANESTHESIA EVENT (OUTPATIENT)
Dept: ENDOSCOPY | Age: 71
End: 2021-01-12
Payer: COMMERCIAL

## 2021-01-12 ENCOUNTER — HOSPITAL ENCOUNTER (OUTPATIENT)
Age: 71
Setting detail: OUTPATIENT SURGERY
Discharge: HOME | End: 2021-01-12
Attending: SPECIALIST | Admitting: SPECIALIST
Payer: COMMERCIAL

## 2021-01-12 ENCOUNTER — ANESTHESIA (OUTPATIENT)
Dept: ENDOSCOPY | Age: 71
End: 2021-01-12
Payer: COMMERCIAL

## 2021-01-12 VITALS
SYSTOLIC BLOOD PRESSURE: 150 MMHG | HEART RATE: 81 BPM | WEIGHT: 150.79 LBS | BODY MASS INDEX: 28.47 KG/M2 | HEIGHT: 61 IN | DIASTOLIC BLOOD PRESSURE: 67 MMHG | TEMPERATURE: 97.6 F | RESPIRATION RATE: 22 BRPM | OXYGEN SATURATION: 98 %

## 2021-01-12 LAB
GLUCOSE BLD STRIP.AUTO-MCNC: 110 MG/DL (ref 65–100)
SERVICE CMNT-IMP: ABNORMAL

## 2021-01-12 PROCEDURE — 82962 GLUCOSE BLOOD TEST: CPT

## 2021-01-12 PROCEDURE — 77030013992 HC SNR POLYP ENDOSC BSC -B: Performed by: SPECIALIST

## 2021-01-12 PROCEDURE — 88305 TISSUE EXAM BY PATHOLOGIST: CPT

## 2021-01-12 PROCEDURE — 74011000250 HC RX REV CODE- 250: Performed by: NURSE ANESTHETIST, CERTIFIED REGISTERED

## 2021-01-12 PROCEDURE — 74011250636 HC RX REV CODE- 250/636: Performed by: SPECIALIST

## 2021-01-12 PROCEDURE — 74011250636 HC RX REV CODE- 250/636: Performed by: NURSE ANESTHETIST, CERTIFIED REGISTERED

## 2021-01-12 PROCEDURE — 76060000031 HC ANESTHESIA FIRST 0.5 HR: Performed by: SPECIALIST

## 2021-01-12 PROCEDURE — 76040000019: Performed by: SPECIALIST

## 2021-01-12 PROCEDURE — 2709999900 HC NON-CHARGEABLE SUPPLY: Performed by: SPECIALIST

## 2021-01-12 RX ORDER — FENTANYL CITRATE 50 UG/ML
25 INJECTION, SOLUTION INTRAMUSCULAR; INTRAVENOUS AS NEEDED
Status: DISCONTINUED | OUTPATIENT
Start: 2021-01-12 | End: 2021-01-12 | Stop reason: HOSPADM

## 2021-01-12 RX ORDER — LIDOCAINE HYDROCHLORIDE 20 MG/ML
INJECTION, SOLUTION EPIDURAL; INFILTRATION; INTRACAUDAL; PERINEURAL AS NEEDED
Status: DISCONTINUED | OUTPATIENT
Start: 2021-01-12 | End: 2021-01-12 | Stop reason: HOSPADM

## 2021-01-12 RX ORDER — FLUMAZENIL 0.1 MG/ML
0.2 INJECTION INTRAVENOUS
Status: DISCONTINUED | OUTPATIENT
Start: 2021-01-12 | End: 2021-01-12 | Stop reason: HOSPADM

## 2021-01-12 RX ORDER — SODIUM CHLORIDE 9 MG/ML
INJECTION, SOLUTION INTRAVENOUS
Status: DISCONTINUED | OUTPATIENT
Start: 2021-01-12 | End: 2021-01-12 | Stop reason: HOSPADM

## 2021-01-12 RX ORDER — PROPOFOL 10 MG/ML
INJECTION, EMULSION INTRAVENOUS
Status: DISCONTINUED | OUTPATIENT
Start: 2021-01-12 | End: 2021-01-12 | Stop reason: HOSPADM

## 2021-01-12 RX ORDER — MIDAZOLAM HYDROCHLORIDE 1 MG/ML
.25-5 INJECTION, SOLUTION INTRAMUSCULAR; INTRAVENOUS AS NEEDED
Status: DISCONTINUED | OUTPATIENT
Start: 2021-01-12 | End: 2021-01-12 | Stop reason: HOSPADM

## 2021-01-12 RX ORDER — SODIUM CHLORIDE 9 MG/ML
50 INJECTION, SOLUTION INTRAVENOUS CONTINUOUS
Status: DISCONTINUED | OUTPATIENT
Start: 2021-01-12 | End: 2021-01-12 | Stop reason: HOSPADM

## 2021-01-12 RX ORDER — NALOXONE HYDROCHLORIDE 0.4 MG/ML
0.4 INJECTION, SOLUTION INTRAMUSCULAR; INTRAVENOUS; SUBCUTANEOUS
Status: DISCONTINUED | OUTPATIENT
Start: 2021-01-12 | End: 2021-01-12 | Stop reason: HOSPADM

## 2021-01-12 RX ORDER — PROPOFOL 10 MG/ML
INJECTION, EMULSION INTRAVENOUS AS NEEDED
Status: DISCONTINUED | OUTPATIENT
Start: 2021-01-12 | End: 2021-01-12 | Stop reason: HOSPADM

## 2021-01-12 RX ORDER — DEXTROMETHORPHAN/PSEUDOEPHED 2.5-7.5/.8
1.2 DROPS ORAL
Status: DISCONTINUED | OUTPATIENT
Start: 2021-01-12 | End: 2021-01-12 | Stop reason: HOSPADM

## 2021-01-12 RX ADMIN — PROPOFOL 70 MG: 10 INJECTION, EMULSION INTRAVENOUS at 09:43

## 2021-01-12 RX ADMIN — SODIUM CHLORIDE 50 ML/HR: 9 INJECTION, SOLUTION INTRAVENOUS at 09:18

## 2021-01-12 RX ADMIN — SODIUM CHLORIDE: 9 INJECTION, SOLUTION INTRAVENOUS at 09:27

## 2021-01-12 RX ADMIN — LIDOCAINE HYDROCHLORIDE 40 MG: 20 INJECTION, SOLUTION INTRAVENOUS at 09:43

## 2021-01-12 RX ADMIN — PROPOFOL 140 MCG/KG/MIN: 10 INJECTION, EMULSION INTRAVENOUS at 09:43

## 2021-01-12 NOTE — ROUTINE PROCESS
Crystal Natarajan 1950 
[id number] Situation: 
Verbal report received from: [employer] Procedure: Procedure(s): 
COLONOSCOPY 
ENDOSCOPIC POLYPECTOMY Background: 
 
Preoperative diagnosis: PERSONAL HX COLON POLYPS Postoperative diagnosis: 1- Ascending colon polyp. :  Dr. Christa Hammer Assistant(s): Endoscopy Technician-1: Shamir Melgar Endoscopy RN-1: Mortimer Flowers Specimens:  
ID Type Source Tests Collected by Time Destination 1 : Ascending colon polyp  Preservative Colon, Ascending  Abdirashid Porter MD 1/12/2021 [address] H. Pylori  no Assessment: 
Intra-procedure medications Anesthesia gave intra-procedure sedation and medications, see anesthesia flow sheet yes Intravenous fluids: NS@ [employer] [facility] Vital signs stable Abdominal assessment: round and soft Recommendation: 
Discharge patient per MD order. Family- Permission to share finding with family or friend yes Endoscopy discharge instructions have been reviewed and given to patient. The patient verbalized understanding and acceptance of instructions. Dr. Christa Hammer discussed with spouse procedure findings and next steps.

## 2021-01-12 NOTE — H&P
69 y.o. female for open access colonoscopy for screening   Additional data for completion of the targeted pre-endoscopy H&P will be provided under 'H&P interval notes'. Please see that document which will be attached to this. Narciso Paige MD  Last colonoscopy 5 years ago, personal history of polyps. Need adult colonoscope for this patient.

## 2021-01-12 NOTE — PROCEDURES
[address] [facility] BAILEY Camejo MD  [phone]      2021    Colonoscopy Procedure Note  Neal Moore  :  1950  [facility] Medical Record Number: [id number]    Indications:     Screening colonoscopy  PCP:  Lucie Martins MD  Anesthesia/Sedation: Conscious Sedation/Moderate Sedation/GETA, see notes  Endoscopist:  Dr. Jose Roberto Finn  Complications:  None  Estimated Blood Loss:  None    Permit:  The indications, risks, benefits and alternatives were reviewed with the patient or their decision maker who was provided an opportunity to ask questions and all questions were answered. The specific risks of colonoscopy with conscious sedation were reviewed, including but not limited to anesthetic complication, bleeding, adverse drug reaction, missed lesion, infection, IV site reactions, and intestinal perforation which would lead to the need for surgical repair. Alternatives to colonoscopy including radiographic imaging, observation without testing, or laboratory testing were reviewed including the limitations of those alternatives. After considering the options and having all their questions answered, the patient or their decision maker provided both verbal and written consent to proceed. Procedure in Detail:  After obtaining informed consent, positioning of the patient in the left lateral decubitus position, and conduction of a pre-procedure pause or \"time out\" the endoscope was introduced into the anus and advanced to the cecum, which was identified by the ileocecal valve and appendiceal orifice. The quality of the colonic preparation was adequate. A careful inspection was made as the colonoscope was withdrawn, findings and interventions are described below. Findings:   5mm polyp in the ascending colon removed with cold snare and all samples retrieved.   Hemostasis confirmed. Specimens:    See above    Complications:   None; patient tolerated the procedure well. Impression:  Colon polyp    Recommendations:     - Await pathology. Thank you for entrusting me with this patient's care. Please do not hesitate to contact me with any questions or if I can be of assistance with any of your other patients' GI needs. Signed By: Maude Domínguez MD                        January 12, 2021      Surgical assistant none. Implants none unless specified.

## 2021-01-12 NOTE — ANESTHESIA PREPROCEDURE EVALUATION
Relevant Problems   No relevant active problems       Anesthetic History   No history of anesthetic complications            Review of Systems / Medical History  Patient summary reviewed, nursing notes reviewed and pertinent labs reviewed    Pulmonary              Pertinent negatives: No COPD, asthma and recent URI     Neuro/Psych   Within defined limits           Cardiovascular    Hypertension: well controlled          Hyperlipidemia         GI/Hepatic/Renal                Endo/Other    Diabetes: well controlled, type 2         Other Findings              Physical Exam    Airway  Mallampati: II  TM Distance: 4 - 6 cm  Neck ROM: normal range of motion   Mouth opening: Normal     Cardiovascular  Regular rate and rhythm,  S1 and S2 normal,  no murmur, click, rub, or gallop             Dental    Dentition: Lower dentition intact and Upper dentition intact     Pulmonary                 Abdominal         Other Findings            Anesthetic Plan    ASA: 2  Anesthesia type: MAC          Induction: Intravenous  Anesthetic plan and risks discussed with: Patient

## 2021-01-12 NOTE — INTERVAL H&P NOTE
Pre-Endoscopy H&P Update Chief complaint/HPI/ROS:  The indication for the procedure, the patient's history and the patient's current medications are reviewed prior to the procedure and that data is reported on the H&P to which this document is attached. Any significant complaints with regard to organ systems will be noted, and if not mentioned then a review of systems is not contributory. Past Medical History:  
Diagnosis Date Diabetes ([address] 2012 HTN (hypertension) 3/16/2010 Hypercholesterolemia Sarcoidosis 1980s Past Surgical History:  
Procedure Laterality Date ENDOSCOPY, COLON, DIAGNOSTIC  7/16/2007  
 repeat in 2010 HX ORTHOPAEDIC  09/2009  
 repair tear left knee [place] COLONOSCOPY W/BIOPSY SINGLE/MULTIPLE  07/2010  
 repear in 5yr /dr. Marcos Roberts. Social  
Social History Tobacco Use Smoking status: Never Smoker Smokeless tobacco: Never Used Substance Use Topics Alcohol use: No  
  Alcohol/week: 0.0 standard drinks Family History Problem Relation Age of Onset Cancer Mother   
     lung Stroke Mother Diabetes Father Hypertension Father Hypertension Sister Diabetes Sister Diabetes Maternal Grandmother Allergies Allergen Reactions Azithromycin Other (comments) Hallucinations Prior to Admission Medications Prescriptions Last Dose Informant Patient Reported? Taking? SITagliptin-metFORMIN (Janumet XR) 50-1,000 mg TM24 1/11/2021 at Unknown time  No Yes Sig: TAKE 2 TABS BY MOUTH DAILY (WITH BREAKFAST). atorvastatin (LIPITOR) 20 mg tablet 1/11/2021 at Unknown time  No Yes Sig: TAKE 1 TABLET BY MOUTH EVERY DAY  
glipiZIDE SR (GLUCOTROL XL) 5 mg CR tablet 1/11/2021 at Unknown time  No Yes Sig: TAKE 1 TABLET EVERY DAY IN THE MORNING PRIOR TO BREAKFAST  
glucose blood VI test strips (ASCENSIA CONTOUR) strip   No No  
Sig: #100 As directed olmesartan-hydroCHLOROthiazide (BENICAR HCT) 20-12.5 mg per tablet 1/11/2021 at Unknown time  No Yes Sig: Take 1 Tab by mouth daily. pantoprazole (PROTONIX) 40 mg tablet 1/11/2021 at Unknown time  No Yes Sig: TAKE 1 TABLET BY MOUTH EVERY DAY Facility-Administered Medications: None PHYSICAL EXAM:  The patient is examined immediately prior to the procedure. Visit Vitals BP (!) 160/78 Pulse 79 Temp 97.8 °F (36.6 °C) Resp 20 Ht 5' 1\" (1.549 m) Wt 68.4 kg (150 lb 12.7 oz) SpO2 98% Breastfeeding No  
BMI 28.49 kg/m² Gen: Appears comfortable, no distress. Pulm: comfortable respirations with no abnormal audible breath sounds HEART: well perfused, no abnormal audible heart sounds GI: abdomen flat. PLAN:  Informed consent discussion held, patient afforded an opportunity to ask questions and all questions answered. After being advised of the risks, benefits, and alternatives, the patient requested that we proceed and indicated so on a written consent form. Will proceed with procedure as planned.  
Karlene Landaverde MD

## 2021-01-12 NOTE — DISCHARGE INSTRUCTIONS
[facility]  [facility]  Dane Boland MD  [phone]      January 12, 2021    Rachael Berkowitz  YOB: 1950    COLONOSCOPY DISCHARGE INSTRUCTIONS    If there is redness at IV site you should apply warm compress to area.  If redness or soreness persist contact Dr. Boland' or your primary care doctor.    There may be a slight amount of blood passed from the rectum.  Gaseous discomfort may develop, but walking, belching will help relieve this.  You may not operate a vehicle for 12 hours  You may not operate machinery or dangerous appliances for rest of today  You may not drink alcoholic beverages for 12 hours  Avoid making any critical decisions for 24 hours    DIET:  You may resume your normal diet, but some patients find that heavy or large meals may lead to indigestion or vomiting.  I suggest a light meal as first food intake.    MEDICATIONS:  The use of some over-the-counter pain medication may lead to bleeding after colon biopsies or polyp removal.  Tylenol (also called acetaminophen) is safe to take even if you have had colonoscopy with polyp removal.  Based on the procedure you had today you may not safely take aspirin or aspirin-like products for the next ten (10) days.  Remember that Tylenol (also called acetaminophen) is safe to take after colonoscopy even if you have had biopsies or polyps removed.    ACTIVITY:  You may resume your normal household activities, but it is recommended that you spend the remainder of the day resting -  avoid any strenuous activity.    CALL DR. BOLAND' OFFICE IF:  Increasing pain, nausea, vomiting  Abdominal distension (swelling)  Significant new or increased bleeding (oral or rectal)  Fever/Chills  Chest pain/shortness of breath                       Additional instructions:   No aspirin 10 days.  We found one small polyp and removed that today.  I'll contact you with the polyp results  by letter in about a week. It was an honor to be your doctor today. Please let me or my office staff know if you have any feedback about today's procedure. Yaniv Wood MD    Colonoscopy saves lives, and can prevent colon cancer. Everyone aged 48 or older needs colonoscopy.   Tell your family and friends: get the test!

## 2021-01-12 NOTE — PERIOP NOTES
Received report from Frankie Wyman CRNA. See anesthesia record. Patient taken to post-recovery. Post-recovery report given to Kathy Medellin RN. Patient's ABD remains soft and non-tender post procedure. Pt has no complaints at this time and tolerated the procedure well. Endoscope was pre-cleaned at bedside immediately following procedure by Gypsy Wang

## 2021-01-13 NOTE — ANESTHESIA POSTPROCEDURE EVALUATION
Procedure(s):  COLONOSCOPY  ENDOSCOPIC POLYPECTOMY. MAC    Anesthesia Post Evaluation      Multimodal analgesia: multimodal analgesia used between 6 hours prior to anesthesia start to PACU discharge  Patient location during evaluation: PACU  Patient participation: complete - patient participated  Level of consciousness: awake and alert  Pain management: adequate  Airway patency: patent  Anesthetic complications: no  Cardiovascular status: acceptable  Respiratory status: acceptable  Hydration status: acceptable  Post anesthesia nausea and vomiting:  none      INITIAL Post-op Vital signs:   Vitals Value Taken Time   /67 01/12/21 1032   Temp 36.4 °C (97.6 °F) 01/12/21 1012   Pulse 85 01/12/21 1034   Resp 21 01/12/21 1034   SpO2 98 % 01/12/21 1034   Vitals shown include unvalidated device data.

## 2021-02-26 DIAGNOSIS — K21.9 GASTROESOPHAGEAL REFLUX DISEASE WITHOUT ESOPHAGITIS: ICD-10-CM

## 2021-02-26 RX ORDER — PANTOPRAZOLE SODIUM 40 MG/1
TABLET, DELAYED RELEASE ORAL
Qty: 90 TAB | Refills: 3 | Status: SHIPPED | OUTPATIENT
Start: 2021-02-26 | End: 2021-12-06

## 2021-03-05 DIAGNOSIS — E11.21 TYPE 2 DIABETES WITH NEPHROPATHY (HCC): ICD-10-CM

## 2021-03-05 RX ORDER — GLIPIZIDE 5 MG/1
TABLET, FILM COATED, EXTENDED RELEASE ORAL
Qty: 180 TAB | Refills: 3 | Status: SHIPPED | OUTPATIENT
Start: 2021-03-05 | End: 2021-05-19 | Stop reason: ALTCHOICE

## 2021-05-19 ENCOUNTER — OFFICE VISIT (OUTPATIENT)
Dept: FAMILY MEDICINE CLINIC | Age: 71
End: 2021-05-19
Payer: COMMERCIAL

## 2021-05-19 VITALS
RESPIRATION RATE: 16 BRPM | HEIGHT: 61 IN | HEART RATE: 74 BPM | SYSTOLIC BLOOD PRESSURE: 128 MMHG | OXYGEN SATURATION: 100 % | WEIGHT: 151.2 LBS | DIASTOLIC BLOOD PRESSURE: 74 MMHG | BODY MASS INDEX: 28.55 KG/M2 | TEMPERATURE: 98 F

## 2021-05-19 DIAGNOSIS — Z13.820 ENCOUNTER FOR SCREENING FOR OSTEOPOROSIS: ICD-10-CM

## 2021-05-19 DIAGNOSIS — E78.2 MIXED HYPERLIPIDEMIA: ICD-10-CM

## 2021-05-19 DIAGNOSIS — Z78.0 POSTMENOPAUSE: ICD-10-CM

## 2021-05-19 DIAGNOSIS — Z12.31 ENCOUNTER FOR SCREENING MAMMOGRAM FOR BREAST CANCER: ICD-10-CM

## 2021-05-19 DIAGNOSIS — I10 ESSENTIAL HYPERTENSION: Primary | ICD-10-CM

## 2021-05-19 DIAGNOSIS — Z51.81 ENCOUNTER FOR MEDICATION MONITORING: ICD-10-CM

## 2021-05-19 DIAGNOSIS — K21.9 GASTROESOPHAGEAL REFLUX DISEASE WITHOUT ESOPHAGITIS: ICD-10-CM

## 2021-05-19 DIAGNOSIS — E11.21 TYPE 2 DIABETES WITH NEPHROPATHY (HCC): ICD-10-CM

## 2021-05-19 LAB
ALBUMIN SERPL-MCNC: 3.9 G/DL (ref 3.5–5)
ALBUMIN/GLOB SERPL: 1.3 {RATIO} (ref 1.1–2.2)
ALP SERPL-CCNC: 80 U/L (ref 45–117)
ALT SERPL-CCNC: 15 U/L (ref 12–78)
ANION GAP SERPL CALC-SCNC: 5 MMOL/L (ref 5–15)
AST SERPL-CCNC: 10 U/L (ref 15–37)
BILIRUB SERPL-MCNC: 0.5 MG/DL (ref 0.2–1)
BILIRUB UR QL STRIP: NEGATIVE
BUN SERPL-MCNC: 21 MG/DL (ref 6–20)
BUN/CREAT SERPL: 18 (ref 12–20)
CALCIUM SERPL-MCNC: 9.7 MG/DL (ref 8.5–10.1)
CHLORIDE SERPL-SCNC: 108 MMOL/L (ref 97–108)
CHOLEST SERPL-MCNC: 159 MG/DL
CO2 SERPL-SCNC: 27 MMOL/L (ref 21–32)
CREAT SERPL-MCNC: 1.2 MG/DL (ref 0.55–1.02)
CREAT UR-MCNC: 141 MG/DL
ERYTHROCYTE [DISTWIDTH] IN BLOOD BY AUTOMATED COUNT: 13.8 % (ref 11.5–14.5)
EST. AVERAGE GLUCOSE BLD GHB EST-MCNC: 131 MG/DL
GLOBULIN SER CALC-MCNC: 3.1 G/DL (ref 2–4)
GLUCOSE SERPL-MCNC: 105 MG/DL (ref 65–100)
GLUCOSE UR-MCNC: NEGATIVE MG/DL
HBA1C MFR BLD: 6.2 % (ref 4–5.6)
HCT VFR BLD AUTO: 33.5 % (ref 35–47)
HDLC SERPL-MCNC: 84 MG/DL
HDLC SERPL: 1.9 {RATIO} (ref 0–5)
HGB BLD-MCNC: 10.6 G/DL (ref 11.5–16)
KETONES P FAST UR STRIP-MCNC: NEGATIVE MG/DL
LDLC SERPL CALC-MCNC: 63.6 MG/DL (ref 0–100)
MCH RBC QN AUTO: 29.8 PG (ref 26–34)
MCHC RBC AUTO-ENTMCNC: 31.6 G/DL (ref 30–36.5)
MCV RBC AUTO: 94.1 FL (ref 80–99)
MICROALBUMIN UR-MCNC: 0.84 MG/DL
MICROALBUMIN/CREAT UR-RTO: 6 MG/G (ref 0–30)
NRBC # BLD: 0 K/UL (ref 0–0.01)
NRBC BLD-RTO: 0 PER 100 WBC
PH UR STRIP: 5.5 [PH] (ref 4.6–8)
PLATELET # BLD AUTO: 339 K/UL (ref 150–400)
PMV BLD AUTO: 10.1 FL (ref 8.9–12.9)
POTASSIUM SERPL-SCNC: 5.2 MMOL/L (ref 3.5–5.1)
PROT SERPL-MCNC: 7 G/DL (ref 6.4–8.2)
PROT UR QL STRIP: NEGATIVE
RBC # BLD AUTO: 3.56 M/UL (ref 3.8–5.2)
SODIUM SERPL-SCNC: 140 MMOL/L (ref 136–145)
SP GR UR STRIP: 1.02 (ref 1–1.03)
TRIGL SERPL-MCNC: 57 MG/DL (ref ?–150)
UA UROBILINOGEN AMB POC: NORMAL (ref 0.2–1)
URINALYSIS CLARITY POC: CLEAR
URINALYSIS COLOR POC: YELLOW
URINE BLOOD POC: NEGATIVE
URINE LEUKOCYTES POC: NEGATIVE
URINE NITRITES POC: NEGATIVE
VLDLC SERPL CALC-MCNC: 11.4 MG/DL
WBC # BLD AUTO: 5.2 K/UL (ref 3.6–11)

## 2021-05-19 PROCEDURE — G8752 SYS BP LESS 140: HCPCS | Performed by: FAMILY MEDICINE

## 2021-05-19 PROCEDURE — 99214 OFFICE O/P EST MOD 30 MIN: CPT | Performed by: FAMILY MEDICINE

## 2021-05-19 PROCEDURE — 3017F COLORECTAL CA SCREEN DOC REV: CPT | Performed by: FAMILY MEDICINE

## 2021-05-19 PROCEDURE — G8427 DOCREV CUR MEDS BY ELIG CLIN: HCPCS | Performed by: FAMILY MEDICINE

## 2021-05-19 PROCEDURE — 1101F PT FALLS ASSESS-DOCD LE1/YR: CPT | Performed by: FAMILY MEDICINE

## 2021-05-19 PROCEDURE — 81003 URINALYSIS AUTO W/O SCOPE: CPT | Performed by: FAMILY MEDICINE

## 2021-05-19 PROCEDURE — G8536 NO DOC ELDER MAL SCRN: HCPCS | Performed by: FAMILY MEDICINE

## 2021-05-19 PROCEDURE — 2022F DILAT RTA XM EVC RTNOPTHY: CPT | Performed by: FAMILY MEDICINE

## 2021-05-19 PROCEDURE — G8399 PT W/DXA RESULTS DOCUMENT: HCPCS | Performed by: FAMILY MEDICINE

## 2021-05-19 PROCEDURE — 3046F HEMOGLOBIN A1C LEVEL >9.0%: CPT | Performed by: FAMILY MEDICINE

## 2021-05-19 PROCEDURE — G8510 SCR DEP NEG, NO PLAN REQD: HCPCS | Performed by: FAMILY MEDICINE

## 2021-05-19 PROCEDURE — G8419 CALC BMI OUT NRM PARAM NOF/U: HCPCS | Performed by: FAMILY MEDICINE

## 2021-05-19 PROCEDURE — 1090F PRES/ABSN URINE INCON ASSESS: CPT | Performed by: FAMILY MEDICINE

## 2021-05-19 PROCEDURE — G8754 DIAS BP LESS 90: HCPCS | Performed by: FAMILY MEDICINE

## 2021-05-19 PROCEDURE — G9899 SCRN MAM PERF RSLTS DOC: HCPCS | Performed by: FAMILY MEDICINE

## 2021-05-19 RX ORDER — GLIPIZIDE 5 MG/1
5 TABLET, FILM COATED, EXTENDED RELEASE ORAL DAILY
COMMUNITY

## 2021-05-19 NOTE — PROGRESS NOTES
Chief Complaint   Patient presents with    Hypertension     follow up    Cholesterol Problem     follow up    Diabetes     follow up           Mammogram 7/2/2020    Colonoscopy 1/12/2021 by Dr. Lyon repeat in 5 years. Eye exam 12/8/2020 by Dr. Nino Springer. 1. Have you been to the ER, urgent care clinic since your last visit? Hospitalized since your last visit? No    2. Have you seen or consulted any other health care providers outside of the [address] [place] [address] since your last visit? Include any pap smears or colon screening.  No

## 2021-05-19 NOTE — PROGRESS NOTES
HISTORY OF PRESENT ILLNESS  Tennille Cochran is a 69 y.o. female. HPI   Follow up on chronic medical problems. Doing the precautionary measures at home to reduce risks of exposure COVID19. Also wearing mask when she is going out. No known sick contacts or known exposure to [address]. Overall feeling well. HTN follow up:  Compliant w/ meds, low salt diet, and exercise. Tolerating change to benicar HCT. No home bp monitoring. No swelling, headache or dizziness. No chest pain, SOB, palpitations. DM type II follow up:  Compliant w/ meds, diabetic diet, and exercise. Has not been checking her BS over the past several months. Denies any tingling sensation. No polyuria and polydipsia. No blurred vision. No significant weight changes. Hypercholesterolemia follow up:  Compliant w/ low fat, low cholesterol diet. Has been taking the Lipitor nightly. Exercising some. No muscle nor no skin discoloration. Patient fasting today. HM:  Mammogram 7/2/2020   Colonoscopy 1/12/2021 by Dr. Lyon repeat in 5 years.    Eye exam 12/8/2020 by Dr. Nino Springer. Patient Active Problem List   Diagnosis Code    Environmental allergies Z91.09    Hypovitaminosis D E55.9    Diabetes mellitus ([facility] E11.9    Sarcoidosis D86.9    Essential hypertension I10    Hyperlipidemia E78.5    Encounter for medication monitoring Z51.81    Type 2 diabetes with nephropathy (HCC) E11.21       Current Outpatient Medications   Medication Sig Dispense Refill    olmesartan-hydroCHLOROthiazide (BENICAR HCT) 20-12.5 mg per tablet TAKE 1 TABLET BY MOUTH EVERY DAY 90 Tab 1    glipiZIDE SR (GLUCOTROL XL) 5 mg CR tablet TAKE 2 TABLETS EVERY DAY IN THE MORNING PRIOR TO BREAKFAST 180 Tab 3    pantoprazole (PROTONIX) 40 mg tablet TAKE 1 TABLET BY MOUTH EVERY DAY 90 Tab 3    SITagliptin-metFORMIN (Janumet XR) 50-1,000 mg TM24 TAKE 2 TABS BY MOUTH DAILY (WITH BREAKFAST).  180 Tab 3    atorvastatin (LIPITOR) 20 mg tablet TAKE 1 TABLET BY MOUTH EVERY DAY 90 Tab 3    glucose blood VI test strips (ASCENSIA CONTOUR) strip #100  As directed 100 Strip 11       Allergies   Allergen Reactions    Azithromycin Other (comments)     Hallucinations       Past Medical History:   Diagnosis Date    Diabetes ([address] 2012    HTN (hypertension) 3/16/2010    Hypercholesterolemia     Sarcoidosis 1980s       Past Surgical History:   Procedure Laterality Date    COLONOSCOPY N/A 1/12/2021    COLONOSCOPY performed by Jose Erazo MD at [address] [place] [address] [place], COLON, DIAGNOSTIC  7/16/2007    repeat in 2010    HX ORTHOPAEDIC  09/2009    repair tear left knee    [facility] [place] W/BIOPSY SINGLE/MULTIPLE  07/2010    repear in 5yr /dr. Carmen Castellanos.        Family History   Problem Relation Age of Onset   [place].Honey Cancer Mother         lung    Stroke Mother     Diabetes Father     Hypertension Father     Hypertension Sister     Diabetes Sister     Diabetes Maternal Grandmother        Social History     Tobacco Use    Smoking status: Never Smoker    Smokeless tobacco: Never Used   Substance Use Topics    Alcohol use: No     Alcohol/week: 0.0 standard drinks        Lab Results   Component Value Date/Time    WBC 4.4 08/04/2020 09:30 AM    HGB 11.9 08/04/2020 09:30 AM    HCT 37.2 08/04/2020 09:30 AM    PLATELET 010 11/54/9147 09:30 AM    MCV 91 08/04/2020 09:30 AM     Lab Results   Component Value Date/Time    Cholesterol, total 140 08/04/2020 09:30 AM    HDL Cholesterol 66 08/04/2020 09:30 AM    LDL, calculated 56 08/04/2020 09:30 AM    Triglyceride 88 08/04/2020 09:30 AM    CHOL/HDL Ratio 3.8 04/02/2010 01:39 PM     Lab Results   Component Value Date/Time    TSH 0.836 03/06/2017 08:39 AM    T4, Free 1.20 02/14/2014 12:30 PM      Lab Results   Component Value Date/Time    Sodium 142 11/19/2020 09:00 AM    Potassium 4.6 11/19/2020 09:00 AM    Chloride 102 11/19/2020 09:00 AM    CO2 23 11/19/2020 09:00 AM    Anion gap 8 04/02/2010 01:39 PM    Glucose 273 (H) 11/19/2020 09:00 AM    BUN 21 11/19/2020 09:00 AM    Creatinine 1.24 (H) 11/19/2020 09:00 AM    BUN/Creatinine ratio 17 11/19/2020 09:00 AM    GFR est AA 51 (L) 11/19/2020 09:00 AM    GFR est non-AA 44 (L) 11/19/2020 09:00 AM    Calcium 9.4 11/19/2020 09:00 AM    Bilirubin, total 0.7 08/04/2020 09:30 AM    ALT (SGPT) 7 08/04/2020 09:30 AM    Alk. phosphatase 79 08/04/2020 09:30 AM    Protein, total 7.0 08/04/2020 09:30 AM    Albumin 4.6 08/04/2020 09:30 AM    Globulin 3.6 04/02/2010 01:39 PM    A-G Ratio 1.9 08/04/2020 09:30 AM      Lab Results   Component Value Date/Time    Hemoglobin A1c 6.3 (H) 11/19/2020 09:00 AM    Hemoglobin A1c (POC) 6.0 08/04/2020 08:42 AM         Review of Systems   Constitutional: Negative for malaise/fatigue. HENT: Negative for congestion. Eyes: Negative for blurred vision. Respiratory: Negative for cough and shortness of breath. Cardiovascular: Negative for chest pain, palpitations and leg swelling. Gastrointestinal: Negative for abdominal pain, constipation and heartburn. Genitourinary: Negative for dysuria, frequency and urgency. Musculoskeletal: Negative for back pain and joint pain. Neurological: Negative for dizziness, tingling and headaches. Endo/Heme/Allergies: Negative for environmental allergies. Psychiatric/Behavioral: Negative for depression. The patient does not have insomnia. Physical Exam  Vitals and nursing note reviewed. Constitutional:       Appearance: Normal appearance. She is well-developed. Comments: /74 (BP 1 Location: Left arm, BP Patient Position: Sitting)   Pulse 74   Temp 98 °F (36.7 °C) (Oral)   Resp 16   Ht 5' 1\" (1.549 m)   Wt 151 lb 3.2 oz (68.6 kg)   LMP 04/15/2002   SpO2 100%   BMI 28.57 kg/m²    HENT:      Right Ear: Tympanic membrane and ear canal normal.      Left Ear: Tympanic membrane and ear canal normal.      Nose: No mucosal edema. Neck:      Thyroid: No thyromegaly.    Cardiovascular:      Rate and Rhythm: Normal rate and regular rhythm. Heart sounds: Normal heart sounds. No gallop. Pulmonary:      Effort: Pulmonary effort is normal.      Breath sounds: Normal breath sounds. Abdominal:      General: Bowel sounds are normal.      Palpations: Abdomen is soft. There is no mass. Tenderness: There is no abdominal tenderness. Musculoskeletal:         General: Normal range of motion. Cervical back: Normal range of motion and neck supple. Right lower leg: No edema. Left lower leg: No edema. Comments: Foot exam done . Normal sensation to PP, LT and vibration. Sensation intact to microfilament testing. Pulses intact. No swelling. No skin lesions or sores noted. No tinea present. Lymphadenopathy:      Cervical: No cervical adenopathy. Skin:     General: Skin is warm and dry. Neurological:      General: No focal deficit present. Mental Status: She is alert and oriented to person, place, and time. Psychiatric:         Mood and Affect: Mood normal.         ASSESSMENT and PLAN  Diagnoses and all orders for this visit:    1. Essential hypertension  Stable at goal.     2. Type 2 diabetes with nephropathy ([facility]  Continue to monitor. Work on diet and exercise.  -     HEMOGLOBIN A1C WITH EAG; Future  -     MICROALBUMIN, UR, RAND W/ MICROALB/CREAT RATIO; Future  -     AMB POC URINALYSIS DIP STICK AUTO W/O MICRO    3. Mixed hyperlipidemia  Continue to monitor. Work on diet and exercise.  -     LIPID PANEL; Future    4. Gastroesophageal reflux disease without esophagitis  Stable on protonix    5. Encounter for medication monitoring  -     METABOLIC PANEL, COMPREHENSIVE; Future  -     CBC W/O DIFF; Future    6. Encounter for screening mammogram for breast cancer  -     [facility] 3D ELI W MAMMO BI SCREENING INCL CAD; Future    7. Encounter for screening for osteoporosis  8. Postmenopause  -     DEXA BONE DENSITY STUDY AXIAL;  Future      Follow-up and Dispositions    · Return in about 6 months (around 11/19/2021) for medicare wellness exam.       reviewed diet, exercise and weight control  cardiovascular risk and specific lipid/LDL goals reviewed  reviewed medications and side effects in detail  specific diabetic recommendations: low cholesterol diet, weight control and daily exercise discussed, home glucose monitoring emphasized, all medications, side effects and compliance discussed carefully, foot care discussed and Podiatry visits discussed, annual eye examinations at Ophthalmology discussed and glycohemoglobin and other lab monitoring discussed

## 2021-07-07 ENCOUNTER — HOSPITAL ENCOUNTER (OUTPATIENT)
Dept: MAMMOGRAPHY | Age: 71
Discharge: HOME | End: 2021-07-07
Attending: FAMILY MEDICINE
Payer: COMMERCIAL

## 2021-07-07 DIAGNOSIS — Z12.31 ENCOUNTER FOR SCREENING MAMMOGRAM FOR BREAST CANCER: ICD-10-CM

## 2021-07-07 DIAGNOSIS — Z78.0 POSTMENOPAUSE: ICD-10-CM

## 2021-07-07 DIAGNOSIS — Z13.820 ENCOUNTER FOR SCREENING FOR OSTEOPOROSIS: ICD-10-CM

## 2021-07-07 PROCEDURE — 77080 DXA BONE DENSITY AXIAL: CPT

## 2021-07-07 PROCEDURE — 77063 BREAST TOMOSYNTHESIS BI: CPT

## 2021-08-18 RX ORDER — ATORVASTATIN CALCIUM 20 MG/1
TABLET, FILM COATED ORAL
Qty: 90 TABLET | Refills: 3 | Status: SHIPPED | OUTPATIENT
Start: 2021-08-18 | End: 2022-07-08

## 2021-09-27 DIAGNOSIS — E11.21 TYPE II DIABETES MELLITUS WITH NEPHROPATHY (HCC): ICD-10-CM

## 2021-09-27 RX ORDER — SITAGLIPTIN AND METFORMIN HYDROCHLORIDE 50; 1000 MG/1; MG/1
TABLET, FILM COATED, EXTENDED RELEASE ORAL
Qty: 180 TABLET | Refills: 3 | Status: SHIPPED | OUTPATIENT
Start: 2021-09-27 | End: 2022-07-08

## 2021-09-30 ENCOUNTER — TELEPHONE (OUTPATIENT)
Dept: FAMILY MEDICINE CLINIC | Age: 71
End: 2021-09-30

## 2021-09-30 DIAGNOSIS — E11.9 TYPE 2 DIABETES MELLITUS WITHOUT COMPLICATION, WITHOUT LONG-TERM CURRENT USE OF INSULIN (HCC): ICD-10-CM

## 2021-09-30 DIAGNOSIS — E11.21 TYPE II DIABETES MELLITUS WITH NEPHROPATHY (HCC): ICD-10-CM

## 2021-09-30 RX ORDER — GLIPIZIDE 5 MG/1
TABLET, FILM COATED, EXTENDED RELEASE ORAL
Status: CANCELLED | OUTPATIENT
Start: 2021-09-30

## 2021-09-30 RX ORDER — BLOOD SUGAR DIAGNOSTIC
STRIP MISCELLANEOUS
Qty: 100 STRIP | Refills: 11 | Status: CANCELLED | OUTPATIENT
Start: 2021-09-30

## 2021-09-30 RX ORDER — SITAGLIPTIN AND METFORMIN HYDROCHLORIDE 50; 1000 MG/1; MG/1
TABLET, FILM COATED, EXTENDED RELEASE ORAL
Qty: 180 TABLET | Refills: 3 | Status: CANCELLED | OUTPATIENT
Start: 2021-09-30

## 2021-09-30 NOTE — TELEPHONE ENCOUNTER
Pt left a voice message requesting a call back regarding the Glucotrol-XL 5 mg. Pt stated she believes that she has to much of the medication. Pt also requested a refill on Janumet. BCN: [phone]      Duplicate request: Janumet-XR 50-1,000 mg #180 with 3 refill was sent to [facility] on 09/27/2021.      Last visit 05/19/2021 MD Maday Chapa   Next appointment 11/23/2021 MD Graff       Requested Prescriptions     Pending Prescriptions Disp Refills    glucose blood VI test strips (Ascensia CONTOUR) strip 100 Strip 11     Sig: #100  As directed    SITagliptin-metFORMIN (Janumet XR) 50-1,000 mg TM24 180 Tablet 3

## 2021-09-30 NOTE — TELEPHONE ENCOUNTER
Dr. Strickland Fraction,            The pt would like a return call from a nurse or yourself regarding the Glucotrol-XL. The Janumet-XR 50-1,000mg prescription was just sent to the [facility] on 09/27/2021.  This was not a refill request.     BCN: [phone]

## 2021-10-01 NOTE — TELEPHONE ENCOUNTER
Spoke with patient and states she has refills on Glipizide. Patient states she has plenty since she is on auto refill. Informed patient may need to call pharm to be taken off auto refill.  Patient stated she would contact the pharm

## 2021-11-23 ENCOUNTER — OFFICE VISIT (OUTPATIENT)
Dept: FAMILY MEDICINE CLINIC | Age: 71
End: 2021-11-23
Payer: COMMERCIAL

## 2021-11-23 VITALS
SYSTOLIC BLOOD PRESSURE: 132 MMHG | HEIGHT: 61 IN | RESPIRATION RATE: 16 BRPM | WEIGHT: 150.2 LBS | DIASTOLIC BLOOD PRESSURE: 74 MMHG | HEART RATE: 81 BPM | BODY MASS INDEX: 28.36 KG/M2 | OXYGEN SATURATION: 99 % | TEMPERATURE: 97.8 F

## 2021-11-23 DIAGNOSIS — D64.9 CHRONIC ANEMIA: ICD-10-CM

## 2021-11-23 DIAGNOSIS — B37.31 VULVOVAGINITIS DUE TO YEAST: ICD-10-CM

## 2021-11-23 DIAGNOSIS — I10 ESSENTIAL HYPERTENSION: ICD-10-CM

## 2021-11-23 DIAGNOSIS — K21.9 GASTROESOPHAGEAL REFLUX DISEASE WITHOUT ESOPHAGITIS: ICD-10-CM

## 2021-11-23 DIAGNOSIS — Z51.81 ENCOUNTER FOR MEDICATION MONITORING: ICD-10-CM

## 2021-11-23 DIAGNOSIS — E11.21 TYPE 2 DIABETES WITH NEPHROPATHY (HCC): ICD-10-CM

## 2021-11-23 DIAGNOSIS — E78.2 MIXED HYPERLIPIDEMIA: ICD-10-CM

## 2021-11-23 DIAGNOSIS — Z00.00 ROUTINE GENERAL MEDICAL EXAMINATION AT A HEALTH CARE FACILITY: Primary | ICD-10-CM

## 2021-11-23 LAB
ALBUMIN SERPL-MCNC: 3.9 G/DL (ref 3.5–5)
ALBUMIN/GLOB SERPL: 1.3 {RATIO} (ref 1.1–2.2)
ALP SERPL-CCNC: 62 U/L (ref 45–117)
ALT SERPL-CCNC: 22 U/L (ref 12–78)
ANION GAP SERPL CALC-SCNC: 7 MMOL/L (ref 5–15)
APPEARANCE UR: CLEAR
AST SERPL-CCNC: 12 U/L (ref 15–37)
BACTERIA URNS QL MICRO: NEGATIVE /HPF
BILIRUB SERPL-MCNC: 0.6 MG/DL (ref 0.2–1)
BILIRUB UR QL: NEGATIVE
BUN SERPL-MCNC: 20 MG/DL (ref 6–20)
BUN/CREAT SERPL: 17 (ref 12–20)
CALCIUM SERPL-MCNC: 9.5 MG/DL (ref 8.5–10.1)
CHLORIDE SERPL-SCNC: 107 MMOL/L (ref 97–108)
CHOLEST SERPL-MCNC: 150 MG/DL
CO2 SERPL-SCNC: 23 MMOL/L (ref 21–32)
COLOR UR: NORMAL
CREAT SERPL-MCNC: 1.2 MG/DL (ref 0.55–1.02)
EPITH CASTS URNS QL MICRO: NORMAL /LPF
ERYTHROCYTE [DISTWIDTH] IN BLOOD BY AUTOMATED COUNT: 13.2 % (ref 11.5–14.5)
FERRITIN SERPL-MCNC: 106 NG/ML (ref 8–252)
GLOBULIN SER CALC-MCNC: 3.1 G/DL (ref 2–4)
GLUCOSE POC: 120 MG/DL
GLUCOSE SERPL-MCNC: 110 MG/DL (ref 65–100)
GLUCOSE UR STRIP.AUTO-MCNC: NEGATIVE MG/DL
HBA1C MFR BLD HPLC: 5.9 %
HCT VFR BLD AUTO: 33 % (ref 35–47)
HDLC SERPL-MCNC: 79 MG/DL
HDLC SERPL: 1.9 {RATIO} (ref 0–5)
HGB BLD-MCNC: 10.6 G/DL (ref 11.5–16)
HGB UR QL STRIP: NEGATIVE
HYALINE CASTS URNS QL MICRO: NORMAL /LPF (ref 0–5)
IRON SATN MFR SERPL: 20 % (ref 20–50)
IRON SERPL-MCNC: 58 UG/DL (ref 35–150)
KETONES UR QL STRIP.AUTO: NEGATIVE MG/DL
LDLC SERPL CALC-MCNC: 57.6 MG/DL (ref 0–100)
LEUKOCYTE ESTERASE UR QL STRIP.AUTO: NEGATIVE
MCH RBC QN AUTO: 29.9 PG (ref 26–34)
MCHC RBC AUTO-ENTMCNC: 32.1 G/DL (ref 30–36.5)
MCV RBC AUTO: 93 FL (ref 80–99)
NITRITE UR QL STRIP.AUTO: NEGATIVE
NRBC # BLD: 0 K/UL (ref 0–0.01)
NRBC BLD-RTO: 0 PER 100 WBC
PH UR STRIP: 5.5 [PH] (ref 5–8)
PLATELET # BLD AUTO: 313 K/UL (ref 150–400)
PMV BLD AUTO: 9.9 FL (ref 8.9–12.9)
POTASSIUM SERPL-SCNC: 4.3 MMOL/L (ref 3.5–5.1)
PROT SERPL-MCNC: 7 G/DL (ref 6.4–8.2)
PROT UR STRIP-MCNC: NEGATIVE MG/DL
RBC # BLD AUTO: 3.55 M/UL (ref 3.8–5.2)
RBC #/AREA URNS HPF: NORMAL /HPF (ref 0–5)
SODIUM SERPL-SCNC: 137 MMOL/L (ref 136–145)
SP GR UR REFRACTOMETRY: 1.02 (ref 1–1.03)
TIBC SERPL-MCNC: 286 UG/DL (ref 250–450)
TRIGL SERPL-MCNC: 67 MG/DL (ref ?–150)
UA: UC IF INDICATED,UAUC: NORMAL
UROBILINOGEN UR QL STRIP.AUTO: 0.2 EU/DL (ref 0.2–1)
VLDLC SERPL CALC-MCNC: 13.4 MG/DL
WBC # BLD AUTO: 4.8 K/UL (ref 3.6–11)
WBC URNS QL MICRO: NORMAL /HPF (ref 0–4)

## 2021-11-23 PROCEDURE — 3017F COLORECTAL CA SCREEN DOC REV: CPT | Performed by: FAMILY MEDICINE

## 2021-11-23 PROCEDURE — 99214 OFFICE O/P EST MOD 30 MIN: CPT | Performed by: FAMILY MEDICINE

## 2021-11-23 PROCEDURE — 1101F PT FALLS ASSESS-DOCD LE1/YR: CPT | Performed by: FAMILY MEDICINE

## 2021-11-23 PROCEDURE — 83036 HEMOGLOBIN GLYCOSYLATED A1C: CPT | Performed by: FAMILY MEDICINE

## 2021-11-23 PROCEDURE — G8399 PT W/DXA RESULTS DOCUMENT: HCPCS | Performed by: FAMILY MEDICINE

## 2021-11-23 PROCEDURE — G8752 SYS BP LESS 140: HCPCS | Performed by: FAMILY MEDICINE

## 2021-11-23 PROCEDURE — 82947 ASSAY GLUCOSE BLOOD QUANT: CPT | Performed by: FAMILY MEDICINE

## 2021-11-23 PROCEDURE — G0439 PPPS, SUBSEQ VISIT: HCPCS | Performed by: FAMILY MEDICINE

## 2021-11-23 PROCEDURE — G8536 NO DOC ELDER MAL SCRN: HCPCS | Performed by: FAMILY MEDICINE

## 2021-11-23 PROCEDURE — 3044F HG A1C LEVEL LT 7.0%: CPT | Performed by: FAMILY MEDICINE

## 2021-11-23 PROCEDURE — G8419 CALC BMI OUT NRM PARAM NOF/U: HCPCS | Performed by: FAMILY MEDICINE

## 2021-11-23 PROCEDURE — G8510 SCR DEP NEG, NO PLAN REQD: HCPCS | Performed by: FAMILY MEDICINE

## 2021-11-23 PROCEDURE — G8427 DOCREV CUR MEDS BY ELIG CLIN: HCPCS | Performed by: FAMILY MEDICINE

## 2021-11-23 PROCEDURE — 2022F DILAT RTA XM EVC RTNOPTHY: CPT | Performed by: FAMILY MEDICINE

## 2021-11-23 PROCEDURE — 1090F PRES/ABSN URINE INCON ASSESS: CPT | Performed by: FAMILY MEDICINE

## 2021-11-23 PROCEDURE — G9899 SCRN MAM PERF RSLTS DOC: HCPCS | Performed by: FAMILY MEDICINE

## 2021-11-23 PROCEDURE — G8754 DIAS BP LESS 90: HCPCS | Performed by: FAMILY MEDICINE

## 2021-11-23 RX ORDER — FLUCONAZOLE 150 MG/1
150 TABLET ORAL DAILY
Qty: 1 TABLET | Refills: 1 | Status: SHIPPED | OUTPATIENT
Start: 2021-11-23 | End: 2021-11-24

## 2021-11-23 NOTE — PROGRESS NOTES
Annual Wellness Visit               Mammogram 7/7/2021     Colonoscopy 1/12/2021 by Dr. Zenaida Brewer repeat in 5 years.      Eye exam 12/8/2020 by Dr. Mary Lou Carreon. Patient states she has an eye exam 12/21/2021.     1. Have you been to the ER, urgent care clinic since your last visit? Hospitalized since your last visit? No     2. Have you seen or consulted any other health care providers outside of the [address] [place] [address] since your last visit? Include any pap smears or colon screening.  No

## 2021-11-23 NOTE — PROGRESS NOTES
This is a Subsequent Medicare Annual Wellness Exam (AWV) (Performed 12 months after IPPE or effective date of Medicare Part B enrollment)    I have reviewed the patient's medical history in detail and updated the computerized patient record. History     Patient Active Problem List   Diagnosis Code    Environmental allergies Z91.09    Hypovitaminosis D E55.9    Diabetes mellitus ([facility] [address] E11.9    Sarcoidosis D86.9    Essential hypertension I10    Hyperlipidemia E78.5    Encounter for medication monitoring Z51.81    Type 2 diabetes with nephropathy (HCC) E11.21       Current Outpatient Medications   Medication Sig Dispense Refill    Janumet XR 50-1,000 mg TM24 TAKE 2 TABS BY MOUTH DAILY (WITH BREAKFAST). 180 Tablet 3    olmesartan-hydroCHLOROthiazide (BENICAR HCT) 20-12.5 mg per tablet TAKE 1 TABLET BY MOUTH EVERY DAY 90 Tablet 3    atorvastatin (LIPITOR) 20 mg tablet TAKE 1 TABLET BY MOUTH EVERY DAY 90 Tablet 3    glipiZIDE SR (GLUCOTROL XL) 5 mg CR tablet Take 5 mg by mouth daily.  OTHER Prevagen- take 1 tab daily      pantoprazole (PROTONIX) 40 mg tablet TAKE 1 TABLET BY MOUTH EVERY DAY 90 Tab 3    glucose blood VI test strips (ASCENSIA CONTOUR) strip #100  As directed 100 Strip 11       Allergies   Allergen Reactions    Azithromycin Other (comments)     Hallucinations       Past Medical History:   Diagnosis Date    Diabetes ([facility] [address] 2012    HTN (hypertension) 3/16/2010    Hypercholesterolemia     Sarcoidosis 1980s       Past Surgical History:   Procedure Laterality Date    COLONOSCOPY N/A 1/12/2021    COLONOSCOPY performed by Malina Flores MD at [facility], DIAGNOSTIC  7/16/2007    repeat in 2010    HX ORTHOPAEDIC  09/2009    repair tear left knee    [place] COLONOSCOPY W/BIOPSY SINGLE/MULTIPLE  07/2010    repear in 5yr /dr. Favian Bergeron.        Family History   Problem Relation Age of Onset    Cancer Mother         lung    Stroke Mother     Diabetes Father    Phyllis Marte Hypertension Father     Hypertension Sister     Diabetes Sister     Diabetes Maternal Grandmother        Social History     Tobacco Use    Smoking status: Never Smoker    Smokeless tobacco: Never Used   Substance Use Topics    Alcohol use: No     Alcohol/week: 0.0 standard drinks         Depression Risk Factor Screening:     PHQ over the last two weeks    Little interest or pleasure in doing things Not at all   Feeling down, depressed or hopeless Not at all   Total Score PHQ 2 0     Alcohol Risk Factor Screening: You do not drink alcohol or very rarely. Functional Ability and Level of Safety:   Hearing Loss  Hearing is good. Activities of Daily Living  The home contains: discussed safety equipment. Patient does total self care    Fall RiskFall Risk Assessment, last 12 mths    Able to walk? Yes   Fall in past 12 months? No     Functional Ability:   Does the patient exhibit a steady gait? yes    How long did it take the patient to get up and walk from a sitting position? seconds    Is the patient self reliant? (ie can do own laundry, meals, household chores)  yes   Does the patient handle his/her own medications? yes   Does the patient handle his/her own money? yes   Is the patients home safe (ie good lighting, handrails on stairs and bath, etc.)? yes   Did you notice or did patient express any hearing difficulties? no   Did you notice or did patient express any vision difficulties? no        Advance Care Planning:   Patient was offered the opportunity to discuss advance care planning:  yes    Does patient have an Advance Directive:  no   If no, did you provide information on Caring Connections?   yes      Abuse Screen  Patient is not abused    Cognitive Screening   Evaluation of Cognitive Function:  Has your family/caregiver stated any concerns about your memory: no      Patient Care Team   Patient Care Team:  Mone Mao MD as PCP - General    Assessment/Plan   Education and counseling provided:  Are appropriate based on today's review and evaluation  End-of-Life planning (with patient's consent)    ASSESSMENT and PLAN    Medicare Annual Wellness  Continue current treatment plan. Continue annual follow up. I have discussed diagnosis listed in this note with pt and/or family. I have discussed treatment plans and options and the risk/benefit analysis of those options, including safe use of medications and possible medication side effects. Through the use of shared decision making we have agreed to the above plan. The patient has received an after-visit summary and questions were answered concerning future plans and follow up. Advise pt of any urgent changes then to proceed to the ER.

## 2021-11-23 NOTE — PROGRESS NOTES
HISTORY OF PRESENT ILLNESS  Venkata Rodriguez is a 69 y.o. female. HPI   Follow up on chronic medical problems. Doing the precautionary measures at home to reduce risks of exposure COVID19. Also wearing mask when she is going out. No known sick contacts or known exposure to [address]. Overall feeling well. HTN follow up:  Compliant w/ meds, low salt diet, and exercise. Tolerating change to benicar HCT. No home bp monitoring. No swelling, headache or dizziness. No chest pain, SOB, palpitations. DM type II follow up:  Compliant w/ meds, diabetic diet, and exercise. Has not been checking her BS over the past several months. Denies any tingling sensation. No polyuria and polydipsia. No blurred vision. No significant weight changes. Hypercholesterolemia follow up:  Compliant w/ low fat, low cholesterol diet. Has been taking the Lipitor nightly. Exercising some. No muscle nor no skin discoloration. Patient fasting today. HM:  Mammogram 7/7/2021   Colonoscopy 1/12/2021 by Dr. Jonnathan Hair repeat in 5 years.    Eye exam 12/8/2020 by Dr. Adriane Galeazzi. Patient states she has an eye exam 12/21/2021. Patient Active Problem List   Diagnosis Code    Environmental allergies Z91.09    Hypovitaminosis D E55.9    Diabetes mellitus ([facility] [address] E11.9    Sarcoidosis D86.9    Essential hypertension I10    Hyperlipidemia E78.5    Encounter for medication monitoring Z51.81    Type 2 diabetes with nephropathy (HCC) E11.21       Current Outpatient Medications   Medication Sig Dispense Refill    Janumet XR 50-1,000 mg TM24 TAKE 2 TABS BY MOUTH DAILY (WITH BREAKFAST). 180 Tablet 3    olmesartan-hydroCHLOROthiazide (BENICAR HCT) 20-12.5 mg per tablet TAKE 1 TABLET BY MOUTH EVERY DAY 90 Tablet 3    atorvastatin (LIPITOR) 20 mg tablet TAKE 1 TABLET BY MOUTH EVERY DAY 90 Tablet 3    glipiZIDE SR (GLUCOTROL XL) 5 mg CR tablet Take 5 mg by mouth daily.       OTHER Prevagen- take 1 tab daily      pantoprazole (PROTONIX) 40 mg tablet TAKE 1 TABLET BY MOUTH EVERY DAY 90 Tab 3    glucose blood VI test strips (ASCENSIA CONTOUR) strip #100  As directed 100 Strip 11       Allergies   Allergen Reactions    Azithromycin Other (comments)     Hallucinations         Past Medical History:   Diagnosis Date    Diabetes ([address] 2012    HTN (hypertension) 3/16/2010    Hypercholesterolemia     Sarcoidosis 1980s         Past Surgical History:   Procedure Laterality Date    COLONOSCOPY N/A 1/12/2021    COLONOSCOPY performed by Ezra Flynn MD at [facility]. [address], COLON, DIAGNOSTIC  7/16/2007    repeat in 2010    HX ORTHOPAEDIC  09/2009    repair tear left knee    OR COLONOSCOPY W/BIOPSY SINGLE/MULTIPLE  07/2010    repear in 5yr /dr. Kasia Raza.          Family History   Problem Relation Age of Onset   [employer] Cancer Mother         lung    Stroke Mother     Diabetes Father     Hypertension Father     Hypertension Sister     Diabetes Sister     Diabetes Maternal Grandmother        Social History     Tobacco Use    Smoking status: Never Smoker    Smokeless tobacco: Never Used   Substance Use Topics    Alcohol use: No     Alcohol/week: 0.0 standard drinks        Lab Results   Component Value Date/Time    WBC 5.2 05/19/2021 09:15 AM    HGB 10.6 (L) 05/19/2021 09:15 AM    HCT 33.5 (L) 05/19/2021 09:15 AM    PLATELET 019 21/33/2098 09:15 AM    MCV 94.1 05/19/2021 09:15 AM     Lab Results   Component Value Date/Time    Cholesterol, total 159 05/19/2021 09:15 AM    HDL Cholesterol 84 05/19/2021 09:15 AM    LDL, calculated 63.6 05/19/2021 09:15 AM    Triglyceride 57 05/19/2021 09:15 AM    CHOL/HDL Ratio 1.9 05/19/2021 09:15 AM     Lab Results   Component Value Date/Time    TSH 0.836 03/06/2017 08:39 AM    T4, Free 1.20 02/14/2014 12:30 PM      Lab Results   Component Value Date/Time    Sodium 140 05/19/2021 09:15 AM    Potassium 5.2 (H) 05/19/2021 09:15 AM    Chloride 108 05/19/2021 09:15 AM    CO2 27 05/19/2021 09:15 AM    Anion gap 5 05/19/2021 09:15 AM    Glucose 105 (H) 05/19/2021 09:15 AM    BUN 21 (H) 05/19/2021 09:15 AM    Creatinine 1.20 (H) 05/19/2021 09:15 AM    BUN/Creatinine ratio 18 05/19/2021 09:15 AM    GFR est AA 54 (L) 05/19/2021 09:15 AM    GFR est non-AA 44 (L) 05/19/2021 09:15 AM    Calcium 9.7 05/19/2021 09:15 AM    Bilirubin, total 0.5 05/19/2021 09:15 AM    ALT (SGPT) 15 05/19/2021 09:15 AM    Alk. phosphatase 80 05/19/2021 09:15 AM    Protein, total 7.0 05/19/2021 09:15 AM    Albumin 3.9 05/19/2021 09:15 AM    Globulin 3.1 05/19/2021 09:15 AM    A-G Ratio 1.3 05/19/2021 09:15 AM      Lab Results   Component Value Date/Time    Hemoglobin A1c 6.2 (H) 05/19/2021 09:15 AM    Hemoglobin A1c (POC) 6.0 08/04/2020 08:42 AM         Review of Systems   Constitutional: Negative for malaise/fatigue. HENT: Negative for congestion. Eyes: Negative for blurred vision. Respiratory: Negative for cough and shortness of breath. Cardiovascular: Negative for chest pain, palpitations and leg swelling. Gastrointestinal: Negative for abdominal pain, constipation and heartburn. Genitourinary: Negative for dysuria, frequency and urgency. Thinks that she has yeast infection with vaginal itching over the past several days. Musculoskeletal: Negative for back pain and joint pain. Neurological: Negative for dizziness, tingling and headaches. Endo/Heme/Allergies: Negative for environmental allergies. Psychiatric/Behavioral: Negative for depression. The patient does not have insomnia. Physical Exam  Vitals and nursing note reviewed. Constitutional:       Appearance: Normal appearance. She is well-developed.       Comments: /74 (BP 1 Location: Left arm, BP Patient Position: Sitting)   Pulse 81   Temp 97.8 °F (36.6 °C) (Oral)   Resp 16   Ht 5' 0.5\" (1.537 m)   Wt 150 lb 3.2 oz (68.1 kg)   LMP 04/15/2002   SpO2 99%   BMI 28.85 kg/m²      HENT:      Right Ear: Tympanic membrane and ear canal normal.      Left Ear: Tympanic membrane and ear canal normal.      Nose: No mucosal edema. Neck:      Thyroid: No thyromegaly. Vascular: No carotid bruit. Cardiovascular:      Rate and Rhythm: Normal rate and regular rhythm. Pulses: Normal pulses. Heart sounds: Normal heart sounds. No gallop. Pulmonary:      Effort: Pulmonary effort is normal.      Breath sounds: Normal breath sounds. Chest:   Breasts:      Right: No axillary adenopathy or supraclavicular adenopathy. Left: No axillary adenopathy or supraclavicular adenopathy. Abdominal:      General: Bowel sounds are normal.      Palpations: Abdomen is soft. There is no mass. Tenderness: There is no abdominal tenderness. Musculoskeletal:         General: No swelling. Normal range of motion. Cervical back: Normal range of motion and neck supple. Right lower leg: No edema. Left lower leg: No edema. Comments: Foot exam done . Normal sensation to PP, LT and vibration. Sensation intact to microfilament testing. Pulses intact. No swelling. No skin lesions or sores noted. No tinea present. Lymphadenopathy:      Cervical: No cervical adenopathy. Upper Body:      Right upper body: No supraclavicular, axillary or pectoral adenopathy. Left upper body: No supraclavicular, axillary or pectoral adenopathy. Skin:     General: Skin is warm and dry. Neurological:      General: No focal deficit present. Mental Status: She is alert and oriented to person, place, and time. Psychiatric:         Mood and Affect: Mood normal.         ASSESSMENT and PLAN  Diagnoses and all orders for this visit:    1. Routine general medical examination at a health care facility    2. Essential hypertension  Stable     3. Type 2 diabetes with nephropathy (HCC)  a1c stable at 5.9%  -     AMB POC HEMOGLOBIN A1C  -     AMB POC GLUCOSE, QUANTITATIVE, BLOOD    4. Mixed hyperlipidemia  -     LIPID PANEL; Future    5.  Chronic anemia  - IRON PROFILE; Future  -     FERRITIN; Future  -     CBC W/O DIFF; Future    6. Gastroesophageal reflux disease without esophagitis  Stable on protonix    7. Vulvovaginitis due to yeast  -     fluconazole (DIFLUCAN) 150 mg tablet; Take 1 Tablet by mouth daily for 1 day. 8. Encounter for medication monitoring  -     URINALYSIS W/ REFLEX CULTURE; Future  -     METABOLIC PANEL, COMPREHENSIVE; Future      Follow-up and Dispositions    · Return in about 6 months (around 5/23/2022). reviewed diet, exercise and weight control  cardiovascular risk and specific lipid/LDL goals reviewed  reviewed medications and side effects in detail  specific diabetic recommendations: low cholesterol diet, weight control and daily exercise discussed, home glucose monitoring emphasized, all medications, side effects and compliance discussed carefully, foot care discussed and Podiatry visits discussed, annual eye examinations at Ophthalmology discussed and glycohemoglobin and other lab monitoring discussed     I have discussed diagnosis listed in this note with pt and/or family. I have discussed treatment plans and options and the risk/benefit analysis of those options, including safe use of medications and possible medication side effects. Through the use of shared decision making we have agreed to the above plan. The patient has received an after-visit summary and questions were answered concerning future plans and follow up. Advise pt of any urgent changes then to proceed to the ER.

## 2021-11-23 NOTE — PROGRESS NOTES
Chief Complaint   Patient presents with    Annual Wellness Visit           Mammogram 7/7/2021    Colonoscopy 1/12/2021 by Dr. Suero Quiet repeat in 5 years. Eye exam 12/8/2020 by Dr. Mari Contreras. Patient states she has an eye exam 12/21/2021.    1. Have you been to the ER, urgent care clinic since your last visit? Hospitalized since your last visit? No    2. Have you seen or consulted any other health care providers outside of the [address] [place] [address] since your last visit? Include any pap smears or colon screening.  No

## 2021-11-23 NOTE — PATIENT INSTRUCTIONS

## 2021-12-06 DIAGNOSIS — K21.9 GASTROESOPHAGEAL REFLUX DISEASE WITHOUT ESOPHAGITIS: ICD-10-CM

## 2021-12-06 RX ORDER — PANTOPRAZOLE SODIUM 40 MG/1
TABLET, DELAYED RELEASE ORAL
Qty: 90 TABLET | Refills: 3 | Status: SHIPPED | OUTPATIENT
Start: 2021-12-06

## 2021-12-20 ENCOUNTER — OFFICE VISIT (OUTPATIENT)
Dept: FAMILY MEDICINE CLINIC | Age: 71
End: 2021-12-20
Payer: COMMERCIAL

## 2021-12-20 VITALS
RESPIRATION RATE: 16 BRPM | HEIGHT: 61 IN | HEART RATE: 79 BPM | DIASTOLIC BLOOD PRESSURE: 80 MMHG | TEMPERATURE: 97.1 F | BODY MASS INDEX: 28.21 KG/M2 | WEIGHT: 149.4 LBS | SYSTOLIC BLOOD PRESSURE: 138 MMHG | OXYGEN SATURATION: 100 %

## 2021-12-20 DIAGNOSIS — B37.31 CANDIDAL VULVITIS: Primary | ICD-10-CM

## 2021-12-20 DIAGNOSIS — N95.2 POST-MENOPAUSAL ATROPHIC VAGINITIS: ICD-10-CM

## 2021-12-20 LAB
APPEARANCE UR: CLEAR
BACTERIA URNS QL MICRO: NEGATIVE /HPF
BILIRUB UR QL: NEGATIVE
COLOR UR: ABNORMAL
EPITH CASTS URNS QL MICRO: ABNORMAL /LPF
GLUCOSE UR STRIP.AUTO-MCNC: 100 MG/DL
HGB UR QL STRIP: NEGATIVE
HYALINE CASTS URNS QL MICRO: ABNORMAL /LPF (ref 0–5)
KETONES UR QL STRIP.AUTO: NEGATIVE MG/DL
LEUKOCYTE ESTERASE UR QL STRIP.AUTO: NEGATIVE
NITRITE UR QL STRIP.AUTO: NEGATIVE
PH UR STRIP: 5 [PH] (ref 5–8)
PROT UR STRIP-MCNC: NEGATIVE MG/DL
RBC #/AREA URNS HPF: ABNORMAL /HPF (ref 0–5)
SP GR UR REFRACTOMETRY: 1.02 (ref 1–1.03)
UA: UC IF INDICATED,UAUC: ABNORMAL
UROBILINOGEN UR QL STRIP.AUTO: 0.2 EU/DL (ref 0.2–1)
WBC URNS QL MICRO: ABNORMAL /HPF (ref 0–4)

## 2021-12-20 PROCEDURE — G8510 SCR DEP NEG, NO PLAN REQD: HCPCS | Performed by: FAMILY MEDICINE

## 2021-12-20 PROCEDURE — G8754 DIAS BP LESS 90: HCPCS | Performed by: FAMILY MEDICINE

## 2021-12-20 PROCEDURE — 3017F COLORECTAL CA SCREEN DOC REV: CPT | Performed by: FAMILY MEDICINE

## 2021-12-20 PROCEDURE — 99213 OFFICE O/P EST LOW 20 MIN: CPT | Performed by: FAMILY MEDICINE

## 2021-12-20 PROCEDURE — 1090F PRES/ABSN URINE INCON ASSESS: CPT | Performed by: FAMILY MEDICINE

## 2021-12-20 PROCEDURE — G8752 SYS BP LESS 140: HCPCS | Performed by: FAMILY MEDICINE

## 2021-12-20 PROCEDURE — G9899 SCRN MAM PERF RSLTS DOC: HCPCS | Performed by: FAMILY MEDICINE

## 2021-12-20 PROCEDURE — G8419 CALC BMI OUT NRM PARAM NOF/U: HCPCS | Performed by: FAMILY MEDICINE

## 2021-12-20 PROCEDURE — G8536 NO DOC ELDER MAL SCRN: HCPCS | Performed by: FAMILY MEDICINE

## 2021-12-20 PROCEDURE — G8427 DOCREV CUR MEDS BY ELIG CLIN: HCPCS | Performed by: FAMILY MEDICINE

## 2021-12-20 PROCEDURE — G8399 PT W/DXA RESULTS DOCUMENT: HCPCS | Performed by: FAMILY MEDICINE

## 2021-12-20 PROCEDURE — 1101F PT FALLS ASSESS-DOCD LE1/YR: CPT | Performed by: FAMILY MEDICINE

## 2021-12-20 RX ORDER — PRAMOXINE HCL 1 %
TOWELETTE (EA) TOPICAL
COMMUNITY

## 2021-12-20 RX ORDER — FLUCONAZOLE 100 MG/1
100 TABLET ORAL DAILY
Qty: 7 TABLET | Refills: 0 | Status: SHIPPED | OUTPATIENT
Start: 2021-12-20 | End: 2021-12-27

## 2021-12-20 NOTE — PROGRESS NOTES
HISTORY OF PRESENT ILLNESS  Alix Sims is a 66 y.o. female. HPI   Follow up on chronic medical problems. Doing the precautionary measures at home to reduce risks of exposure COVID19. Also wearing mask when she is going out. No known sick contacts or known exposure to [address]. Overall feeling well. HTN follow up:  Compliant w/ meds, low salt diet, and exercise. Tolerating change to benicar HCT. No home bp monitoring. No swelling, headache or dizziness. No chest pain, SOB, palpitations. DM type II follow up:  Compliant w/ meds, diabetic diet, and exercise. Has not been checking her BS over the past several months. Denies any tingling sensation. No polyuria and polydipsia. No blurred vision. No significant weight changes. Hypercholesterolemia follow up:  Compliant w/ low fat, low cholesterol diet. Has been taking the Lipitor nightly. Exercising some. No muscle nor no skin discoloration. Patient fasting today. HM:  Mammogram 7/7/2021   Colonoscopy 1/12/2021 by Dr. Michael Munroe repeat in 5 years.    Eye exam 12/8/2020 by Dr. Rachelle Hinojosa. Patient states she has an eye exam 12/21/2021. Patient Active Problem List   Diagnosis Code    Environmental allergies Z91.09    Hypovitaminosis D E55.9    Diabetes mellitus ([address] E11.9    Sarcoidosis D86.9    Essential hypertension I10    Hyperlipidemia E78.5    Encounter for medication monitoring Z51.81    Type 2 diabetes with nephropathy (HCC) E11.21       Current Outpatient Medications   Medication Sig Dispense Refill       Janumet XR 50-1,000 mg TM24 TAKE 2 TABS BY MOUTH DAILY (WITH BREAKFAST). 180 Tablet 3    olmesartan-hydroCHLOROthiazide (BENICAR HCT) 20-12.5 mg per tablet TAKE 1 TABLET BY MOUTH EVERY DAY 90 Tablet 3    atorvastatin (LIPITOR) 20 mg tablet TAKE 1 TABLET BY MOUTH EVERY DAY 90 Tablet 3    glipiZIDE SR (GLUCOTROL XL) 5 mg CR tablet Take 5 mg by mouth daily.       OTHER Prevagen- take 1 tab daily      glucose blood VI test strips (ASCENSIA CONTOUR) strip #100  As directed 100 Strip 11       Allergies   Allergen Reactions    Azithromycin Other (comments)     Hallucinations                                Past Medical History:   Diagnosis Date    Diabetes ([address] 2012    HTN (hypertension) 3/16/2010    Hypercholesterolemia     Sarcoidosis 1980s                  repair tear left knee    [place] COLONOSCOPY W/BIOPSY SINGLE/MULTIPLE  07/2010    repear in 5yr /dr. Elmer Cardozo.       Problem Relation Age of Onset    Cancer Mother         lung    Stroke Mother     Diabetes Father     Hypertension Father     Hypertension Sister     Diabetes Sister     Diabetes Maternal Grandmother        Social History     Tobacco Use    Smoking status: Never Smoker    Smokeless tobacco: Never Used   Substance Use Topics    Alcohol use: No     Alcohol/week: 0.0 standard drinks        Lab Results   Component Value Date/Time    WBC 4.8 11/23/2021 10:36 AM    HGB 10.6 (L) 11/23/2021 10:36 AM    HCT 33.0 (L) 11/23/2021 10:36 AM    PLATELET 464 33/99/8698 10:36 AM    MCV 93.0 11/23/2021 10:36 AM     Lab Results   Component Value Date/Time    Cholesterol, total 150 11/23/2021 10:36 AM    HDL Cholesterol 79 11/23/2021 10:36 AM    LDL, calculated 57.6 11/23/2021 10:36 AM    Triglyceride 67 11/23/2021 10:36 AM    CHOL/HDL Ratio 1.9 11/23/2021 10:36 AM     Lab Results   Component Value Date/Time    TSH 0.836 03/06/2017 08:39 AM    T4, Free 1.20 02/14/2014 12:30 PM      Lab Results   Component Value Date/Time    Sodium 137 11/23/2021 10:36 AM    Potassium 4.3 11/23/2021 10:36 AM    Chloride 107 11/23/2021 10:36 AM    CO2 23 11/23/2021 10:36 AM    Anion gap 7 11/23/2021 10:36 AM    Glucose 110 (H) 11/23/2021 10:36 AM    BUN 20 11/23/2021 10:36 AM    Creatinine 1.20 (H) 11/23/2021 10:36 AM    BUN/Creatinine ratio 17 11/23/2021 10:36 AM    GFR est AA 54 (L) 11/23/2021 10:36 AM    GFR est non-AA 44 (L) 11/23/2021 10:36 AM    Calcium 9.5 11/23/2021 10:36 AM    Bilirubin, total 0.6 11/23/2021 10:36 AM    ALT (SGPT) 22 11/23/2021 10:36 AM    Alk. phosphatase 62 11/23/2021 10:36 AM    Protein, total 7.0 11/23/2021 10:36 AM    Albumin 3.9 11/23/2021 10:36 AM    Globulin 3.1 11/23/2021 10:36 AM    A-G Ratio 1.3 11/23/2021 10:36 AM      Lab Results   Component Value Date/Time    Hemoglobin A1c 6.2 (H) 05/19/2021 09:15 AM    Hemoglobin A1c (POC) 5.9 11/23/2021 10:25 AM         Review of Systems   Constitutional: Negative for malaise/fatigue. HENT: Negative for congestion. Eyes: Negative for blurred vision. Respiratory: Negative for cough and shortness of breath. Cardiovascular: Negative for chest pain, palpitations and leg swelling. Gastrointestinal: Negative for abdominal pain, constipation and heartburn. Genitourinary: Negative for dysuria, frequency and urgency. Thinks that she has yeast infection with vaginal itching over the past several days. Musculoskeletal: Negative for back pain and joint pain. Neurological: Negative for dizziness, tingling and headaches. Endo/Heme/Allergies: Negative for environmental allergies. Psychiatric/Behavioral: Negative for depression. The patient does not have insomnia. Physical Exam  Vitals and nursing note reviewed. Constitutional:       Appearance: Normal appearance. She is well-developed. Comments: /74 (BP 1 Location: Left arm, BP Patient Position: Sitting)   Pulse 81   Temp 97.8 °F (36.6 °C) (Oral)   Resp 16   Ht 5' 0.5\" (1.537 m)   Wt 150 lb 3.2 oz (68.1 kg)   LMP 04/15/2002   SpO2 99%   BMI 28.85 kg/m²      HENT:      Right Ear: Tympanic membrane and ear canal normal.      Left Ear: Tympanic membrane and ear canal normal.      Nose: No mucosal edema. Neck:      Thyroid: No thyromegaly. Vascular: No carotid bruit. Cardiovascular:      Rate and Rhythm: Normal rate and regular rhythm. Pulses: Normal pulses. Heart sounds: Normal heart sounds. No gallop.     Pulmonary: Effort: Pulmonary effort is normal.      Breath sounds: Normal breath sounds. Chest:   Breasts:      Right: No axillary adenopathy or supraclavicular adenopathy. Left: No axillary adenopathy or supraclavicular adenopathy. Abdominal:      General: Bowel sounds are normal.      Palpations: Abdomen is soft. There is no mass. Tenderness: There is no abdominal tenderness. Musculoskeletal:         General: No swelling. Normal range of motion. Cervical back: Normal range of motion and neck supple. Right lower leg: No edema. Left lower leg: No edema. Comments: Foot exam done . Normal sensation to PP, LT and vibration. Sensation intact to microfilament testing. Pulses intact. No swelling. No skin lesions or sores noted. No tinea present. Lymphadenopathy:      Cervical: No cervical adenopathy. Upper Body:      Right upper body: No supraclavicular, axillary or pectoral adenopathy. Left upper body: No supraclavicular, axillary or pectoral adenopathy. Skin:     General: Skin is warm and dry. Neurological:      General: No focal deficit present. Mental Status: She is alert and oriented to person, place, and time. Psychiatric:         Mood and Affect: Mood normal.         ASSESSMENT and PLAN  Diagnoses and all orders for this visit:    1. Routine general medical examination at a health care facility    2. Essential hypertension  Stable     3. Type 2 diabetes with nephropathy (HCC)  a1c stable at 5.9%  -     AMB POC HEMOGLOBIN A1C  -     AMB POC GLUCOSE, QUANTITATIVE, BLOOD    4. Mixed hyperlipidemia  -     LIPID PANEL; Future    5. Chronic anemia  -     IRON PROFILE; Future  -     FERRITIN; Future  -     CBC W/O DIFF; Future    6. Gastroesophageal reflux disease without esophagitis  Stable on protonix    7. Vulvovaginitis due to yeast  -     fluconazole (DIFLUCAN) 150 mg tablet; Take 1 Tablet by mouth daily for 1 day.      8. Encounter for medication monitoring  -     URINALYSIS W/ REFLEX CULTURE; Future  -     METABOLIC PANEL, COMPREHENSIVE; Future        reviewed diet, exercise and weight control  cardiovascular risk and specific lipid/LDL goals reviewed  reviewed medications and side effects in detail  specific diabetic recommendations: low cholesterol diet, weight control and daily exercise discussed, home glucose monitoring emphasized, all medications, side effects and compliance discussed carefully, foot care discussed and Podiatry visits discussed, annual eye examinations at Ophthalmology discussed and glycohemoglobin and other lab monitoring discussed     I have discussed diagnosis listed in this note with pt and/or family. I have discussed treatment plans and options and the risk/benefit analysis of those options, including safe use of medications and possible medication side effects. Through the use of shared decision making we have agreed to the above plan. The patient has received an after-visit summary and questions were answered concerning future plans and follow up. Advise pt of any urgent changes then to proceed to the ER.

## 2021-12-20 NOTE — PATIENT INSTRUCTIONS
Atrophic Vaginitis: Care Instructions  Your Care Instructions     Atrophic vaginitis is an irritation of the vagina. It's caused by thinning tissues and less moisture in the vaginal walls. It often happens during menopause when hormone levels change. Surgery to remove the ovaries also can cause it. Your doctor may do tests to rule out other causes. And you may get tests to measure your hormone levels. The problem is most often treated with the hormone estrogen. It comes in a cream, tablets, or a soft plastic ring that is placed in the vagina. Follow-up care is a key part of your treatment and safety. Be sure to make and go to all appointments, and call your doctor if you are having problems. It's also a good idea to know your test results and keep a list of the medicines you take. How can you care for yourself at home? · Use a water-based lubricant for your vagina if sex is dry or painful. Examples are Astroglide, Wet Lubricant Gel, and K-Y Jelly. · Talk with your doctor about using low-dose vaginal estrogen. It treats dryness and thinning tissue. · Do not douche. · Having sex improves blood flow to the vagina. This helps keep your tissue healthy. When should you call for help? Watch closely for changes in your health, and be sure to contact your doctor if:    · You have unexpected vaginal bleeding.     · You do not get better as expected. Where can you learn more? Go to http://www.gray.com/  Enter R330 in the search box to learn more about \"Atrophic Vaginitis: Care Instructions. \"  Current as of: February 11, 2021               Content Version: 13.0  © 2006-2021 [employer]. Care instructions adapted under license by [employer] (which disclaims liability or warranty for this information).  If you have questions about a medical condition or this instruction, always ask your healthcare professional. Willie Soares disclaims any warranty or liability for your use of this information.

## 2021-12-20 NOTE — PROGRESS NOTES
HISTORY OF PRESENT ILLNESS  Brittany Keene is a 66 y.o. female. HPI   C/o vaginal itching over the past 1 month. Denies any vaginal discharge. Also c/o increased vaginal dryness and painful intercourse d/t dryness  No vaginal bleeding after intercourse. No dysuria. Patient Active Problem List   Diagnosis Code    Environmental allergies Z91.09    Hypovitaminosis D E55.9    Diabetes mellitus ([facility] E11.9    Sarcoidosis D86.9    Essential hypertension I10    Hyperlipidemia E78.5    Encounter for medication monitoring Z51.81    Type 2 diabetes with nephropathy (HCC) E11.21       Current Outpatient Medications   Medication Sig Dispense Refill    pramoxine (Vagisil Maximum Strength) 1 % towl by Apply Externally route. As needed      pantoprazole (PROTONIX) 40 mg tablet TAKE 1 TABLET BY MOUTH EVERY DAY 90 Tablet 3    Janumet XR 50-1,000 mg TM24 TAKE 2 TABS BY MOUTH DAILY (WITH BREAKFAST). 180 Tablet 3    olmesartan-hydroCHLOROthiazide (BENICAR HCT) 20-12.5 mg per tablet TAKE 1 TABLET BY MOUTH EVERY DAY 90 Tablet 3    atorvastatin (LIPITOR) 20 mg tablet TAKE 1 TABLET BY MOUTH EVERY DAY 90 Tablet 3    glipiZIDE SR (GLUCOTROL XL) 5 mg CR tablet Take 5 mg by mouth daily.  OTHER Prevagen- take 1 tab daily      glucose blood VI test strips (ASCENSIA CONTOUR) strip #100  As directed 100 Strip 11       Allergies   Allergen Reactions    Azithromycin Other (comments)     Hallucinations       Past Medical History:   Diagnosis Date    Diabetes ([facility] 2012    HTN (hypertension) 3/16/2010    Hypercholesterolemia     Sarcoidosis 1980s       Past Surgical History:   Procedure Laterality Date    COLONOSCOPY N/A 1/12/2021    COLONOSCOPY performed by Moose Peck MD at [address] [place] [address] [place], COLON, DIAGNOSTIC  7/16/2007    repeat in 2010    HX ORTHOPAEDIC  09/2009    repair tear left knee    HI COLONOSCOPY W/BIOPSY SINGLE/MULTIPLE  07/2010    repear in 5yr /dr. Gerry Field.        Family History Problem Relation Age of Onset    Cancer Mother         lung    Stroke Mother     Diabetes Father     Hypertension Father     Hypertension Sister     Diabetes Sister     Diabetes Maternal Grandmother        Social History     Tobacco Use    Smoking status: Never Smoker    Smokeless tobacco: Never Used   Substance Use Topics    Alcohol use: No     Alcohol/week: 0.0 standard drinks        Lab Results   Component Value Date/Time    WBC 4.8 11/23/2021 10:36 AM    HGB 10.6 (L) 11/23/2021 10:36 AM    HCT 33.0 (L) 11/23/2021 10:36 AM    PLATELET 189 88/53/4841 10:36 AM    MCV 93.0 11/23/2021 10:36 AM     Lab Results   Component Value Date/Time    Cholesterol, total 150 11/23/2021 10:36 AM    HDL Cholesterol 79 11/23/2021 10:36 AM    LDL, calculated 57.6 11/23/2021 10:36 AM    Triglyceride 67 11/23/2021 10:36 AM    CHOL/HDL Ratio 1.9 11/23/2021 10:36 AM     Lab Results   Component Value Date/Time    TSH 0.836 03/06/2017 08:39 AM    T4, Free 1.20 02/14/2014 12:30 PM      Lab Results   Component Value Date/Time    Sodium 137 11/23/2021 10:36 AM    Potassium 4.3 11/23/2021 10:36 AM    Chloride 107 11/23/2021 10:36 AM    CO2 23 11/23/2021 10:36 AM    Anion gap 7 11/23/2021 10:36 AM    Glucose 110 (H) 11/23/2021 10:36 AM    BUN 20 11/23/2021 10:36 AM    Creatinine 1.20 (H) 11/23/2021 10:36 AM    BUN/Creatinine ratio 17 11/23/2021 10:36 AM    GFR est AA 54 (L) 11/23/2021 10:36 AM    GFR est non-AA 44 (L) 11/23/2021 10:36 AM    Calcium 9.5 11/23/2021 10:36 AM    Bilirubin, total 0.6 11/23/2021 10:36 AM    ALT (SGPT) 22 11/23/2021 10:36 AM    Alk.  phosphatase 62 11/23/2021 10:36 AM    Protein, total 7.0 11/23/2021 10:36 AM    Albumin 3.9 11/23/2021 10:36 AM    Globulin 3.1 11/23/2021 10:36 AM    A-G Ratio 1.3 11/23/2021 10:36 AM      Lab Results   Component Value Date/Time    Hemoglobin A1c 6.2 (H) 05/19/2021 09:15 AM    Hemoglobin A1c (POC) 5.9 11/23/2021 10:25 AM         Review of Systems       Physical Exam  Vitals and nursing note reviewed. Constitutional:       Appearance: Normal appearance. She is well-developed. Comments: /80 (BP 1 Location: Left arm, BP Patient Position: Sitting)   Pulse 79   Temp 97.1 °F (36.2 °C) (Oral)   Resp 16   Ht 5' 0.5\" (1.537 m)   Wt 149 lb 6.4 oz (67.8 kg)   LMP 04/15/2002   SpO2 100%   BMI 28.70 kg/m²      Neck:      Thyroid: No thyromegaly. Cardiovascular:      Rate and Rhythm: Normal rate and regular rhythm. Heart sounds: Normal heart sounds. No gallop. Pulmonary:      Effort: Pulmonary effort is normal.      Breath sounds: Normal breath sounds. Genitourinary:     Labia:         Right: Rash and tenderness present. Left: Rash and tenderness present. Vagina: No vaginal discharge. Comments: Candidal rash on the labia majora with marked atrophic changes and dryness around the introitus. Neurological:      Mental Status: She is alert. ASSESSMENT and PLAN  Diagnoses and all orders for this visit:    1. Candidal vulvitis  -     URINALYSIS W/ REFLEX CULTURE; Future  -     fluconazole (DIFLUCAN) 100 mg tablet; Take 1 Tablet by mouth daily for 7 days. FDA advises cautious prescribing of oral fluconazole in pregnancy. 2. Post-menopausal atrophic vaginitis  -     conjugated estrogens (PREMARIN) 0.625 mg/gram vaginal cream; Insert 0.5 g into vagina every Monday and Thursday. Counseled on postmenopausal vaginitis and dryness. referral to PT if sx not improved with premarin. reviewed medications and side effects in detail    I have discussed diagnosis listed in this note with pt and/or family. I have discussed treatment plans and options and the risk/benefit analysis of those options, including safe use of medications and possible medication side effects. Through the use of shared decision making we have agreed to the above plan.  The patient has received an after-visit summary and questions were answered concerning future plans and follow up. Advise pt of any urgent changes then to proceed to the ER.

## 2021-12-20 NOTE — PROGRESS NOTES
Chief Complaint   Patient presents with    Vaginal Itching     pt c/o vaginal itching for about 1 month           1. Have you been to the ER, urgent care clinic since your last visit? Hospitalized since your last visit? No    2. Have you seen or consulted any other health care providers outside of the [address] [place] [address] since your last visit? Include any pap smears or colon screening.  No

## 2022-02-22 ENCOUNTER — OFFICE VISIT (OUTPATIENT)
Dept: FAMILY MEDICINE CLINIC | Age: 72
End: 2022-02-22
Payer: COMMERCIAL

## 2022-02-22 VITALS
HEIGHT: 60 IN | SYSTOLIC BLOOD PRESSURE: 132 MMHG | HEART RATE: 97 BPM | OXYGEN SATURATION: 98 % | WEIGHT: 149 LBS | DIASTOLIC BLOOD PRESSURE: 72 MMHG | TEMPERATURE: 97.9 F | RESPIRATION RATE: 16 BRPM | BODY MASS INDEX: 29.25 KG/M2

## 2022-02-22 DIAGNOSIS — E78.2 MIXED HYPERLIPIDEMIA: ICD-10-CM

## 2022-02-22 DIAGNOSIS — E11.21 TYPE 2 DIABETES WITH NEPHROPATHY (HCC): ICD-10-CM

## 2022-02-22 DIAGNOSIS — Z51.81 ENCOUNTER FOR MEDICATION MONITORING: ICD-10-CM

## 2022-02-22 DIAGNOSIS — I10 ESSENTIAL HYPERTENSION: Primary | ICD-10-CM

## 2022-02-22 LAB
ANION GAP SERPL CALC-SCNC: 5 MMOL/L (ref 5–15)
BUN SERPL-MCNC: 18 MG/DL (ref 6–20)
BUN/CREAT SERPL: 14 (ref 12–20)
CALCIUM SERPL-MCNC: 9.7 MG/DL (ref 8.5–10.1)
CHLORIDE SERPL-SCNC: 106 MMOL/L (ref 97–108)
CO2 SERPL-SCNC: 26 MMOL/L (ref 21–32)
CREAT SERPL-MCNC: 1.26 MG/DL (ref 0.55–1.02)
GLUCOSE POC: 142 MG/DL
GLUCOSE SERPL-MCNC: 131 MG/DL (ref 65–100)
HBA1C MFR BLD HPLC: 5.7 %
POTASSIUM SERPL-SCNC: 4.3 MMOL/L (ref 3.5–5.1)
SODIUM SERPL-SCNC: 137 MMOL/L (ref 136–145)

## 2022-02-22 PROCEDURE — 3044F HG A1C LEVEL LT 7.0%: CPT | Performed by: FAMILY MEDICINE

## 2022-02-22 PROCEDURE — 1090F PRES/ABSN URINE INCON ASSESS: CPT | Performed by: FAMILY MEDICINE

## 2022-02-22 PROCEDURE — 99214 OFFICE O/P EST MOD 30 MIN: CPT | Performed by: FAMILY MEDICINE

## 2022-02-22 PROCEDURE — G8754 DIAS BP LESS 90: HCPCS | Performed by: FAMILY MEDICINE

## 2022-02-22 PROCEDURE — 1101F PT FALLS ASSESS-DOCD LE1/YR: CPT | Performed by: FAMILY MEDICINE

## 2022-02-22 PROCEDURE — G8510 SCR DEP NEG, NO PLAN REQD: HCPCS | Performed by: FAMILY MEDICINE

## 2022-02-22 PROCEDURE — G8419 CALC BMI OUT NRM PARAM NOF/U: HCPCS | Performed by: FAMILY MEDICINE

## 2022-02-22 PROCEDURE — G8752 SYS BP LESS 140: HCPCS | Performed by: FAMILY MEDICINE

## 2022-02-22 PROCEDURE — G9899 SCRN MAM PERF RSLTS DOC: HCPCS | Performed by: FAMILY MEDICINE

## 2022-02-22 PROCEDURE — G8399 PT W/DXA RESULTS DOCUMENT: HCPCS | Performed by: FAMILY MEDICINE

## 2022-02-22 PROCEDURE — 3017F COLORECTAL CA SCREEN DOC REV: CPT | Performed by: FAMILY MEDICINE

## 2022-02-22 PROCEDURE — G8427 DOCREV CUR MEDS BY ELIG CLIN: HCPCS | Performed by: FAMILY MEDICINE

## 2022-02-22 PROCEDURE — 83036 HEMOGLOBIN GLYCOSYLATED A1C: CPT | Performed by: FAMILY MEDICINE

## 2022-02-22 PROCEDURE — 82947 ASSAY GLUCOSE BLOOD QUANT: CPT | Performed by: FAMILY MEDICINE

## 2022-02-22 PROCEDURE — G8536 NO DOC ELDER MAL SCRN: HCPCS | Performed by: FAMILY MEDICINE

## 2022-02-22 PROCEDURE — 2022F DILAT RTA XM EVC RTNOPTHY: CPT | Performed by: FAMILY MEDICINE

## 2022-02-22 NOTE — PROGRESS NOTES
Identified pt with two pt identifiers(name and ). Reviewed record in preparation for visit and have obtained necessary documentation. All patient medications has been reviewed. Chief Complaint   Patient presents with    Hypertension    Diabetes    Follow-up       3 most recent PHQ Screens 2022   Little interest or pleasure in doing things Not at all   Feeling down, depressed, irritable, or hopeless Not at all   Total Score PHQ 2 0     Abuse Screening Questionnaire 2022   Do you ever feel afraid of your partner? N   Are you in a relationship with someone who physically or mentally threatens you? N   Is it safe for you to go home? Y       There are no preventive care reminders to display for this patient. Health Maintenance Review: Patient reminded of \"due or due soon\" health maintenance. I have asked the patient to contact his/her primary care provider (PCP) for follow-up on his/her health maintenance. Vitals:    22 0949   BP: 132/72   Pulse: 97   Resp: 16   Temp: 97.9 °F (36.6 °C)   TempSrc: Oral   SpO2: 98%   Weight: 149 lb (67.6 kg)   Height: 5' (1.524 m)   PainSc:   0 - No pain   LMP: 04/15/2002       Wt Readings from Last 3 Encounters:   22 149 lb (67.6 kg)   21 149 lb 6.4 oz (67.8 kg)   21 150 lb 3.2 oz (68.1 kg)     Temp Readings from Last 3 Encounters:   22 97.9 °F (36.6 °C) (Oral)   21 97.1 °F (36.2 °C) (Oral)   21 97.8 °F (36.6 °C) (Oral)     BP Readings from Last 3 Encounters:   22 132/72   21 138/80   21 132/74     Pulse Readings from Last 3 Encounters:   22 97   21 79   21 81       Coordination of Care Questionnaire:   1) Have you been to an emergency room, urgent care, or hospitalized since your last visit? No      2. Have seen or consulted any other health care provider since your last visit?   No

## 2022-03-19 PROBLEM — E11.21 TYPE 2 DIABETES WITH NEPHROPATHY (HCC): Status: ACTIVE | Noted: 2018-07-03

## 2022-06-10 ENCOUNTER — TRANSCRIBE ORDER (OUTPATIENT)
Dept: SCHEDULING | Age: 72
End: 2022-06-10

## 2022-06-10 DIAGNOSIS — Z12.31 SCREENING MAMMOGRAM FOR HIGH-RISK PATIENT: Primary | ICD-10-CM

## 2022-06-27 ENCOUNTER — OFFICE VISIT (OUTPATIENT)
Dept: FAMILY MEDICINE CLINIC | Age: 72
End: 2022-06-27
Payer: COMMERCIAL

## 2022-06-27 ENCOUNTER — OFFICE VISIT (OUTPATIENT)
Dept: FAMILY MEDICINE CLINIC | Age: 72
End: 2022-06-27

## 2022-06-27 DIAGNOSIS — Z20.822 CONTACT WITH AND (SUSPECTED) EXPOSURE TO COVID-19: Primary | ICD-10-CM

## 2022-06-27 LAB — SARS-COV-2 PCR, POC: NEGATIVE

## 2022-06-27 PROCEDURE — 87635 SARS-COV-2 COVID-19 AMP PRB: CPT | Performed by: FAMILY MEDICINE

## 2022-06-27 NOTE — PROGRESS NOTES
HISTORY OF PRESENT ILLNESS  Yonas Friedman is a 66 y.o. female. HPI   Follow up on chronic medical problems. Doing the precautionary measures at home to reduce risks of exposure COVID19. Also wearing mask when she is going out. No known sick contacts or known exposure to [address]. Overall feeling well. HTN follow up:  Compliant w/ meds, low salt diet, and exercise. Tolerating change to benicar HCT. No home bp monitoring. No swelling, headache or dizziness. No chest pain, SOB, palpitations. DM type II follow up:  Compliant w/ meds, diabetic diet, and exercise. Has not been checking her BS over the past several months. Denies any tingling sensation. No polyuria and polydipsia. No blurred vision. No significant weight changes. Hypercholesterolemia follow up:  Compliant w/ low fat, low cholesterol diet. Has been taking the Lipitor nightly. Exercising some. No muscle nor no skin discoloration. Patient fasting today. HM:  Mammogram 7/7/2021   Colonoscopy 1/12/2021 by Dr. Larry Warner repeat in 5 years.    Eye exam 12/8/2020 by Dr. Cm Winter. Patient states she has an eye exam 12/21/2021. Patient Active Problem List   Diagnosis Code    Environmental allergies Z91.09    Hypovitaminosis D E55.9    Diabetes mellitus ([facility] E11.9    Sarcoidosis D86.9    Essential hypertension I10    Hyperlipidemia E78.5    Encounter for medication monitoring Z51.81    Type 2 diabetes with nephropathy (HCC) E11.21       Current Outpatient Medications   Medication Sig Dispense Refill    pramoxine (Vagisil Maximum Strength) 1 % towl by Apply Externally route. As needed      conjugated estrogens (PREMARIN) 0.625 mg/gram vaginal cream Insert 0.5 g into vagina every Monday and Thursday. 30 g 3    pantoprazole (PROTONIX) 40 mg tablet TAKE 1 TABLET BY MOUTH EVERY DAY 90 Tablet 3    Janumet XR 50-1,000 mg TM24 TAKE 2 TABS BY MOUTH DAILY (WITH BREAKFAST).  180 Tablet 3    olmesartan-hydroCHLOROthiazide (BENICAR HCT) 20-12.5 mg per tablet TAKE 1 TABLET BY MOUTH EVERY DAY 90 Tablet 3    atorvastatin (LIPITOR) 20 mg tablet TAKE 1 TABLET BY MOUTH EVERY DAY 90 Tablet 3    glipiZIDE SR (GLUCOTROL XL) 5 mg CR tablet Take 5 mg by mouth daily.  OTHER Prevagen- take 1 tab daily      glucose blood VI test strips (ASCENSIA CONTOUR) strip #100  As directed 100 Strip 11       Allergies   Allergen Reactions    Azithromycin Other (comments)     Hallucinations         Past Medical History:   Diagnosis Date    Diabetes ([address] 2012    HTN (hypertension) 3/16/2010    Hypercholesterolemia     Sarcoidosis 1980s         Past Surgical History:   Procedure Laterality Date    COLONOSCOPY N/A 1/12/2021    COLONOSCOPY performed by Wesley Day MD at [address] [place] [address] [place], COLON, DIAGNOSTIC  7/16/2007    repeat in 2010    HX ORTHOPAEDIC  09/2009    repair tear left knee    HI COLONOSCOPY W/BIOPSY SINGLE/MULTIPLE  07/2010    repear in 5yr /dr. Zaira Montes.          Family History   Problem Relation Age of Onset   Yuliya Orts Cancer Mother         lung    Stroke Mother     Diabetes Father     Hypertension Father     Hypertension Sister     Diabetes Sister     Diabetes Maternal Grandmother        Social History     Tobacco Use    Smoking status: Never Smoker    Smokeless tobacco: Never Used   Substance Use Topics    Alcohol use: No     Alcohol/week: 0.0 standard drinks        Lab Results   Component Value Date/Time    WBC 4.8 11/23/2021 10:36 AM    HGB 10.6 (L) 11/23/2021 10:36 AM    HCT 33.0 (L) 11/23/2021 10:36 AM    PLATELET 646 55/52/8371 10:36 AM    MCV 93.0 11/23/2021 10:36 AM     Lab Results   Component Value Date/Time    Cholesterol, total 150 11/23/2021 10:36 AM    HDL Cholesterol 79 11/23/2021 10:36 AM    LDL, calculated 57.6 11/23/2021 10:36 AM    Triglyceride 67 11/23/2021 10:36 AM    CHOL/HDL Ratio 1.9 11/23/2021 10:36 AM     Lab Results   Component Value Date/Time    TSH 0.836 03/06/2017 08:39 AM    T4, Free 1.20 02/14/2014 12:30 PM      Lab Results   Component Value Date/Time    Sodium 137 02/22/2022 10:32 AM    Potassium 4.3 02/22/2022 10:32 AM    Chloride 106 02/22/2022 10:32 AM    CO2 26 02/22/2022 10:32 AM    Anion gap 5 02/22/2022 10:32 AM    Glucose 131 (H) 02/22/2022 10:32 AM    BUN 18 02/22/2022 10:32 AM    Creatinine 1.26 (H) 02/22/2022 10:32 AM    BUN/Creatinine ratio 14 02/22/2022 10:32 AM    GFR est AA 51 (L) 02/22/2022 10:32 AM    GFR est non-AA 42 (L) 02/22/2022 10:32 AM    Calcium 9.7 02/22/2022 10:32 AM    Bilirubin, total 0.6 11/23/2021 10:36 AM    ALT (SGPT) 22 11/23/2021 10:36 AM    Alk. phosphatase 62 11/23/2021 10:36 AM    Protein, total 7.0 11/23/2021 10:36 AM    Albumin 3.9 11/23/2021 10:36 AM    Globulin 3.1 11/23/2021 10:36 AM    A-G Ratio 1.3 11/23/2021 10:36 AM      Lab Results   Component Value Date/Time    Hemoglobin A1c 6.2 (H) 05/19/2021 09:15 AM    Hemoglobin A1c (POC) 5.7 02/22/2022 10:08 AM         Review of Systems   Constitutional: Negative for malaise/fatigue. HENT: Negative for congestion. Eyes: Negative for blurred vision. Respiratory: Negative for cough and shortness of breath. Cardiovascular: Negative for chest pain, palpitations and leg swelling. Gastrointestinal: Negative for abdominal pain, constipation and heartburn. Genitourinary: Negative for dysuria, frequency and urgency. Musculoskeletal: Negative for back pain and joint pain. Neurological: Negative for dizziness, tingling and headaches. Endo/Heme/Allergies: Negative for environmental allergies. Psychiatric/Behavioral: Negative for depression. The patient does not have insomnia. Physical Exam  Vitals and nursing note reviewed. Constitutional:       Appearance: Normal appearance. She is well-developed.       Comments: /72 (BP 1 Location: Right arm, BP Patient Position: Sitting, BP Cuff Size: Adult)   Pulse 97   Temp 97.9 °F (36.6 °C) (Oral)   Resp 16   Ht 5' (1.524 m)   Wt 149 lb (67.6 kg)   LMP 04/15/2002   SpO2 98%   BMI 29.10 kg/m²    HENT:      Right Ear: Tympanic membrane and ear canal normal.      Left Ear: Tympanic membrane and ear canal normal.      Nose: No mucosal edema. Neck:      Thyroid: No thyromegaly. Cardiovascular:      Rate and Rhythm: Normal rate and regular rhythm. Heart sounds: Normal heart sounds. No gallop. Pulmonary:      Effort: Pulmonary effort is normal.      Breath sounds: Normal breath sounds. Abdominal:      General: Bowel sounds are normal.      Palpations: Abdomen is soft. There is no mass. Tenderness: There is no abdominal tenderness. Musculoskeletal:         General: Normal range of motion. Cervical back: Normal range of motion and neck supple. Right lower leg: No edema. Left lower leg: No edema. Comments: Foot exam done . Normal sensation to PP, LT and vibration. Sensation intact to microfilament testing. Pulses intact. No swelling. No skin lesions or sores noted. No tinea present. Lymphadenopathy:      Cervical: No cervical adenopathy. Skin:     General: Skin is warm and dry. Neurological:      General: No focal deficit present. Mental Status: She is alert and oriented to person, place, and time.    Psychiatric:         Mood and Affect: Mood normal.         ASSESSMENT and PLAN  {ASSESSMENT/PLAN:[id number]}\

## 2022-06-27 NOTE — PROGRESS NOTES
Called patient, covid test negative, per Dr. Agnieszka Muir if she develops symptoms she needs to retest, information given to patient.

## 2022-07-08 DIAGNOSIS — E11.21 TYPE II DIABETES MELLITUS WITH NEPHROPATHY (HCC): ICD-10-CM

## 2022-07-08 DIAGNOSIS — I10 ESSENTIAL HYPERTENSION: ICD-10-CM

## 2022-07-08 RX ORDER — OLMESARTAN MEDOXOMIL AND HYDROCHLOROTHIAZIDE 20/12.5 20; 12.5 MG/1; MG/1
TABLET ORAL
Qty: 90 TABLET | Refills: 3 | Status: SHIPPED | OUTPATIENT
Start: 2022-07-08

## 2022-07-08 RX ORDER — ATORVASTATIN CALCIUM 20 MG/1
TABLET, FILM COATED ORAL
Qty: 90 TABLET | Refills: 3 | Status: SHIPPED | OUTPATIENT
Start: 2022-07-08

## 2022-07-08 RX ORDER — SITAGLIPTIN AND METFORMIN HYDROCHLORIDE 50; 1000 MG/1; MG/1
TABLET, FILM COATED, EXTENDED RELEASE ORAL
Qty: 180 TABLET | Refills: 3 | Status: SHIPPED | OUTPATIENT
Start: 2022-07-08

## 2022-07-12 ENCOUNTER — HOSPITAL ENCOUNTER (OUTPATIENT)
Dept: MAMMOGRAPHY | Age: 72
Discharge: HOME | End: 2022-07-12
Attending: FAMILY MEDICINE
Payer: COMMERCIAL

## 2022-07-12 DIAGNOSIS — Z12.31 SCREENING MAMMOGRAM FOR HIGH-RISK PATIENT: ICD-10-CM

## 2022-07-12 PROCEDURE — 77063 BREAST TOMOSYNTHESIS BI: CPT

## 2022-07-27 ENCOUNTER — OFFICE VISIT (OUTPATIENT)
Dept: FAMILY MEDICINE CLINIC | Age: 72
End: 2022-07-27
Payer: COMMERCIAL

## 2022-07-27 VITALS
DIASTOLIC BLOOD PRESSURE: 66 MMHG | SYSTOLIC BLOOD PRESSURE: 132 MMHG | RESPIRATION RATE: 18 BRPM | HEIGHT: 60 IN | OXYGEN SATURATION: 99 % | HEART RATE: 71 BPM | TEMPERATURE: 98.2 F | WEIGHT: 141.2 LBS | BODY MASS INDEX: 27.72 KG/M2

## 2022-07-27 DIAGNOSIS — Z51.81 ENCOUNTER FOR MEDICATION MONITORING: ICD-10-CM

## 2022-07-27 DIAGNOSIS — L65.9 HAIR THINNING: ICD-10-CM

## 2022-07-27 DIAGNOSIS — I10 ESSENTIAL HYPERTENSION: Primary | ICD-10-CM

## 2022-07-27 DIAGNOSIS — E78.2 MIXED HYPERLIPIDEMIA: ICD-10-CM

## 2022-07-27 DIAGNOSIS — N95.2 ATROPHIC VAGINITIS: ICD-10-CM

## 2022-07-27 DIAGNOSIS — E11.21 TYPE 2 DIABETES WITH NEPHROPATHY (HCC): ICD-10-CM

## 2022-07-27 LAB
ALBUMIN SERPL-MCNC: 3.8 G/DL (ref 3.5–5)
ALBUMIN/GLOB SERPL: 1.2 {RATIO} (ref 1.1–2.2)
ALP SERPL-CCNC: 52 U/L (ref 45–117)
ALT SERPL-CCNC: 23 U/L (ref 12–78)
ANION GAP SERPL CALC-SCNC: 8 MMOL/L (ref 5–15)
AST SERPL-CCNC: 20 U/L (ref 15–37)
BILIRUB SERPL-MCNC: 0.6 MG/DL (ref 0.2–1)
BUN SERPL-MCNC: 26 MG/DL (ref 6–20)
BUN/CREAT SERPL: 19 (ref 12–20)
CALCIUM SERPL-MCNC: 10.5 MG/DL (ref 8.5–10.1)
CHLORIDE SERPL-SCNC: 106 MMOL/L (ref 97–108)
CHOLEST SERPL-MCNC: 156 MG/DL
CO2 SERPL-SCNC: 26 MMOL/L (ref 21–32)
CREAT SERPL-MCNC: 1.35 MG/DL (ref 0.55–1.02)
ERYTHROCYTE [DISTWIDTH] IN BLOOD BY AUTOMATED COUNT: 13.8 % (ref 11.5–14.5)
GLOBULIN SER CALC-MCNC: 3.2 G/DL (ref 2–4)
GLUCOSE POC: 110 MG/DL
GLUCOSE SERPL-MCNC: 113 MG/DL (ref 65–100)
HBA1C MFR BLD HPLC: 5.7 %
HCT VFR BLD AUTO: 35.6 % (ref 35–47)
HDLC SERPL-MCNC: 91 MG/DL
HDLC SERPL: 1.7 {RATIO} (ref 0–5)
HGB BLD-MCNC: 11.3 G/DL (ref 11.5–16)
LDLC SERPL CALC-MCNC: 52.8 MG/DL (ref 0–100)
MCH RBC QN AUTO: 30.8 PG (ref 26–34)
MCHC RBC AUTO-ENTMCNC: 31.7 G/DL (ref 30–36.5)
MCV RBC AUTO: 97 FL (ref 80–99)
NRBC # BLD: 0 K/UL (ref 0–0.01)
NRBC BLD-RTO: 0 PER 100 WBC
PLATELET # BLD AUTO: 284 K/UL (ref 150–400)
PMV BLD AUTO: 10.1 FL (ref 8.9–12.9)
POTASSIUM SERPL-SCNC: 4.9 MMOL/L (ref 3.5–5.1)
PROT SERPL-MCNC: 7 G/DL (ref 6.4–8.2)
RBC # BLD AUTO: 3.67 M/UL (ref 3.8–5.2)
SODIUM SERPL-SCNC: 140 MMOL/L (ref 136–145)
TRIGL SERPL-MCNC: 61 MG/DL (ref ?–150)
TSH SERPL DL<=0.05 MIU/L-ACNC: 0.65 UIU/ML (ref 0.36–3.74)
VLDLC SERPL CALC-MCNC: 12.2 MG/DL
WBC # BLD AUTO: 4.3 K/UL (ref 3.6–11)

## 2022-07-27 PROCEDURE — 83036 HEMOGLOBIN GLYCOSYLATED A1C: CPT | Performed by: FAMILY MEDICINE

## 2022-07-27 PROCEDURE — 3044F HG A1C LEVEL LT 7.0%: CPT | Performed by: FAMILY MEDICINE

## 2022-07-27 PROCEDURE — 82947 ASSAY GLUCOSE BLOOD QUANT: CPT | Performed by: FAMILY MEDICINE

## 2022-07-27 PROCEDURE — 1123F ACP DISCUSS/DSCN MKR DOCD: CPT | Performed by: FAMILY MEDICINE

## 2022-07-27 PROCEDURE — 99214 OFFICE O/P EST MOD 30 MIN: CPT | Performed by: FAMILY MEDICINE

## 2022-07-27 NOTE — PROGRESS NOTES
Rm    Chief Complaint   Patient presents with    Diabetes     6 month follow up        Visit Vitals  /66 (BP 1 Location: Left upper arm, BP Patient Position: Sitting, BP Cuff Size: Adult)   Pulse 71   Temp 98.2 °F (36.8 °C) (Oral)   Resp 18   Ht 5' (1.524 m)   Wt 141 lb 3.2 oz (64 kg)   LMP 04/15/2002   SpO2 99%   BMI 27.58 kg/m²        1. Have you been to the ER, urgent care clinic since your last visit? Hospitalized since your last visit? No    2. Have you seen or consulted any other health care providers outside of the [address] [place] [address] since your last visit? Include any pap smears or colon screening. No       Health Maintenance Due   Topic Date Due    COVID-19 Vaccine (4 - Booster for Pfizer series) 02/05/2022        3 most recent PHQ Screens 7/27/2022   Little interest or pleasure in doing things Not at all   Feeling down, depressed, irritable, or hopeless Not at all   Total Score PHQ 2 0   Trouble falling or staying asleep, or sleeping too much Not at all   Feeling tired or having little energy Not at all   Poor appetite, weight loss, or overeating Not at all   Feeling bad about yourself - or that you are a failure or have let yourself or your family down Not at all   Trouble concentrating on things such as school, work, reading, or watching TV Not at all   Moving or speaking so slowly that other people could have noticed; or the opposite being so fidgety that others notice Not at all   Thoughts of being better off dead, or hurting yourself in some way Not at all   PHQ 9 Score 0        Fall Risk Assessment, last 12 mths 7/27/2022   Able to walk? Yes   Fall in past 12 months? 0   Do you feel unsteady?  0   Are you worried about falling 0       Learning Assessment 12/2/2014   PRIMARY LEARNER Patient   HIGHEST LEVEL OF EDUCATION - PRIMARY LEARNER  4 YEARS OF COLLEGE   BARRIERS PRIMARY LEARNER NONE   CO-LEARNER CAREGIVER No   PRIMARY LANGUAGE ENGLISH   LEARNER PREFERENCE PRIMARY OTHER (COMMENT) ANSWERED BY patient   RELATIONSHIP SELF

## 2022-07-27 NOTE — PROGRESS NOTES
HISTORY OF PRESENT ILLNESS  Gloria Rowe is a 66 y.o. female. HPI   Follow up on chronic medical problems. Doing the precautionary measures at home to reduce risks of exposure COVID19. Also wearing mask when she is going out. No known sick contacts or known exposure to [address]. Overall feeling well. HTN follow up:  Compliant w/ meds, low salt diet, and exercise. Tolerating change to benicar HCT. No home bp monitoring. No swelling, headache or dizziness. No chest pain, SOB, palpitations. DM type II follow up:  Compliant w/ meds, diabetic diet, and exercise. Has not been checking her BS over the past several months. Denies any tingling sensation. No polyuria and polydipsia. No blurred vision. No significant weight changes. Hypercholesterolemia follow up:  Compliant w/ low fat, low cholesterol diet. Has been taking the Lipitor nightly. Exercising some. No muscle nor no skin discoloration. Patient fasting today. HM:  Mammogram 7/12/2022  Colonoscopy 1/12/2021 by Dr. Noel Yu repeat in 5 years. Eye exam 12/21/2021. Patient Active Problem List   Diagnosis Code    Environmental allergies Z91.09    Hypovitaminosis D E55.9    Diabetes mellitus ([facility] [address] E11.9    Sarcoidosis D86.9    Essential hypertension I10    Hyperlipidemia E78.5    Encounter for medication monitoring Z51.81    Type 2 diabetes with nephropathy (HCC) E11.21       Current Outpatient Medications   Medication Sig Dispense Refill    Janumet XR 50-1,000 mg TM24 TAKE 2 TABS BY MOUTH DAILY (WITH BREAKFAST). 180 Tablet 3    atorvastatin (LIPITOR) 20 mg tablet TAKE 1 TABLET BY MOUTH EVERY DAY 90 Tablet 3    olmesartan-hydroCHLOROthiazide (BENICAR HCT) 20-12.5 mg per tablet TAKE 1 TABLET BY MOUTH EVERY DAY 90 Tablet 3    pramoxine (Vagisil Maximum Strength) 1 % towl by Apply Externally route. As needed      conjugated estrogens (PREMARIN) 0.625 mg/gram vaginal cream Insert 0.5 g into vagina every Monday and Thursday. 30 g 3    pantoprazole (PROTONIX) 40 mg tablet TAKE 1 TABLET BY MOUTH EVERY DAY 90 Tablet 3    glipiZIDE SR (GLUCOTROL XL) 5 mg CR tablet Take 5 mg by mouth daily.  OTHER Prevagen- take 1 tab daily      glucose blood VI test strips (ASCENSIA CONTOUR) strip #100  As directed 100 Strip 11       Allergies   Allergen Reactions    Azithromycin Other (comments)     Hallucinations           Past Medical History:   Diagnosis Date    Diabetes ([address] 2012    HTN (hypertension) 3/16/2010    Hypercholesterolemia     Sarcoidosis 1980s           Past Surgical History:   Procedure Laterality Date    COLONOSCOPY N/A 1/12/2021    COLONOSCOPY performed by Yokasta Brothesr MD at [address] [place] [address] [place], COLON, DIAGNOSTIC  7/16/2007    repeat in 2010    HX ORTHOPAEDIC  09/2009    repair tear left knee    [place] COLONOSCOPY W/BIOPSY SINGLE/MULTIPLE  07/2010    repear in 5yr /dr. Katelyn Gibbons.            Family History   Problem Relation Age of Onset   [place] Cancer Mother         lung    Stroke Mother     Diabetes Father     Hypertension Father     Hypertension Sister     Diabetes Sister     Diabetes Maternal Grandmother        Social History     Tobacco Use    Smoking status: Never    Smokeless tobacco: Never   Substance Use Topics    Alcohol use: No     Alcohol/week: 0.0 standard drinks        Lab Results   Component Value Date/Time    WBC 4.8 11/23/2021 10:36 AM    HGB 10.6 (L) 11/23/2021 10:36 AM    HCT 33.0 (L) 11/23/2021 10:36 AM    PLATELET 935 44/28/6188 10:36 AM    MCV 93.0 11/23/2021 10:36 AM     Lab Results   Component Value Date/Time    Cholesterol, total 150 11/23/2021 10:36 AM    HDL Cholesterol 79 11/23/2021 10:36 AM    LDL, calculated 57.6 11/23/2021 10:36 AM    Triglyceride 67 11/23/2021 10:36 AM    CHOL/HDL Ratio 1.9 11/23/2021 10:36 AM     Lab Results   Component Value Date/Time    TSH 0.836 03/06/2017 08:39 AM    T4, Free 1.20 02/14/2014 12:30 PM      Lab Results   Component Value Date/Time    Sodium 137 02/22/2022 10:32 AM    Potassium 4.3 02/22/2022 10:32 AM    Chloride 106 02/22/2022 10:32 AM    CO2 26 02/22/2022 10:32 AM    Anion gap 5 02/22/2022 10:32 AM    Glucose 131 (H) 02/22/2022 10:32 AM    BUN 18 02/22/2022 10:32 AM    Creatinine 1.26 (H) 02/22/2022 10:32 AM    BUN/Creatinine ratio 14 02/22/2022 10:32 AM    GFR est AA 51 (L) 02/22/2022 10:32 AM    GFR est non-AA 42 (L) 02/22/2022 10:32 AM    Calcium 9.7 02/22/2022 10:32 AM    Bilirubin, total 0.6 11/23/2021 10:36 AM    ALT (SGPT) 22 11/23/2021 10:36 AM    Alk. phosphatase 62 11/23/2021 10:36 AM    Protein, total 7.0 11/23/2021 10:36 AM    Albumin 3.9 11/23/2021 10:36 AM    Globulin 3.1 11/23/2021 10:36 AM    A-G Ratio 1.3 11/23/2021 10:36 AM      Lab Results   Component Value Date/Time    Hemoglobin A1c 6.2 (H) 05/19/2021 09:15 AM    Hemoglobin A1c (POC) 5.7 02/22/2022 10:08 AM         Review of Systems   Constitutional:  Negative for malaise/fatigue. HENT:  Negative for congestion. Eyes:  Negative for blurred vision. Respiratory:  Negative for cough and shortness of breath. Cardiovascular:  Negative for chest pain, palpitations and leg swelling. Gastrointestinal:  Negative for abdominal pain, constipation and heartburn. Genitourinary:  Negative for dysuria, frequency and urgency. Using premarin vaginal cream but still having vaginal dryness, itching and discomfort with intercourse. Musculoskeletal:  Negative for back pain and joint pain. Skin:         C/o hair thinning. We discussed conditioning and adding biotin   Neurological:  Negative for dizziness, tingling and headaches. Endo/Heme/Allergies:  Negative for environmental allergies. Psychiatric/Behavioral:  Negative for depression. The patient does not have insomnia. Physical Exam  Vitals and nursing note reviewed. Constitutional:       Appearance: Normal appearance. She is well-developed.       Comments: /66 (BP 1 Location: Left upper arm, BP Patient Position: Sitting, BP Cuff Size: Adult)   Pulse 71   Temp 98.2 °F (36.8 °C) (Oral)   Resp 18   Ht 5' (1.524 m)   Wt 141 lb 3.2 oz (64 kg)   LMP 04/15/2002   SpO2 99%   BMI 27.58 kg/m²    HENT:      Right Ear: Tympanic membrane and ear canal normal.      Left Ear: Tympanic membrane and ear canal normal.   Neck:      Thyroid: No thyromegaly. Cardiovascular:      Rate and Rhythm: Normal rate and regular rhythm. Heart sounds: Normal heart sounds. Pulmonary:      Effort: Pulmonary effort is normal.      Breath sounds: Normal breath sounds. Abdominal:      General: Bowel sounds are normal.      Palpations: Abdomen is soft. There is no mass. Tenderness: There is no abdominal tenderness. Musculoskeletal:         General: Normal range of motion. Cervical back: Normal range of motion and neck supple. Right lower leg: No edema. Left lower leg: No edema. Lymphadenopathy:      Cervical: No cervical adenopathy. Skin:     General: Skin is warm and dry. Neurological:      General: No focal deficit present. Mental Status: She is alert and oriented to person, place, and time. Psychiatric:         Mood and Affect: Mood normal.       ASSESSMENT and PLAN  Diagnoses and all orders for this visit:    1. Essential hypertension  Discussed sodium restriction, high k rich diet, maintaining ideal body weight and regular exercise program such as daily walking 30 min perday 4-5 times per week, as physiologic means to achieve blood pressure control. Medication compliance advised. 2. Type 2 diabetes with nephropathy (HCC)  A1c 5.7%. Continue to monitor. Work on diet and exercise. -     AMB POC HEMOGLOBIN A1C  -     AMB POC GLUCOSE, QUANTITATIVE, BLOOD    3. Mixed hyperlipidemia  -     LIPID PANEL; Future    4. Encounter for medication monitoring  -     CBC W/O DIFF; Future  -     METABOLIC PANEL, COMPREHENSIVE; Future    5.  Atrophic vaginitis  -     REFERRAL TO OBSTETRICS AND GYNECOLOGY    6. Hair thinning  -     TSH 3RD GENERATION;  Future      Follow-up and Dispositions    Return in about 6 months (around 1/27/2023) for medicare wellness exam.       current treatment plan is effective, no change in therapy  reviewed diet, exercise and weight control  cardiovascular risk and specific lipid/LDL goals reviewed  reviewed medications and side effects in detail  specific diabetic recommendations: low cholesterol diet, weight control and daily exercise discussed, all medications, side effects and compliance discussed carefully, foot care discussed and Podiatry visits discussed, annual eye examinations at Ophthalmology discussed, and glycohemoglobin and other lab monitoring discussed

## 2022-08-23 ENCOUNTER — TELEPHONE (OUTPATIENT)
Dept: FAMILY MEDICINE CLINIC | Age: 72
End: 2022-08-23

## 2023-01-27 ENCOUNTER — OFFICE VISIT (OUTPATIENT)
Dept: FAMILY MEDICINE CLINIC | Age: 73
End: 2023-01-27
Payer: MEDICARE

## 2023-01-27 VITALS
HEART RATE: 72 BPM | SYSTOLIC BLOOD PRESSURE: 122 MMHG | OXYGEN SATURATION: 99 % | DIASTOLIC BLOOD PRESSURE: 74 MMHG | RESPIRATION RATE: 12 BRPM | BODY MASS INDEX: 26.9 KG/M2 | HEIGHT: 60 IN | WEIGHT: 137 LBS | TEMPERATURE: 98.4 F

## 2023-01-27 DIAGNOSIS — I10 ESSENTIAL HYPERTENSION: ICD-10-CM

## 2023-01-27 DIAGNOSIS — B37.31 YEAST VAGINITIS: ICD-10-CM

## 2023-01-27 DIAGNOSIS — Z51.81 ENCOUNTER FOR MEDICATION MONITORING: ICD-10-CM

## 2023-01-27 DIAGNOSIS — E78.2 MIXED HYPERLIPIDEMIA: ICD-10-CM

## 2023-01-27 DIAGNOSIS — K21.9 GASTROESOPHAGEAL REFLUX DISEASE WITHOUT ESOPHAGITIS: ICD-10-CM

## 2023-01-27 DIAGNOSIS — Z00.00 MEDICARE ANNUAL WELLNESS VISIT, SUBSEQUENT: Primary | ICD-10-CM

## 2023-01-27 DIAGNOSIS — E11.21 TYPE 2 DIABETES WITH NEPHROPATHY (HCC): ICD-10-CM

## 2023-01-27 LAB
ALBUMIN SERPL-MCNC: 4 G/DL (ref 3.5–5)
ALBUMIN/GLOB SERPL: 1.2 (ref 1.1–2.2)
ALP SERPL-CCNC: 60 U/L (ref 45–117)
ALT SERPL-CCNC: 18 U/L (ref 12–78)
ANION GAP SERPL CALC-SCNC: 6 MMOL/L (ref 5–15)
APPEARANCE UR: ABNORMAL
AST SERPL-CCNC: 11 U/L (ref 15–37)
BACTERIA URNS QL MICRO: NEGATIVE /HPF
BILIRUB SERPL-MCNC: 0.5 MG/DL (ref 0.2–1)
BILIRUB UR QL: NEGATIVE
BUN SERPL-MCNC: 27 MG/DL (ref 6–20)
BUN/CREAT SERPL: 21 (ref 12–20)
CALCIUM SERPL-MCNC: 10.3 MG/DL (ref 8.5–10.1)
CHLORIDE SERPL-SCNC: 106 MMOL/L (ref 97–108)
CHOLEST SERPL-MCNC: 180 MG/DL
CO2 SERPL-SCNC: 27 MMOL/L (ref 21–32)
COLOR UR: ABNORMAL
CREAT SERPL-MCNC: 1.28 MG/DL (ref 0.55–1.02)
CREAT UR-MCNC: 134 MG/DL
EPITH CASTS URNS QL MICRO: ABNORMAL /LPF
GLOBULIN SER CALC-MCNC: 3.4 G/DL (ref 2–4)
GLUCOSE POC: 125 MG/DL
GLUCOSE SERPL-MCNC: 139 MG/DL (ref 65–100)
GLUCOSE UR STRIP.AUTO-MCNC: NEGATIVE MG/DL
HBA1C MFR BLD HPLC: 5.7 %
HDLC SERPL-MCNC: 93 MG/DL
HDLC SERPL: 1.9 (ref 0–5)
HGB UR QL STRIP: NEGATIVE
HYALINE CASTS URNS QL MICRO: ABNORMAL /LPF (ref 0–5)
KETONES UR QL STRIP.AUTO: ABNORMAL MG/DL
LDLC SERPL CALC-MCNC: 77.6 MG/DL (ref 0–100)
LEUKOCYTE ESTERASE UR QL STRIP.AUTO: NEGATIVE
MICROALBUMIN UR-MCNC: 1.52 MG/DL
MICROALBUMIN/CREAT UR-RTO: 11 MG/G (ref 0–30)
NITRITE UR QL STRIP.AUTO: NEGATIVE
PH UR STRIP: 5.5 (ref 5–8)
POTASSIUM SERPL-SCNC: 4.8 MMOL/L (ref 3.5–5.1)
PROT SERPL-MCNC: 7.4 G/DL (ref 6.4–8.2)
PROT UR STRIP-MCNC: NEGATIVE MG/DL
RBC #/AREA URNS HPF: ABNORMAL /HPF (ref 0–5)
SODIUM SERPL-SCNC: 139 MMOL/L (ref 136–145)
SP GR UR REFRACTOMETRY: 1.02 (ref 1–1.03)
TRIGL SERPL-MCNC: 47 MG/DL (ref ?–150)
UROBILINOGEN UR QL STRIP.AUTO: 1 EU/DL (ref 0.2–1)
VLDLC SERPL CALC-MCNC: 9.4 MG/DL
WBC URNS QL MICRO: ABNORMAL /HPF (ref 0–4)

## 2023-01-27 RX ORDER — FLUCONAZOLE 150 MG/1
TABLET ORAL
Qty: 2 TABLET | Refills: 1 | Status: SHIPPED | OUTPATIENT
Start: 2023-01-27 | End: 2023-01-27 | Stop reason: SDUPTHER

## 2023-01-27 RX ORDER — FLUCONAZOLE 150 MG/1
TABLET ORAL
COMMUNITY
End: 2023-01-27 | Stop reason: SDUPTHER

## 2023-01-27 RX ORDER — GLIPIZIDE 5 MG/1
TABLET, FILM COATED, EXTENDED RELEASE ORAL
Qty: 30 TABLET | Refills: 0
Start: 2023-01-27

## 2023-01-27 RX ORDER — PANTOPRAZOLE SODIUM 40 MG/1
40 TABLET, DELAYED RELEASE ORAL
Qty: 90 TABLET | Refills: 3
Start: 2023-01-27

## 2023-01-27 RX ORDER — FLUCONAZOLE 150 MG/1
TABLET ORAL
Qty: 2 TABLET | Refills: 1 | Status: SHIPPED | OUTPATIENT
Start: 2023-01-27

## 2023-01-27 NOTE — PROGRESS NOTES
This is a Subsequent Medicare Annual Wellness Exam (AWV) (Performed 12 months after IPPE or effective date of Medicare Part B enrollment)    I have reviewed the patient's medical history in detail and updated the computerized patient record. History     Patient Active Problem List   Diagnosis Code    Environmental allergies Z91.09    Hypovitaminosis D E55.9    Diabetes mellitus ([facility] [address] E11.9    Sarcoidosis D86.9    Essential hypertension I10    Hyperlipidemia E78.5    Encounter for medication monitoring Z51.81    Type 2 diabetes with nephropathy (HCC) E11.21       Current Outpatient Medications   Medication Sig Dispense Refill    fluconazole (Diflucan) 150 mg tablet Diflucan 150 mg tablet   Take 1 tablet by oral route and then repeat in 72 hours 2 Tablet 1    Janumet XR 50-1,000 mg TM24 TAKE 2 TABS BY MOUTH DAILY (WITH BREAKFAST). 180 Tablet 3    atorvastatin (LIPITOR) 20 mg tablet TAKE 1 TABLET BY MOUTH EVERY DAY 90 Tablet 3    olmesartan-hydroCHLOROthiazide (BENICAR HCT) 20-12.5 mg per tablet TAKE 1 TABLET BY MOUTH EVERY DAY 90 Tablet 3    pramoxine (Vagisil Maximum Strength) 1 % towl by Apply Externally route. As needed      conjugated estrogens (PREMARIN) 0.625 mg/gram vaginal cream Insert 0.5 g into vagina every Monday and Thursday. 30 g 3    pantoprazole (PROTONIX) 40 mg tablet TAKE 1 TABLET BY MOUTH EVERY DAY 90 Tablet 3    glipiZIDE SR (GLUCOTROL XL) 5 mg CR tablet Take 5 mg by mouth daily.       OTHER Prevagen- take 1 tab daily      glucose blood VI test strips (ASCENSIA CONTOUR) strip #100  As directed 100 Strip 11       Allergies   Allergen Reactions    Azithromycin Other (comments)     Hallucinations         Past Medical History:   Diagnosis Date    Diabetes ([facility] [address] 2012    HTN (hypertension) 3/16/2010    Hypercholesterolemia     Sarcoidosis 1980s         Past Surgical History:   Procedure Laterality Date    COLONOSCOPY N/A 1/12/2021    COLONOSCOPY performed by Patrizia Poole MD at [facility] ENDOSCOPY    ENDOSCOPY, COLON, DIAGNOSTIC  7/16/2007    repeat in 2010    HX ORTHOPAEDIC  09/2009    repair tear left knee    [place] COLONOSCOPY W/BIOPSY SINGLE/MULTIPLE  07/2010    repear in 5yr /dr. Pipe Mendoza. Family History   Problem Relation Age of Onset    Cancer Mother         lung    Stroke Mother     Diabetes Father     Hypertension Father     Hypertension Sister     Diabetes Sister     Diabetes Maternal Grandmother        Social History     Tobacco Use    Smoking status: Never    Smokeless tobacco: Never   Substance Use Topics    Alcohol use: No     Alcohol/week: 0.0 standard drinks         Depression Risk Factor Screening:     PHQ over the last two weeks    Little interest or pleasure in doing things Not at all   Feeling down, depressed or hopeless Not at all   Total Score PHQ 2 0     Alcohol Risk Factor Screening: You do not drink alcohol or very rarely. Functional Ability and Level of Safety:   Hearing Loss  Hearing is good. Activities of Daily Living  The home contains: discussed safety equipment. Patient does total self care    Fall RiskFall Risk Assessment, last 12 mths    Able to walk? Yes   Fall in past 12 months? No     Functional Ability:   Does the patient exhibit a steady gait? yes    How long did it take the patient to get up and walk from a sitting position? seconds    Is the patient self reliant? (ie can do own laundry, meals, household chores)  yes   Does the patient handle his/her own medications? yes   Does the patient handle his/her own money? yes   Is the patients home safe (ie good lighting, handrails on stairs and bath, etc.)? yes   Did you notice or did patient express any hearing difficulties? no   Did you notice or did patient express any vision difficulties?   no        Advance Care Planning:   Patient was offered the opportunity to discuss advance care planning:  yes    Does patient have an Advance Directive:  no   If no, did you provide information on Caring Connections? yes      Abuse Screen  Patient is not abused    Cognitive Screening   Evaluation of Cognitive Function:  Has your family/caregiver stated any concerns about your memory: no    Patient Care Team   Patient Care Team:  Venkata Dozier MD as PCP - General    Assessment/Plan   Education and counseling provided:  Are appropriate based on today's review and evaluation  End-of-Life planning (with patient's consent)    ASSESSMENT and PLAN    Medicare Annual Wellness  Continue current treatment plan. Continue annual follow up. I have discussed diagnosis listed in this note with pt and/or family. I have discussed treatment plans and options and the risk/benefit analysis of those options, including safe use of medications and possible medication side effects. Through the use of shared decision making we have agreed to the above plan. The patient has received an after-visit summary and questions were answered concerning future plans and follow up. Advise pt of any urgent changes then to proceed to the ER.

## 2023-01-27 NOTE — PROGRESS NOTES
Patient identified by 2 identifiers. Chief Complaint   Patient presents with    Annual Wellness Visit     1. Have you been to the ER, urgent care clinic since your last visit? Hospitalized since your last visit? No    2. Have you seen or consulted any other health care providers outside of the [address] [place] [address] since your last visit? Include any pap smears or colon screening.  No

## 2023-01-27 NOTE — PROGRESS NOTES
HISTORY OF PRESENT ILLNESS  Charo Gilliam is a 67 y.o. female. HPI   Follow up on chronic medical problems. Overall feeling well. No issues or concerns noted today. Overall feeling well. HTN follow up:  Compliant w/ meds, low salt diet, and exercise. Tolerating benicar HCT. No home bp monitoring. No swelling, headache or dizziness. No chest pain, SOB, palpitations. DM type II follow up:  Compliant w/ meds, diabetic diet, and exercise. Has not been checking her BS over the past several months. Denies any tingling sensation. No polyuria and polydipsia. No blurred vision. No significant weight changes. Hypercholesterolemia follow up:  Compliant w/ low fat, low cholesterol diet. Has been taking the Lipitor nightly. Exercising some but plans to increased her exercise. No muscle nor no skin discoloration. Patient fasting today. HM:  Mammogram 7/12/2022  Colonoscopy 1/12/2021 by Dr. Reji Tijerina repeat in 5 years. Patient Active Problem List   Diagnosis Code    Environmental allergies Z91.09    Hypovitaminosis D E55.9    Diabetes mellitus ([facility] [address] E11.9    Sarcoidosis D86.9    Essential hypertension I10    Hyperlipidemia E78.5    Encounter for medication monitoring Z51.81    Type 2 diabetes with nephropathy (HCC) E11.21       Current Outpatient Medications   Medication Sig Dispense Refill    Janumet XR 50-1,000 mg TM24 TAKE 2 TABS BY MOUTH DAILY (WITH BREAKFAST). 180 Tablet 3    atorvastatin (LIPITOR) 20 mg tablet TAKE 1 TABLET BY MOUTH EVERY DAY 90 Tablet 3    olmesartan-hydroCHLOROthiazide (BENICAR HCT) 20-12.5 mg per tablet TAKE 1 TABLET BY MOUTH EVERY DAY 90 Tablet 3    pramoxine (Vagisil Maximum Strength) 1 % towl by Apply Externally route. As needed      conjugated estrogens (PREMARIN) 0.625 mg/gram vaginal cream Insert 0.5 g into vagina every Monday and Thursday.  30 g 3    pantoprazole (PROTONIX) 40 mg tablet TAKE 1 TABLET BY MOUTH EVERY DAY 90 Tablet 3    glipiZIDE SR (GLUCOTROL XL) 5 mg CR tablet Take 5 mg by mouth daily. OTHER Prevagen- take 1 tab daily      glucose blood VI test strips (ASCENSIA CONTOUR) strip #100  As directed 100 Strip 11       Allergies   Allergen Reactions    Azithromycin Other (comments)     Hallucinations           Past Medical History:   Diagnosis Date    Diabetes ([address] 2012    HTN (hypertension) 3/16/2010    Hypercholesterolemia     Sarcoidosis 1980s           Past Surgical History:   Procedure Laterality Date    COLONOSCOPY N/A 1/12/2021    COLONOSCOPY performed by Darya Tavarez MD at [address], COLON, DIAGNOSTIC  7/16/2007    repeat in 2010    HX ORTHOPAEDIC  09/2009    repair tear left knee    [place] COLONOSCOPY W/BIOPSY SINGLE/MULTIPLE  07/2010    repear in 5yr /dr. Jami Doran.            Family History   Problem Relation Age of Onset    Cancer Mother         lung    Stroke Mother     Diabetes Father     Hypertension Father     Hypertension Sister     Diabetes Sister     Diabetes Maternal Grandmother        Social History     Tobacco Use    Smoking status: Never    Smokeless tobacco: Never   Substance Use Topics    Alcohol use: No     Alcohol/week: 0.0 standard drinks        Lab Results   Component Value Date/Time    WBC 4.3 07/27/2022 08:48 AM    HGB 11.3 (L) 07/27/2022 08:48 AM    HCT 35.6 07/27/2022 08:48 AM    PLATELET 888 77/79/8273 08:48 AM    MCV 97.0 07/27/2022 08:48 AM     Lab Results   Component Value Date/Time    Cholesterol, total 156 07/27/2022 08:48 AM    HDL Cholesterol 91 07/27/2022 08:48 AM    LDL, calculated 52.8 07/27/2022 08:48 AM    Triglyceride 61 07/27/2022 08:48 AM    CHOL/HDL Ratio 1.7 07/27/2022 08:48 AM     Lab Results   Component Value Date/Time    TSH 0.65 07/27/2022 10:06 AM    T4, Free 1.20 02/14/2014 12:30 PM      Lab Results   Component Value Date/Time    Sodium 140 07/27/2022 08:48 AM    Potassium 4.9 07/27/2022 08:48 AM    Chloride 106 07/27/2022 08:48 AM    CO2 26 07/27/2022 08:48 AM    Anion gap 8 07/27/2022 08:48 AM    Glucose 113 (H) 07/27/2022 08:48 AM    BUN 26 (H) 07/27/2022 08:48 AM    Creatinine 1.35 (H) 07/27/2022 08:48 AM    BUN/Creatinine ratio 19 07/27/2022 08:48 AM    GFR est AA 47 (L) 07/27/2022 08:48 AM    GFR est non-AA 39 (L) 07/27/2022 08:48 AM    Calcium 10.5 (H) 07/27/2022 08:48 AM    Bilirubin, total 0.6 07/27/2022 08:48 AM    ALT (SGPT) 23 07/27/2022 08:48 AM    Alk. phosphatase 52 07/27/2022 08:48 AM    Protein, total 7.0 07/27/2022 08:48 AM    Albumin 3.8 07/27/2022 08:48 AM    Globulin 3.2 07/27/2022 08:48 AM    A-G Ratio 1.2 07/27/2022 08:48 AM      Lab Results   Component Value Date/Time    Hemoglobin A1c 6.2 (H) 05/19/2021 09:15 AM    Hemoglobin A1c (POC) 5.7 07/27/2022 08:22 AM         Review of Systems   Constitutional:  Negative for malaise/fatigue. HENT:  Negative for congestion. Eyes:  Negative for blurred vision. Respiratory:  Negative for cough and shortness of breath. Cardiovascular:  Negative for chest pain, palpitations and leg swelling. Gastrointestinal:  Negative for abdominal pain, constipation and heartburn. Genitourinary:  Negative for dysuria, frequency and urgency. Has slight vaginal itching and thinks related to yeast infection. Usually get diflucan from her GYN. Musculoskeletal:  Negative for back pain and joint pain. Neurological:  Negative for dizziness, tingling and headaches. Endo/Heme/Allergies:  Negative for environmental allergies. Psychiatric/Behavioral:  Negative for depression. The patient does not have insomnia. Physical Exam  Vitals and nursing note reviewed. Constitutional:       Appearance: Normal appearance. She is well-developed.       Comments: /74 (BP 1 Location: Left upper arm, BP Patient Position: Sitting, BP Cuff Size: Adult)   Pulse 72   Temp 98.4 °F (36.9 °C)   Resp 12   Ht 5' (1.524 m)   Wt 137 lb (62.1 kg)   LMP 04/15/2002   SpO2 99%   BMI 26.76 kg/m²    HENT:      Right Ear: Tympanic membrane and ear canal normal.      Left Ear: Tympanic membrane and ear canal normal.   Neck:      Thyroid: No thyromegaly. Vascular: No carotid bruit. Cardiovascular:      Rate and Rhythm: Normal rate and regular rhythm. Pulses: Normal pulses. Heart sounds: Normal heart sounds. Pulmonary:      Effort: Pulmonary effort is normal.      Breath sounds: Normal breath sounds. Chest:   Breasts:     Right: Normal.      Left: Normal.   Abdominal:      General: Bowel sounds are normal.      Palpations: Abdomen is soft. There is no mass. Tenderness: There is no abdominal tenderness. Musculoskeletal:         General: No swelling. Normal range of motion. Cervical back: Normal range of motion and neck supple. Right lower leg: No edema. Left lower leg: No edema. Comments: Foot exam done . Normal sensation to PP, LT and vibration. Sensation intact to microfilament testing. Pulses intact. No swelling. No skin lesions or sores noted. No tinea present. Lymphadenopathy:      Cervical: No cervical adenopathy. Upper Body:      Right upper body: No supraclavicular, axillary or pectoral adenopathy. Left upper body: No supraclavicular, axillary or pectoral adenopathy. Skin:     General: Skin is warm and dry. Neurological:      General: No focal deficit present. Mental Status: She is alert and oriented to person, place, and time. Psychiatric:         Mood and Affect: Mood normal.       ASSESSMENT and PLAN  Diagnoses and all orders for this visit:    1. Medicare annual wellness visit, subsequent    2. Essential hypertension  Stable at goal.     3. Type 2 diabetes with nephropathy (HCC)  A1c 5.7%. Continue to monitor. Work on diet and exercise. -     AMB POC HEMOGLOBIN A1C  -     AMB POC GLUCOSE, QUANTITATIVE, BLOOD  -     MICROALBUMIN, UR, RAND W/ MICROALB/CREAT RATIO; Future  -     reduce glipiZIDE SR (GLUCOTROL XL) 5 mg CR tablet; Take 1/2 tab daily.     4. Mixed hyperlipidemia  -     LIPID PANEL; Future    5. Gastroesophageal reflux disease without esophagitis  -     pantoprazole (PROTONIX) 40 mg tablet; Take 1 Tablet by mouth daily as needed (gerd and heartburn). 6. Yeast vaginitis  -     fluconazole (Diflucan) 150 mg tablet; Take 1 tablet by oral route and then repeat in 72 hours    7. Encounter for medication monitoring  -     METABOLIC PANEL, COMPREHENSIVE; Future  -     URINALYSIS W/MICROSCOPIC; Future        reviewed diet, exercise and weight control  cardiovascular risk and specific lipid/LDL goals reviewed  reviewed medications and side effects in detail  specific diabetic recommendations: low cholesterol diet, weight control and daily exercise discussed, all medications, side effects and compliance discussed carefully, foot care discussed and Podiatry visits discussed, annual eye examinations at Ophthalmology discussed, and glycohemoglobin and other lab monitoring discussed      I have discussed diagnosis listed in this note with pt and/or family. I have discussed treatment plans and options and the risk/benefit analysis of those options, including safe use of medications and possible medication side effects. Through the use of shared decision making we have agreed to the above plan. The patient has received an after-visit summary and questions were answered concerning future plans and follow up. Advise pt of any urgent changes then to proceed to the ER.

## 2023-02-03 DIAGNOSIS — K21.9 GASTROESOPHAGEAL REFLUX DISEASE WITHOUT ESOPHAGITIS: ICD-10-CM

## 2023-02-03 RX ORDER — PANTOPRAZOLE SODIUM 40 MG/1
TABLET, DELAYED RELEASE ORAL
Qty: 90 TABLET | Refills: 3 | Status: SHIPPED | OUTPATIENT
Start: 2023-02-03

## 2023-02-08 DIAGNOSIS — E11.21 TYPE 2 DIABETES WITH NEPHROPATHY (HCC): ICD-10-CM

## 2023-02-08 RX ORDER — GLIPIZIDE 5 MG/1
TABLET, FILM COATED, EXTENDED RELEASE ORAL
Qty: 180 TABLET | Refills: 3 | Status: SHIPPED | OUTPATIENT
Start: 2023-02-08

## 2023-07-07 RX ORDER — ATORVASTATIN CALCIUM 20 MG/1
TABLET, FILM COATED ORAL
Qty: 90 TABLET | Refills: 3 | Status: SHIPPED | OUTPATIENT
Start: 2023-07-07

## 2023-07-31 ENCOUNTER — HOSPITAL ENCOUNTER (OUTPATIENT)
Facility: HOSPITAL | Age: 73
Discharge: HOME | End: 2023-08-03
Attending: FAMILY MEDICINE
Payer: MEDICARE

## 2023-07-31 VITALS — BODY MASS INDEX: 22.49 KG/M2 | HEIGHT: 65 IN | WEIGHT: 135 LBS

## 2023-07-31 DIAGNOSIS — Z12.31 VISIT FOR SCREENING MAMMOGRAM: ICD-10-CM

## 2023-07-31 PROCEDURE — 77063 BREAST TOMOSYNTHESIS BI: CPT

## 2023-08-04 DIAGNOSIS — I10 ESSENTIAL (PRIMARY) HYPERTENSION: ICD-10-CM

## 2023-08-04 RX ORDER — OLMESARTAN MEDOXOMIL AND HYDROCHLOROTHIAZIDE 20/12.5 20; 12.5 MG/1; MG/1
TABLET ORAL
Qty: 90 TABLET | Refills: 3 | Status: SHIPPED | OUTPATIENT
Start: 2023-08-04

## 2023-08-04 NOTE — TELEPHONE ENCOUNTER
Last appointment: 1/27/23  Next appointment: 8/15/23  Previous refill encounter(s): 7/8/22 #90 with 3 refills    Requested Prescriptions     Pending Prescriptions Disp Refills    olmesartan-hydroCHLOROthiazide (BENICAR HCT) 20-12.5 MG per tablet [Pharmacy Med Name: OLMESARTAN-HCTZ 20-12.5 MG TAB] 90 tablet 3     Sig: TAKE 1 TABLET BY MOUTH [id number] [address] Starr Tracking Only    Program: Medication Refill  CPA in place:    Recommendation Provided To:    Intervention Detail: New Rx: 1, reason: Patient Preference  Intervention Accepted By:   Elisa Marinelli Closed?:    Time Spent (min): 5

## 2023-08-15 ENCOUNTER — OFFICE VISIT (OUTPATIENT)
Age: 73
End: 2023-08-15
Payer: MEDICARE

## 2023-08-15 VITALS
BODY MASS INDEX: 22.4 KG/M2 | SYSTOLIC BLOOD PRESSURE: 156 MMHG | RESPIRATION RATE: 16 BRPM | TEMPERATURE: 97.5 F | WEIGHT: 134.6 LBS | OXYGEN SATURATION: 96 % | HEART RATE: 73 BPM | DIASTOLIC BLOOD PRESSURE: 80 MMHG

## 2023-08-15 DIAGNOSIS — Z79.899 ENCOUNTER FOR LONG-TERM (CURRENT) USE OF MEDICATIONS: ICD-10-CM

## 2023-08-15 DIAGNOSIS — I10 ESSENTIAL (PRIMARY) HYPERTENSION: Primary | ICD-10-CM

## 2023-08-15 DIAGNOSIS — K21.9 GASTRO-ESOPHAGEAL REFLUX DISEASE WITHOUT ESOPHAGITIS: ICD-10-CM

## 2023-08-15 DIAGNOSIS — E11.21 TYPE 2 DIABETES MELLITUS WITH DIABETIC NEPHROPATHY, WITHOUT LONG-TERM CURRENT USE OF INSULIN (HCC): ICD-10-CM

## 2023-08-15 DIAGNOSIS — B37.31 CANDIDAL VULVITIS: ICD-10-CM

## 2023-08-15 DIAGNOSIS — E78.2 MIXED HYPERLIPIDEMIA: ICD-10-CM

## 2023-08-15 LAB
ALBUMIN SERPL-MCNC: 4.1 G/DL (ref 3.5–5)
ALBUMIN/GLOB SERPL: 1.4 (ref 1.1–2.2)
ALP SERPL-CCNC: 56 U/L (ref 45–117)
ALT SERPL-CCNC: 20 U/L (ref 12–78)
ANION GAP SERPL CALC-SCNC: 6 MMOL/L (ref 5–15)
AST SERPL-CCNC: 15 U/L (ref 15–37)
BILIRUB SERPL-MCNC: 0.7 MG/DL (ref 0.2–1)
BUN SERPL-MCNC: 24 MG/DL (ref 6–20)
BUN/CREAT SERPL: 20 (ref 12–20)
CALCIUM SERPL-MCNC: 10.5 MG/DL (ref 8.5–10.1)
CHLORIDE SERPL-SCNC: 106 MMOL/L (ref 97–108)
CHOLEST SERPL-MCNC: 165 MG/DL
CO2 SERPL-SCNC: 28 MMOL/L (ref 21–32)
CREAT SERPL-MCNC: 1.22 MG/DL (ref 0.55–1.02)
ERYTHROCYTE [DISTWIDTH] IN BLOOD BY AUTOMATED COUNT: 13.2 % (ref 11.5–14.5)
GLOBULIN SER CALC-MCNC: 3 G/DL (ref 2–4)
GLUCOSE SERPL-MCNC: 121 MG/DL (ref 65–100)
GLUCOSE, POC: 109 MG/DL
HBA1C MFR BLD: 5.6 %
HCT VFR BLD AUTO: 35.9 % (ref 35–47)
HDLC SERPL-MCNC: 97 MG/DL
HDLC SERPL: 1.7 (ref 0–5)
HGB BLD-MCNC: 11.3 G/DL (ref 11.5–16)
LDLC SERPL CALC-MCNC: 54.4 MG/DL (ref 0–100)
MCH RBC QN AUTO: 29.9 PG (ref 26–34)
MCHC RBC AUTO-ENTMCNC: 31.5 G/DL (ref 30–36.5)
MCV RBC AUTO: 95 FL (ref 80–99)
NRBC # BLD: 0 K/UL (ref 0–0.01)
NRBC BLD-RTO: 0 PER 100 WBC
PLATELET # BLD AUTO: 261 K/UL (ref 150–400)
PMV BLD AUTO: 10.1 FL (ref 8.9–12.9)
POTASSIUM SERPL-SCNC: 4.4 MMOL/L (ref 3.5–5.1)
PROT SERPL-MCNC: 7.1 G/DL (ref 6.4–8.2)
RBC # BLD AUTO: 3.78 M/UL (ref 3.8–5.2)
SODIUM SERPL-SCNC: 140 MMOL/L (ref 136–145)
TRIGL SERPL-MCNC: 68 MG/DL
VLDLC SERPL CALC-MCNC: 13.6 MG/DL
WBC # BLD AUTO: 4.1 K/UL (ref 3.6–11)

## 2023-08-15 PROCEDURE — 3078F DIAST BP <80 MM HG: CPT | Performed by: FAMILY MEDICINE

## 2023-08-15 PROCEDURE — 1090F PRES/ABSN URINE INCON ASSESS: CPT | Performed by: FAMILY MEDICINE

## 2023-08-15 PROCEDURE — 99214 OFFICE O/P EST MOD 30 MIN: CPT | Performed by: FAMILY MEDICINE

## 2023-08-15 PROCEDURE — 2022F DILAT RTA XM EVC RTNOPTHY: CPT | Performed by: FAMILY MEDICINE

## 2023-08-15 PROCEDURE — 3077F SYST BP >= 140 MM HG: CPT | Performed by: FAMILY MEDICINE

## 2023-08-15 PROCEDURE — 1036F TOBACCO NON-USER: CPT | Performed by: FAMILY MEDICINE

## 2023-08-15 PROCEDURE — G8427 DOCREV CUR MEDS BY ELIG CLIN: HCPCS | Performed by: FAMILY MEDICINE

## 2023-08-15 PROCEDURE — PBSHW AMB POC GLUCOSE BLOOD, BY GLUCOSE MONITORING DEVICE: Performed by: FAMILY MEDICINE

## 2023-08-15 PROCEDURE — G8399 PT W/DXA RESULTS DOCUMENT: HCPCS | Performed by: FAMILY MEDICINE

## 2023-08-15 PROCEDURE — 83036 HEMOGLOBIN GLYCOSYLATED A1C: CPT | Performed by: FAMILY MEDICINE

## 2023-08-15 PROCEDURE — 3017F COLORECTAL CA SCREEN DOC REV: CPT | Performed by: FAMILY MEDICINE

## 2023-08-15 PROCEDURE — 3046F HEMOGLOBIN A1C LEVEL >9.0%: CPT | Performed by: FAMILY MEDICINE

## 2023-08-15 PROCEDURE — 1123F ACP DISCUSS/DSCN MKR DOCD: CPT | Performed by: FAMILY MEDICINE

## 2023-08-15 PROCEDURE — 82962 GLUCOSE BLOOD TEST: CPT | Performed by: FAMILY MEDICINE

## 2023-08-15 PROCEDURE — G8420 CALC BMI NORM PARAMETERS: HCPCS | Performed by: FAMILY MEDICINE

## 2023-08-15 PROCEDURE — PBSHW AMB POC HEMOGLOBIN A1C: Performed by: FAMILY MEDICINE

## 2023-08-15 RX ORDER — FLUCONAZOLE 150 MG/1
150 TABLET ORAL ONCE
Qty: 2 TABLET | Refills: 1 | Status: SHIPPED | OUTPATIENT
Start: 2023-08-15 | End: 2023-08-15

## 2023-08-15 RX ORDER — FAMOTIDINE 40 MG/1
40 TABLET, FILM COATED ORAL DAILY PRN
Qty: 30 TABLET | Refills: 5 | Status: SHIPPED | OUTPATIENT
Start: 2023-08-15

## 2023-08-15 SDOH — ECONOMIC STABILITY: INCOME INSECURITY: HOW HARD IS IT FOR YOU TO PAY FOR THE VERY BASICS LIKE FOOD, HOUSING, MEDICAL CARE, AND HEATING?: NOT HARD AT ALL

## 2023-08-15 SDOH — ECONOMIC STABILITY: HOUSING INSECURITY
IN THE LAST 12 MONTHS, WAS THERE A TIME WHEN YOU DID NOT HAVE A STEADY PLACE TO SLEEP OR SLEPT IN A SHELTER (INCLUDING NOW)?: NO

## 2023-08-15 SDOH — ECONOMIC STABILITY: FOOD INSECURITY: WITHIN THE PAST 12 MONTHS, THE FOOD YOU BOUGHT JUST DIDN'T LAST AND YOU DIDN'T HAVE MONEY TO GET MORE.: NEVER TRUE

## 2023-08-15 SDOH — ECONOMIC STABILITY: FOOD INSECURITY: WITHIN THE PAST 12 MONTHS, YOU WORRIED THAT YOUR FOOD WOULD RUN OUT BEFORE YOU GOT MONEY TO BUY MORE.: NEVER TRUE

## 2023-08-15 ASSESSMENT — ENCOUNTER SYMPTOMS
BLOOD IN STOOL: 0
SHORTNESS OF BREATH: 0
CONSTIPATION: 0
ABDOMINAL PAIN: 0
CHEST TIGHTNESS: 0
RHINORRHEA: 0
COUGH: 0

## 2023-08-15 ASSESSMENT — PATIENT HEALTH QUESTIONNAIRE - PHQ9
SUM OF ALL RESPONSES TO PHQ QUESTIONS 1-9: 0
SUM OF ALL RESPONSES TO PHQ QUESTIONS 1-9: 0
1. LITTLE INTEREST OR PLEASURE IN DOING THINGS: 0
SUM OF ALL RESPONSES TO PHQ QUESTIONS 1-9: 0
SUM OF ALL RESPONSES TO PHQ9 QUESTIONS 1 & 2: 0
2. FEELING DOWN, DEPRESSED OR HOPELESS: 0
SUM OF ALL RESPONSES TO PHQ QUESTIONS 1-9: 0

## 2023-08-15 NOTE — PROGRESS NOTES
Chief Complaint   Patient presents with    Diabetes    Vaginal Itching     C/o increasing vaginal itching, using vagisil vaginal wash with no relief, denies discharge       1. Have you been to the ER, urgent care clinic since your last visit? Hospitalized since your last visit? No    2. Have you seen or consulted any other health care providers outside of the [address] [place] [address] Avenue since your last visit? Include any pap smears or colon screening.  No       Health Maintenance Due   Topic Date Due    Flu vaccine (1) 2023      The patient, Ayala Mooney, identity was verified by name and

## 2023-08-15 NOTE — PROGRESS NOTES
Subjective:      Patient ID: Campos Lord is a 67 y.o. female. HPI  Follow up on chronic medical problems. Overall feeling well. Overall feeling well. HTN follow up:  Compliant w/ meds, low salt diet, and exercise. Tolerating benicar HCT. No home bp monitoring. No swelling, headache or dizziness. No chest pain, SOB, palpitations. DM type II follow up:  Compliant w/ meds, diabetic diet, and exercise. Has not been checking her BS over the past several months. Denies any tingling sensation. No polyuria and polydipsia. No blurred vision. No significant weight changes. Hypercholesterolemia follow up:  Compliant w/ low fat, low cholesterol diet. Has been taking the Lipitor nightly. Exercising some but plans to increased her exercise. No muscle nor no skin discoloration. Patient fasting today. HM:  Mammogram 7/31/2023  Colonoscopy 1/12/2021 by Dr. Damon Soriano repeat in 5 years. Past Medical History:   Diagnosis Date    Diabetes ([address]) 2012    HTN (hypertension) 3/16/2010    Hypercholesterolemia     Sarcoidosis 1980s     Past Surgical History:   Procedure Laterality Date    COLONOSCOPY N/A 1/12/2021    COLONOSCOPY performed by Tiffany Loo MD at [facility] ENDOSCOPY    COLONOSCOPY  7/16/2007    repeat in 2010    COLONOSCOPY W/BIOPSY SINGLE/MULTIPLE  07/2010    repear in 5yr /dr. Luz Kulkarni.     ORTHOPEDIC SURGERY  09/2009    repair tear left knee     Current Outpatient Medications   Medication Sig Dispense Refill    olmesartan-hydroCHLOROthiazide (BENICAR HCT) 20-12.5 MG per tablet TAKE 1 TABLET BY MOUTH EVERY DAY 90 tablet 3    atorvastatin (LIPITOR) 20 MG tablet TAKE 1 TABLET BY MOUTH EVERY DAY 90 tablet 3    estrogens conjugated (PREMARIN) 0.625 MG/GM CREA vaginal cream Place vaginally      fluconazole (DIFLUCAN) 150 MG tablet Take 1 tablet by oral route and then repeat in 72 hours      glipiZIDE (GLUCOTROL XL) 5 MG extended release tablet TAKE 2 TABLETS EVERY DAY IN THE MORNING PRIOR TO

## 2023-09-12 DIAGNOSIS — E11.21 TYPE 2 DIABETES MELLITUS WITH DIABETIC NEPHROPATHY (HCC): ICD-10-CM

## 2023-09-13 RX ORDER — SITAGLIPTIN AND METFORMIN HYDROCHLORIDE 1000; 50 MG/1; MG/1
TABLET, FILM COATED, EXTENDED RELEASE ORAL
Qty: 180 TABLET | Refills: 3 | Status: SHIPPED | OUTPATIENT
Start: 2023-09-13

## 2023-09-13 NOTE — TELEPHONE ENCOUNTER
Last appointment: 8/15/23  Next appointment: 10/23/23  Previous refill encounter(s): 7/8/22 #180 with 3 refills    Requested Prescriptions     Pending Prescriptions Disp Refills    JANUMET XR  MG TB24 per extended release tablet [Pharmacy Med Name: JANUMET XR 50-1,000 MG TABLET] 180 tablet 3     Sig: TAKE 2 TABS BY MOUTH DAILY (WITH BREAKFAST). For Pharmacy Admin Tracking Only    Program: Medication Refill  CPA in place:    Recommendation Provided To:    Intervention Detail: New Rx: 1, reason: Patient Preference  Intervention Accepted By:   Oscar Boyle Closed?:    Time Spent (min): 5

## 2023-10-23 ENCOUNTER — OFFICE VISIT (OUTPATIENT)
Age: 73
End: 2023-10-23
Payer: MEDICARE

## 2023-10-23 VITALS
HEIGHT: 65 IN | OXYGEN SATURATION: 99 % | BODY MASS INDEX: 22.33 KG/M2 | SYSTOLIC BLOOD PRESSURE: 162 MMHG | WEIGHT: 134 LBS | RESPIRATION RATE: 18 BRPM | DIASTOLIC BLOOD PRESSURE: 75 MMHG | HEART RATE: 69 BPM | TEMPERATURE: 98.2 F

## 2023-10-23 DIAGNOSIS — E78.2 MIXED HYPERLIPIDEMIA: ICD-10-CM

## 2023-10-23 DIAGNOSIS — Z23 ENCOUNTER FOR IMMUNIZATION: ICD-10-CM

## 2023-10-23 DIAGNOSIS — E11.21 TYPE 2 DIABETES MELLITUS WITH DIABETIC NEPHROPATHY, WITHOUT LONG-TERM CURRENT USE OF INSULIN (HCC): ICD-10-CM

## 2023-10-23 DIAGNOSIS — L65.9 ALOPECIA: ICD-10-CM

## 2023-10-23 DIAGNOSIS — Z79.899 ENCOUNTER FOR LONG-TERM (CURRENT) USE OF MEDICATIONS: ICD-10-CM

## 2023-10-23 DIAGNOSIS — K21.9 GASTRO-ESOPHAGEAL REFLUX DISEASE WITHOUT ESOPHAGITIS: ICD-10-CM

## 2023-10-23 DIAGNOSIS — I10 ESSENTIAL (PRIMARY) HYPERTENSION: Primary | ICD-10-CM

## 2023-10-23 LAB — GLUCOSE, POC: 132 MG/DL

## 2023-10-23 PROCEDURE — G8420 CALC BMI NORM PARAMETERS: HCPCS | Performed by: FAMILY MEDICINE

## 2023-10-23 PROCEDURE — 3077F SYST BP >= 140 MM HG: CPT | Performed by: FAMILY MEDICINE

## 2023-10-23 PROCEDURE — G8399 PT W/DXA RESULTS DOCUMENT: HCPCS | Performed by: FAMILY MEDICINE

## 2023-10-23 PROCEDURE — 82962 GLUCOSE BLOOD TEST: CPT | Performed by: FAMILY MEDICINE

## 2023-10-23 PROCEDURE — 3078F DIAST BP <80 MM HG: CPT | Performed by: FAMILY MEDICINE

## 2023-10-23 PROCEDURE — 1090F PRES/ABSN URINE INCON ASSESS: CPT | Performed by: FAMILY MEDICINE

## 2023-10-23 PROCEDURE — 3046F HEMOGLOBIN A1C LEVEL >9.0%: CPT | Performed by: FAMILY MEDICINE

## 2023-10-23 PROCEDURE — 3017F COLORECTAL CA SCREEN DOC REV: CPT | Performed by: FAMILY MEDICINE

## 2023-10-23 PROCEDURE — 1123F ACP DISCUSS/DSCN MKR DOCD: CPT | Performed by: FAMILY MEDICINE

## 2023-10-23 PROCEDURE — 99214 OFFICE O/P EST MOD 30 MIN: CPT | Performed by: FAMILY MEDICINE

## 2023-10-23 PROCEDURE — PBSHW AMB POC GLUCOSE BLOOD, BY GLUCOSE MONITORING DEVICE: Performed by: FAMILY MEDICINE

## 2023-10-23 PROCEDURE — 2022F DILAT RTA XM EVC RTNOPTHY: CPT | Performed by: FAMILY MEDICINE

## 2023-10-23 PROCEDURE — G8484 FLU IMMUNIZE NO ADMIN: HCPCS | Performed by: FAMILY MEDICINE

## 2023-10-23 PROCEDURE — 1036F TOBACCO NON-USER: CPT | Performed by: FAMILY MEDICINE

## 2023-10-23 PROCEDURE — 90694 VACC AIIV4 NO PRSRV 0.5ML IM: CPT | Performed by: FAMILY MEDICINE

## 2023-10-23 PROCEDURE — PBSHW INFLUENZA, FLUAD, (AGE 65 Y+), IM, PF, 0.5 ML: Performed by: FAMILY MEDICINE

## 2023-10-23 PROCEDURE — G8427 DOCREV CUR MEDS BY ELIG CLIN: HCPCS | Performed by: FAMILY MEDICINE

## 2023-10-23 RX ORDER — OLMESARTAN MEDOXOMIL AND HYDROCHLOROTHIAZIDE 20/12.5 20; 12.5 MG/1; MG/1
2 TABLET ORAL DAILY
Qty: 180 TABLET | Refills: 3 | Status: SHIPPED | OUTPATIENT
Start: 2023-10-23

## 2023-10-23 SDOH — ECONOMIC STABILITY: FOOD INSECURITY: WITHIN THE PAST 12 MONTHS, THE FOOD YOU BOUGHT JUST DIDN'T LAST AND YOU DIDN'T HAVE MONEY TO GET MORE.: NEVER TRUE

## 2023-10-23 SDOH — ECONOMIC STABILITY: INCOME INSECURITY: HOW HARD IS IT FOR YOU TO PAY FOR THE VERY BASICS LIKE FOOD, HOUSING, MEDICAL CARE, AND HEATING?: NOT HARD AT ALL

## 2023-10-23 SDOH — ECONOMIC STABILITY: FOOD INSECURITY: WITHIN THE PAST 12 MONTHS, YOU WORRIED THAT YOUR FOOD WOULD RUN OUT BEFORE YOU GOT MONEY TO BUY MORE.: NEVER TRUE

## 2023-10-23 ASSESSMENT — PATIENT HEALTH QUESTIONNAIRE - PHQ9
SUM OF ALL RESPONSES TO PHQ QUESTIONS 1-9: 0
SUM OF ALL RESPONSES TO PHQ QUESTIONS 1-9: 0
SUM OF ALL RESPONSES TO PHQ9 QUESTIONS 1 & 2: 0
2. FEELING DOWN, DEPRESSED OR HOPELESS: 0
1. LITTLE INTEREST OR PLEASURE IN DOING THINGS: 0
SUM OF ALL RESPONSES TO PHQ QUESTIONS 1-9: 0
SUM OF ALL RESPONSES TO PHQ QUESTIONS 1-9: 0

## 2023-10-23 ASSESSMENT — ENCOUNTER SYMPTOMS
BLOOD IN STOOL: 0
RHINORRHEA: 0
COUGH: 0
CONSTIPATION: 0
ABDOMINAL PAIN: 0
CHEST TIGHTNESS: 0
SHORTNESS OF BREATH: 0

## 2023-10-23 NOTE — PROGRESS NOTES
Subjective:      Patient ID: Nohemy Pelaez is a 67 y.o. female. HPI  Follow up on chronic medical problems. Overall feeling well. HTN follow up:  Compliant w/ meds, low salt diet, and exercise. Tolerating benicar HCT. No home bp monitoring. No swelling, headache or dizziness. No chest pain, SOB, palpitations. DM type II follow up:  Compliant w/ meds, diabetic diet, and exercise. Has not been checking her BS over the past several months. Denies any tingling sensation. No polyuria and polydipsia. No blurred vision. No significant weight changes. Hypercholesterolemia follow up:  Compliant w/ low fat, low cholesterol diet. Has been taking the Lipitor nightly. Exercising some but plans to increased her exercise. No muscle nor no skin discoloration. Patient fasting today. HM:  Mammogram 7/31/2023  Colonoscopy 1/12/2021 by Dr. Nathan Early repeat in 5 years. Past Medical History:   Diagnosis Date    Diabetes ([address]) 2012    HTN (hypertension) 3/16/2010    Hypercholesterolemia     Sarcoidosis 1980s     Past Surgical History:   Procedure Laterality Date    COLONOSCOPY N/A 1/12/2021    COLONOSCOPY performed by Allison Wan MD at [facility] ENDOSCOPY    COLONOSCOPY  7/16/2007    repeat in 2010    COLONOSCOPY W/BIOPSY SINGLE/MULTIPLE  07/2010    repear in 5yr /dr. Nathan Rodriguez. ORTHOPEDIC SURGERY  09/2009    repair tear left knee     Current Outpatient Medications   Medication Sig Dispense Refill    JANUMET XR  MG TB24 per extended release tablet TAKE 2 TABS BY MOUTH DAILY (WITH BREAKFAST).  180 tablet 3    famotidine (PEPCID) 40 MG tablet Take 1 tablet by mouth daily as needed (heartburn or reflux) 30 tablet 5    olmesartan-hydroCHLOROthiazide (BENICAR HCT) 20-12.5 MG per tablet TAKE 1 TABLET BY MOUTH EVERY DAY 90 tablet 3    atorvastatin (LIPITOR) 20 MG tablet TAKE 1 TABLET BY MOUTH EVERY DAY 90 tablet 3    pantoprazole (PROTONIX) 40 MG tablet TAKE 1 TABLET BY MOUTH EVERY DAY       No current

## 2023-10-23 NOTE — PROGRESS NOTES
Chief Complaint   Patient presents with    Follow-up       1. \"Have you been to the ER, urgent care clinic since your last visit? Hospitalized since your last visit? \" No    2. \"Have you seen or consulted any other health care providers outside of the [address] [place] [address] since your last visit? \" No     3. For patients aged 43-73: Has the patient had a colonoscopy / FIT/ Cologuard? Yes      If the patient is female:    4. For patients aged 43-66: Has the patient had a mammogram within the past 2 years? Yes      5. For patients aged 21-65: Has the patient had a pap smear?  N/A     Health Maintenance Due   Topic Date Due    Hepatitis B vaccine (1 of 3 - Risk 3-dose series) Never done    COVID-19 Vaccine (6 - Pfizer series) 02/17/2023    Flu vaccine (1) 08/01/2023

## 2024-01-02 NOTE — MR AVS SNAPSHOT
Subjective Patient feels better.  Still not able to move left arm very much.  He denies any chest pain or shortness of breath.  No other new complaints.    Objective     I/O's    Intake/Output Summary (Last 24 hours) at 1/2/2024 1354  Last data filed at 1/2/2024 0800  Gross per 24 hour   Intake --   Output 1350 ml   Net -1350 ml         Last Recorded Vitals  Blood pressure (!) 140/88, pulse (!) 107, temperature 99.5 °F (37.5 °C), temperature source Temporal, resp. rate (!) 24, height 5' 9\" (1.753 m), weight 64.6 kg (142 lb 6.7 oz), SpO2 98 %.  Body mass index is 21.03 kg/m².    Physical Exam  Vitals and nursing note reviewed.   Constitutional:       Appearance: Normal appearance.   HENT:      Head: Normocephalic and atraumatic.      Nose: Nose normal.      Mouth/Throat:      Mouth: Mucous membranes are moist.      Neck: Normal range of motion and neck supple.   Eyes:      Extraocular Movements: Extraocular movements intact.      Pupils: Pupils are equal, round, and reactive to light.   Cardiovascular:      Rate and Rhythm: Normal rate and regular rhythm.      Pulses: Normal pulses.      Heart sounds: Normal heart sounds.   Pulmonary:      Effort: Pulmonary effort is normal.      Breath sounds: Normal breath sounds.   Abdominal:      General: Abdomen is flat.      Palpations: Abdomen is soft.   Musculoskeletal:         General: Normal range of motion.   Skin:     General: Skin is warm and dry.   Neurological:      Mental Status: He is alert and oriented to person, place, and time.      Motor: Weakness present.      Gait: Gait abnormal.      Comments: Cranial nerves II to XII are grossly intact.  Patient moves the right side of his body without difficulty.  The left side of the body repeat results and remarkable improvements in the upper extremity function of being able to lift the arm off the bed along with flexion and extension and fine manipulation of the fingers.  Patient is still flaccid in the left leg.  Visit Information Date & Time Provider Department Dept. Phone Encounter #  
 11/3/2017  8:30 AM Juan Antonio Hilliard [phone] [id number] Follow-up Instructions Return in about 3 months (around 2/14/2018). Follow-up and Disposition History Your Appointments 2/23/2018  8:15 AM  
ROUTINE CARE with Earl Caldwell MD  
[facility] [address] [facility]) Appt Note: f/u  
 [address] [facility] [place]  [address] [phone] [address] [place] [address] [address] Upcoming Health Maintenance Date Due HEMOGLOBIN A1C Q6M 9/6/2017 MEDICARE YEARLY EXAM 11/23/2017 LIPID PANEL Q1 3/6/2018 GLAUCOMA SCREENING Q2Y 9/12/2018 BREAST CANCER SCRN MAMMOGRAM 9/5/2019 COLONOSCOPY 11/10/2020 DTaP/Tdap/Td series (2 - Td) 11/22/2026 Allergies as of 11/3/2017  Review Complete On: 11/3/2017 By: Earl Caldwell MD  
  
 Severity Noted Reaction Type Reactions Azithromycin  05/21/2012    Other (comments) Hallucinations Current Immunizations  Reviewed on 3/6/2017 Name Date Influenza High Dose Vaccine PF 11/3/2017, 11/22/2016 Influenza Vaccine (Quad) PF 11/25/2015 Influenza Vaccine PF 10/11/2013 Influenza Vaccine Split 11/12/2012, 9/21/2011 Pneumococcal Polysaccharide (PPSV-23) 12/2/2014 Tdap 8/19/2015 Not reviewed this visit You Were Diagnosed With   
  
 Codes Comments Essential hypertension    -  Primary ICD-10-CM: I10 
ICD-9-CM: 401.9 Type 2 diabetes mellitus without complication, without long-term current use of insulin (HCC)     ICD-10-CM: E11.9 ICD-9-CM: 250.00 Mixed hyperlipidemia     ICD-10-CM: E78.2 ICD-9-CM: 272.2 Encounter for medication monitoring     ICD-10-CM: Z51.81 
ICD-9-CM: V58.83 Encounter for immunization     ICD-10-CM: Q60 ICD-9-CM: V03.89 Non morbid obesity     ICD-10-CM: E66.9         Labs       Imaging      Assessment & Plan     1) Acute Right Sided CVA: We appreciate the input of neurology.  Patient is clearly had dramatic improvements in the left arm.  Coumadin continues to be held blood pressure meds can be held patient has SCDs for DVT prophylaxis.  Patient will have subsequent speech OT and PT therapy.    2) Hypertension: BP meds per neurology recommendation.    3) A fib: Currently rate controlled.        DVT Prophylaxis  SCD    Smoking Cessation  Counseling given: Not Answered            Acute right-sided CVA.  Clinically improving with increased movement in his left arm.  Neurology consultation noted and appreciated.  Holding anticoagulation until January 4, 2024 to minimize risk of hemorrhagic conversion.  Continue PT and OT.  Resume blood pressure medications when okay with neurology for resumption.  Patient's atrial fibrillation is rate controlled at this time.     ICD-9-CM: 278.00 Vitals BP Pulse Temp Resp Height(growth percentile) Weight(growth percentile) 140/75 (BP 1 Location: Right arm, BP Patient Position: Sitting) 72 96.5 °F (35.8 °C) (Oral) 16 5' 1\" (1.549 m) 165 lb 12.8 oz (75.2 kg) LMP SpO2 BMI OB Status Smoking Status 04/15/2002 97% 31.33 kg/m2 Postmenopausal Never Smoker BMI and BSA Data Body Mass Index Body Surface Area  
 31.33 kg/m 2 1.8 m 2 Preferred Pharmacy Pharmacy Name Phone [id number] [facility] [phone] Your Updated Medication List  
  
   
This list is accurate as of: 11/3/17 10:07 AM.  Always use your most recent med list.  
  
  
  
  
 atorvastatin 20 mg tablet Commonly known as:  LIPITOR Take 1 Tab by mouth daily. glipiZIDE SR 5 mg CR tablet Commonly known as:  GLUCOTROL XL  
TAKE 2 TABLETS EVERY DAY IN THE MORNING PRIOR TO BREAKFAST  
  
 glucose blood VI test strips strip Commonly known as:  Ascensia CONTOUR  
#100 As directed  
  
 losartan-hydroCHLOROthiazide 50-12.5 mg per tablet Commonly known as:  HYZAAR  
TAKE 1 TABLET BY MOUTH EVERY DAY  
  
 pantoprazole 40 mg tablet Commonly known as:  PROTONIX Take 1 Tab by mouth daily. SITagliptin-metFORMIN 100-1,000 mg Tm24 Commonly known as:  JANUMET XR Take 1 Tab by mouth daily. Prescriptions Printed Refills SITagliptin-metFORMIN (JANUMET XR) 100-1,000 mg TM24 6 Sig: Take 1 Tab by mouth daily. Class: Print Route: Oral  
  
We Performed the Following AMB POC GLUCOSE, QUANTITATIVE, BLOOD [23295 CPT(R)] AMB POC HEMOGLOBIN A1C [41120 CPT(R)] AMB POC URINALYSIS DIP STICK AUTO W/O MICRO [69909 CPT(R)] INFLUENZA VIRUS VACCINE, HIGH DOSE SEASONAL, PRESERVATIVE FREE [45781 CPT(R)] LIPID PANEL [23572 CPT(R)] METABOLIC PANEL, COMPREHENSIVE [36827 CPT(R)] MICROALBUMIN, UR, RAND W/ MICROALBUMIN/CREA RATIO [id number] CPT(R)] HI IMMUNIZ ADMIN,1 SINGLE/COMB VAC/TOXOID [id number] CPT(R)] Follow-up Instructions Return in about 3 months (around 2/14/2018). Introducing [facility]! Wyatt Bliss introduces [employer] patient portal. Now you can access parts of your medical record, email your doctor's office, and request medication refills online. 1. In your internet browser, go to [email or web address] 2. Click on the First Time User? Click Here link in the Sign In box. You will see the New Member Sign Up page. 3. Enter your [employer] Access Code exactly as it appears below. You will not need to use this code after youve completed the sign-up process. If you do not sign up before the expiration date, you must request a new code. · [employer] Access Code: [email or web address] Expires: 2/1/2018  8:48 AM 
 
4. Enter the last four digits of your Social Security Number (xxxx) and Date of Birth (mm/dd/yyyy) as indicated and click Submit. You will be taken to the next sign-up page. 5. Create a [employer] ID. This will be your [employer] login ID and cannot be changed, so think of one that is secure and easy to remember. 6. Create a [employer] password. You can change your password at any time. 7. Enter your Password Reset Question and Answer. This can be used at a later time if you forget your password. 8. Enter your e-mail address. You will receive e-mail notification when new information is available in [address] 9. Click Sign Up. You can now view and download portions of your medical record. 10. Click the Download Summary menu link to download a portable copy of your medical information. If you have questions, please visit the Frequently Asked Questions section of the [employer] website. Remember, [employer] is NOT to be used for urgent needs. For medical emergencies, dial 911. Now available from your iPhone and Android! Please provide this summary of care documentation to your next provider. Your primary care clinician is listed as Celsa Rape. If you have any questions after today's visit, please call [phone].

## 2024-01-09 ENCOUNTER — OFFICE VISIT (OUTPATIENT)
Age: 74
End: 2024-01-09
Payer: MEDICARE

## 2024-01-09 VITALS
BODY MASS INDEX: 25.68 KG/M2 | HEIGHT: 60 IN | RESPIRATION RATE: 16 BRPM | TEMPERATURE: 98.4 F | OXYGEN SATURATION: 98 % | HEART RATE: 70 BPM | WEIGHT: 130.8 LBS | DIASTOLIC BLOOD PRESSURE: 78 MMHG | SYSTOLIC BLOOD PRESSURE: 162 MMHG

## 2024-01-09 DIAGNOSIS — K21.9 GASTRO-ESOPHAGEAL REFLUX DISEASE WITHOUT ESOPHAGITIS: ICD-10-CM

## 2024-01-09 DIAGNOSIS — Z79.899 ENCOUNTER FOR LONG-TERM (CURRENT) USE OF MEDICATIONS: ICD-10-CM

## 2024-01-09 DIAGNOSIS — I10 ESSENTIAL (PRIMARY) HYPERTENSION: ICD-10-CM

## 2024-01-09 DIAGNOSIS — Z00.00 MEDICARE ANNUAL WELLNESS VISIT, SUBSEQUENT: Primary | ICD-10-CM

## 2024-01-09 DIAGNOSIS — E11.21 TYPE 2 DIABETES MELLITUS WITH DIABETIC NEPHROPATHY, WITHOUT LONG-TERM CURRENT USE OF INSULIN (HCC): ICD-10-CM

## 2024-01-09 DIAGNOSIS — E78.2 MIXED HYPERLIPIDEMIA: ICD-10-CM

## 2024-01-09 LAB
APPEARANCE UR: CLEAR
BACTERIA URNS QL MICRO: NEGATIVE /HPF
BILIRUB UR QL: NEGATIVE
COLOR UR: NORMAL
EPITH CASTS URNS QL MICRO: NORMAL /LPF
GLUCOSE UR STRIP.AUTO-MCNC: NEGATIVE MG/DL
GLUCOSE, POC: 108 MG/DL
HBA1C MFR BLD: 6.5 %
HGB UR QL STRIP: NEGATIVE
HYALINE CASTS URNS QL MICRO: NORMAL /LPF (ref 0–5)
KETONES UR QL STRIP.AUTO: NEGATIVE MG/DL
LEUKOCYTE ESTERASE UR QL STRIP.AUTO: NEGATIVE
NITRITE UR QL STRIP.AUTO: NEGATIVE
PH UR STRIP: 5.5 (ref 5–8)
PROT UR STRIP-MCNC: NEGATIVE MG/DL
RBC #/AREA URNS HPF: NORMAL /HPF (ref 0–5)
SP GR UR REFRACTOMETRY: 1.02 (ref 1–1.03)
UROBILINOGEN UR QL STRIP.AUTO: 0.2 EU/DL (ref 0.2–1)
WBC URNS QL MICRO: NORMAL /HPF (ref 0–4)

## 2024-01-09 PROCEDURE — 3017F COLORECTAL CA SCREEN DOC REV: CPT | Performed by: FAMILY MEDICINE

## 2024-01-09 PROCEDURE — 1090F PRES/ABSN URINE INCON ASSESS: CPT | Performed by: FAMILY MEDICINE

## 2024-01-09 PROCEDURE — 99214 OFFICE O/P EST MOD 30 MIN: CPT | Performed by: FAMILY MEDICINE

## 2024-01-09 PROCEDURE — 1036F TOBACCO NON-USER: CPT | Performed by: FAMILY MEDICINE

## 2024-01-09 PROCEDURE — G8419 CALC BMI OUT NRM PARAM NOF/U: HCPCS | Performed by: FAMILY MEDICINE

## 2024-01-09 PROCEDURE — 83036 HEMOGLOBIN GLYCOSYLATED A1C: CPT | Performed by: FAMILY MEDICINE

## 2024-01-09 PROCEDURE — PBSHW AMB POC HEMOGLOBIN A1C: Performed by: FAMILY MEDICINE

## 2024-01-09 PROCEDURE — 2022F DILAT RTA XM EVC RTNOPTHY: CPT | Performed by: FAMILY MEDICINE

## 2024-01-09 PROCEDURE — G8427 DOCREV CUR MEDS BY ELIG CLIN: HCPCS | Performed by: FAMILY MEDICINE

## 2024-01-09 PROCEDURE — 3046F HEMOGLOBIN A1C LEVEL >9.0%: CPT | Performed by: FAMILY MEDICINE

## 2024-01-09 PROCEDURE — 3078F DIAST BP <80 MM HG: CPT | Performed by: FAMILY MEDICINE

## 2024-01-09 PROCEDURE — G0439 PPPS, SUBSEQ VISIT: HCPCS | Performed by: FAMILY MEDICINE

## 2024-01-09 PROCEDURE — PBSHW AMB POC GLUCOSE BLOOD, BY GLUCOSE MONITORING DEVICE: Performed by: FAMILY MEDICINE

## 2024-01-09 PROCEDURE — 3077F SYST BP >= 140 MM HG: CPT | Performed by: FAMILY MEDICINE

## 2024-01-09 PROCEDURE — 1123F ACP DISCUSS/DSCN MKR DOCD: CPT | Performed by: FAMILY MEDICINE

## 2024-01-09 PROCEDURE — G8399 PT W/DXA RESULTS DOCUMENT: HCPCS | Performed by: FAMILY MEDICINE

## 2024-01-09 PROCEDURE — G8484 FLU IMMUNIZE NO ADMIN: HCPCS | Performed by: FAMILY MEDICINE

## 2024-01-09 PROCEDURE — 82962 GLUCOSE BLOOD TEST: CPT | Performed by: FAMILY MEDICINE

## 2024-01-09 RX ORDER — AMLODIPINE BESYLATE 5 MG/1
5 TABLET ORAL DAILY
Qty: 30 TABLET | Refills: 5 | Status: SHIPPED | OUTPATIENT
Start: 2024-01-09 | End: 2024-01-09 | Stop reason: SDUPTHER

## 2024-01-09 RX ORDER — AMLODIPINE BESYLATE 5 MG/1
5 TABLET ORAL DAILY
Qty: 30 TABLET | Refills: 5 | Status: SHIPPED | OUTPATIENT
Start: 2024-01-09

## 2024-01-09 RX ORDER — FERROUS GLUCONATE 324(38)MG
324 TABLET ORAL
COMMUNITY

## 2024-01-09 SDOH — ECONOMIC STABILITY: FOOD INSECURITY: WITHIN THE PAST 12 MONTHS, THE FOOD YOU BOUGHT JUST DIDN'T LAST AND YOU DIDN'T HAVE MONEY TO GET MORE.: NEVER TRUE

## 2024-01-09 SDOH — ECONOMIC STABILITY: FOOD INSECURITY: WITHIN THE PAST 12 MONTHS, YOU WORRIED THAT YOUR FOOD WOULD RUN OUT BEFORE YOU GOT MONEY TO BUY MORE.: NEVER TRUE

## 2024-01-09 SDOH — ECONOMIC STABILITY: INCOME INSECURITY: HOW HARD IS IT FOR YOU TO PAY FOR THE VERY BASICS LIKE FOOD, HOUSING, MEDICAL CARE, AND HEATING?: NOT HARD AT ALL

## 2024-01-09 ASSESSMENT — PATIENT HEALTH QUESTIONNAIRE - PHQ9
SUM OF ALL RESPONSES TO PHQ9 QUESTIONS 1 & 2: 0
SUM OF ALL RESPONSES TO PHQ QUESTIONS 1-9: 0
2. FEELING DOWN, DEPRESSED OR HOPELESS: 0
1. LITTLE INTEREST OR PLEASURE IN DOING THINGS: 0
SUM OF ALL RESPONSES TO PHQ QUESTIONS 1-9: 0

## 2024-01-09 ASSESSMENT — ENCOUNTER SYMPTOMS
CONSTIPATION: 0
SHORTNESS OF BREATH: 0
BLOOD IN STOOL: 0
RHINORRHEA: 0
CHEST TIGHTNESS: 0
COUGH: 0
ABDOMINAL PAIN: 0

## 2024-01-09 ASSESSMENT — LIFESTYLE VARIABLES
HOW MANY STANDARD DRINKS CONTAINING ALCOHOL DO YOU HAVE ON A TYPICAL DAY: PATIENT DOES NOT DRINK
HOW OFTEN DO YOU HAVE A DRINK CONTAINING ALCOHOL: NEVER

## 2024-01-09 NOTE — PATIENT INSTRUCTIONS
you call 911, the  may tell you to chew 1 adult-strength or 2 to 4 low-dose aspirin. Wait for an ambulance. Do not try to drive yourself.  Watch closely for changes in your health, and be sure to contact your doctor if you have any problems.  Where can you learn more?  Go to https://www.Evermind.net/patientEd and enter F075 to learn more about \"A Healthy Heart: Care Instructions.\"  Current as of: June 25, 2023               Content Version: 13.9  © 8526-7285 [employer].   Care instructions adapted under license by [employer]. If you have questions about a medical condition or this instruction, always ask your healthcare professional. [employer] disclaims any warranty or liability for your use of this information.      Personalized Preventive Plan for Li Ramírez - 1/9/2024  Medicare offers a range of preventive health benefits. Some of the tests and screenings are paid in full while other may be subject to a deductible, co-insurance, and/or copay.    Some of these benefits include a comprehensive review of your medical history including lifestyle, illnesses that may run in your family, and various assessments and screenings as appropriate.    After reviewing your medical record and screening and assessments performed today your provider may have ordered immunizations, labs, imaging, and/or referrals for you.  A list of these orders (if applicable) as well as your Preventive Care list are included within your After Visit Summary for your review.    Other Preventive Recommendations:    A preventive eye exam performed by an eye specialist is recommended every 1-2 years to screen for glaucoma; cataracts, macular degeneration, and other eye disorders.  A preventive dental visit is recommended every 6 months.  Try to get at least 150 minutes of exercise per week or 10,000 steps per day on a pedometer .  Order or download the FREE \"Exercise & Physical Activity: Your Everyday

## 2024-01-09 NOTE — PROGRESS NOTES
Chief Complaint   Patient presents with    Medicare AWV       \"Have you been to the ER, urgent care clinic since your last visit?  Hospitalized since your last visit?\"    NO    “Have you seen or consulted any other health care providers outside of [facility] since your last visit?”    NO             Vitals:    24 0847   BP: (!) 163/69   Pulse:    Resp:    Temp:    SpO2:        Health Maintenance Due   Topic Date Due    Respiratory Syncytial Virus (RSV) Pregnant or age 60 yrs+ (1 - 1-dose 60+ series) Never done    COVID-19 Vaccine ( season) 2023    Diabetic Alb to Cr ratio (uACR) test  2024        The patient, Li Ramírez, identity was verified by name and .   
surfaces or shower bars or grab bars in your shower or bathtub?: (!) No  Interventions:  See AVS for additional education material     Advanced Directives:  Do you have a Living Will?: (!) No    Intervention:  has NO advanced directive - information provided             Objective   Vitals:    01/09/24 0844 01/09/24 0847   BP: (!) 163/73 (!) 163/69   Site: Right Upper Arm Right Upper Arm   Position: Sitting Sitting   Cuff Size: Large Adult Large Adult   Pulse: 70    Resp: 16    Temp: 98.4 °F (36.9 °C)    TempSrc: Oral    SpO2: 98%    Weight: 59.3 kg (130 lb 12.8 oz)    Height: 1.524 m (5')       Body mass index is 25.55 kg/m².            Allergies   Allergen Reactions    Azithromycin Other (See Comments)     Hallucinations     Prior to Visit Medications    Medication Sig Taking? Authorizing Provider   Apoaequorin (PREVAGEN PO) Take by mouth Yes Rohan Membreno MD   ferrous gluconate (FERGON) 324 (38 Fe) MG tablet Take 1 tablet by mouth daily (with breakfast) Yes Rohan Membreno MD   olmesartan-hydroCHLOROthiazide (BENICAR HCT) 20-12.5 MG per tablet Take 2 tablets by mouth daily Yes Lyla Torres MD   JANUMET XR  MG TB24 per extended release tablet TAKE 2 TABS BY MOUTH DAILY (WITH BREAKFAST). Yes Lyla Torres MD   famotidine (PEPCID) 40 MG tablet Take 1 tablet by mouth daily as needed (heartburn or reflux) Yes Lyla Torres MD   atorvastatin (LIPITOR) 20 MG tablet TAKE 1 TABLET BY MOUTH EVERY DAY Yes Lyla Torres MD   pantoprazole (PROTONIX) 40 MG tablet TAKE 1 TABLET BY MOUTH EVERY DAY  Patient not taking: Reported on 1/9/2024  Automatic Reconciliation, Ar       CareTeam (Including outside providers/suppliers regularly involved in providing care):   Patient Care Team:  Lyla Torres MD as PCP - General  Lyla Torres MD as PCP - Empaneled Provider     Reviewed and updated this visit:  Tobacco  Allergies  Meds  Problems  Med Hx 
diet, weight control and daily exercise discussed, all medications, side effects and compliance discussed carefully, foot care discussed and Podiatry visits discussed, annual eye examinations at Ophthalmology discussed, and glycohemoglobin and other lab monitoring discussed      I have discussed diagnosis listed in this note with pt and/or family. I have discussed treatment plans and options and the risk/benefit analysis of those options, including safe use of medications and possible medication side effects.   Through the use of shared decision making we have agreed to the above plan. The patient has received an after-visit summary and questions were answered concerning future plans and follow up.  Advise pt of any urgent changes then to proceed to the ER.            Lyla Torres MD

## 2024-01-10 LAB
ALBUMIN SERPL-MCNC: 3.8 G/DL (ref 3.5–5)
ALBUMIN/GLOB SERPL: 1 (ref 1.1–2.2)
ALP SERPL-CCNC: 76 U/L (ref 45–117)
ALT SERPL-CCNC: 17 U/L (ref 12–78)
ANION GAP SERPL CALC-SCNC: 3 MMOL/L (ref 5–15)
AST SERPL-CCNC: ABNORMAL U/L (ref 15–37)
BILIRUB SERPL-MCNC: 0.7 MG/DL (ref 0.2–1)
BUN SERPL-MCNC: 22 MG/DL (ref 6–20)
BUN/CREAT SERPL: 18 (ref 12–20)
CALCIUM SERPL-MCNC: 10 MG/DL (ref 8.5–10.1)
CHLORIDE SERPL-SCNC: 106 MMOL/L (ref 97–108)
CHOLEST SERPL-MCNC: 193 MG/DL
CO2 SERPL-SCNC: 25 MMOL/L (ref 21–32)
CREAT SERPL-MCNC: 1.2 MG/DL (ref 0.55–1.02)
CREAT UR-MCNC: 108 MG/DL
GLOBULIN SER CALC-MCNC: 3.9 G/DL (ref 2–4)
GLUCOSE SERPL-MCNC: 111 MG/DL (ref 65–100)
HDLC SERPL-MCNC: 89 MG/DL
HDLC SERPL: 2.2 (ref 0–5)
LDLC SERPL CALC-MCNC: 87 MG/DL (ref 0–100)
MICROALBUMIN UR-MCNC: 1.62 MG/DL
MICROALBUMIN/CREAT UR-RTO: 15 MG/G (ref 0–30)
POTASSIUM SERPL-SCNC: ABNORMAL MMOL/L (ref 3.5–5.1)
PROT SERPL-MCNC: 7.7 G/DL (ref 6.4–8.2)
SODIUM SERPL-SCNC: 134 MMOL/L (ref 136–145)
TRIGL SERPL-MCNC: 85 MG/DL
VLDLC SERPL CALC-MCNC: 17 MG/DL

## 2024-02-03 DIAGNOSIS — E11.21 TYPE 2 DIABETES MELLITUS WITH DIABETIC NEPHROPATHY (HCC): ICD-10-CM

## 2024-02-05 RX ORDER — GLIPIZIDE 5 MG/1
TABLET, FILM COATED, EXTENDED RELEASE ORAL
Qty: 180 TABLET | Refills: 3 | Status: SHIPPED | OUTPATIENT
Start: 2024-02-05

## 2024-02-05 NOTE — TELEPHONE ENCOUNTER
Last appointment: 1/9/24  Next appointment: 2/20/24  Previous refill encounter(s): 2/8/23    Requested Prescriptions     Pending Prescriptions Disp Refills    glipiZIDE (GLUCOTROL XL) 5 MG extended release tablet [Pharmacy Med Name: GLIPIZIDE ER 5 MG TABLET] 180 tablet 3     Sig: TAKE 2 TABLETS EVERY DAY IN THE MORNING PRIOR TO BREAKFAST         For Pharmacy Admin Tracking Only    Program: Medication Refill  CPA in place:    Recommendation Provided To:   Intervention Detail: New Rx: 1, reason: Patient Preference  Intervention Accepted By:   Gap Closed?:    Time Spent (min): 5

## 2024-02-12 DIAGNOSIS — K21.9 GASTRO-ESOPHAGEAL REFLUX DISEASE WITHOUT ESOPHAGITIS: ICD-10-CM

## 2024-02-14 RX ORDER — FAMOTIDINE 40 MG/1
40 TABLET, FILM COATED ORAL DAILY PRN
Qty: 90 TABLET | Refills: 1 | Status: SHIPPED | OUTPATIENT
Start: 2024-02-14

## 2024-02-14 NOTE — TELEPHONE ENCOUNTER
Last appointment: 1/9/24  Next appointment: 2/20/24  Previous refill encounter(s): 8/15/23 #30 with 5 refills    Requested Prescriptions     Pending Prescriptions Disp Refills    famotidine (PEPCID) 40 MG tablet [Pharmacy Med Name: FAMOTIDINE 40 MG TABLET] 90 tablet 1     Sig: TAKE 1 TABLET BY MOUTH DAILY AS NEEDED (HEARTBURN OR REFLUX)         For Pharmacy Admin Tracking Only    Program: Medication Refill  CPA in place:    Recommendation Provided To:   Intervention Detail: New Rx: 1, reason: Patient Preference  Intervention Accepted By:   Gap Closed?:    Time Spent (min): 5

## 2024-02-20 ENCOUNTER — OFFICE VISIT (OUTPATIENT)
Age: 74
End: 2024-02-20
Payer: MEDICARE

## 2024-02-20 VITALS
OXYGEN SATURATION: 100 % | WEIGHT: 131.4 LBS | HEART RATE: 72 BPM | BODY MASS INDEX: 25.8 KG/M2 | TEMPERATURE: 98.3 F | DIASTOLIC BLOOD PRESSURE: 60 MMHG | SYSTOLIC BLOOD PRESSURE: 130 MMHG | HEIGHT: 60 IN | RESPIRATION RATE: 18 BRPM

## 2024-02-20 DIAGNOSIS — I10 ESSENTIAL (PRIMARY) HYPERTENSION: Primary | ICD-10-CM

## 2024-02-20 DIAGNOSIS — Z79.899 ENCOUNTER FOR LONG-TERM (CURRENT) USE OF MEDICATIONS: ICD-10-CM

## 2024-02-20 PROCEDURE — G8484 FLU IMMUNIZE NO ADMIN: HCPCS | Performed by: FAMILY MEDICINE

## 2024-02-20 PROCEDURE — 99212 OFFICE O/P EST SF 10 MIN: CPT | Performed by: FAMILY MEDICINE

## 2024-02-20 PROCEDURE — 3017F COLORECTAL CA SCREEN DOC REV: CPT | Performed by: FAMILY MEDICINE

## 2024-02-20 PROCEDURE — G8419 CALC BMI OUT NRM PARAM NOF/U: HCPCS | Performed by: FAMILY MEDICINE

## 2024-02-20 PROCEDURE — G8399 PT W/DXA RESULTS DOCUMENT: HCPCS | Performed by: FAMILY MEDICINE

## 2024-02-20 PROCEDURE — 1090F PRES/ABSN URINE INCON ASSESS: CPT | Performed by: FAMILY MEDICINE

## 2024-02-20 PROCEDURE — G8427 DOCREV CUR MEDS BY ELIG CLIN: HCPCS | Performed by: FAMILY MEDICINE

## 2024-02-20 PROCEDURE — 1036F TOBACCO NON-USER: CPT | Performed by: FAMILY MEDICINE

## 2024-02-20 PROCEDURE — 1123F ACP DISCUSS/DSCN MKR DOCD: CPT | Performed by: FAMILY MEDICINE

## 2024-02-20 PROCEDURE — 3078F DIAST BP <80 MM HG: CPT | Performed by: FAMILY MEDICINE

## 2024-02-20 PROCEDURE — 3075F SYST BP GE 130 - 139MM HG: CPT | Performed by: FAMILY MEDICINE

## 2024-02-20 ASSESSMENT — ENCOUNTER SYMPTOMS
COUGH: 0
SHORTNESS OF BREATH: 0
CHEST TIGHTNESS: 0
RHINORRHEA: 0
BLOOD IN STOOL: 0
CONSTIPATION: 0
ABDOMINAL PAIN: 0

## 2024-02-20 ASSESSMENT — PATIENT HEALTH QUESTIONNAIRE - PHQ9
SUM OF ALL RESPONSES TO PHQ QUESTIONS 1-9: 0
SUM OF ALL RESPONSES TO PHQ QUESTIONS 1-9: 0
2. FEELING DOWN, DEPRESSED OR HOPELESS: 0
1. LITTLE INTEREST OR PLEASURE IN DOING THINGS: 0
SUM OF ALL RESPONSES TO PHQ9 QUESTIONS 1 & 2: 0
SUM OF ALL RESPONSES TO PHQ QUESTIONS 1-9: 0
SUM OF ALL RESPONSES TO PHQ QUESTIONS 1-9: 0

## 2024-02-20 NOTE — PROGRESS NOTES
Chief Complaint   Patient presents with    Follow-up     6 week for elevated BP.       \"Have you been to the ER, urgent care clinic since your last visit?  Hospitalized since your last visit?\"    NO    “Have you seen or consulted any other health care providers outside of [facility] since your last visit?”    Yes, Dermatology.             Vitals:    24 1003   BP: 136/75   Pulse:    Resp:    Temp:    SpO2:        Health Maintenance Due   Topic Date Due    Respiratory Syncytial Virus (RSV) Pregnant or age 60 yrs+ (1 - 1-dose 60+ series) Never done    COVID-19 Vaccine ( season) 2023        The patient, Li Ramírez, identity was verified by name and .   
01/09/2024    LDLCALC 87 01/09/2024    CHOLHDLRATIO 2.2 01/09/2024     Hemoglobin A1C   Date Value Ref Range Status   05/19/2021 6.2 (H) 4.0 - 5.6 % Final     Comment:     NEW METHOD PLEASE NOTE NEW REFERENCE RANGE  (NOTE)  HbA1C Interpretive Ranges  <5.7              Normal  5.7 - 6.4         Consider Prediabetes  >6.5              Consider Diabetes       Hemoglobin A1C, POC   Date Value Ref Range Status   01/09/2024 6.5 % Final     Lab Results   Component Value Date    TSH 0.65 07/27/2022       Review of Systems   Constitutional:  Negative for activity change.   HENT:  Negative for congestion and rhinorrhea.    Respiratory:  Negative for cough, chest tightness and shortness of breath.    Cardiovascular:  Negative for chest pain, palpitations and leg swelling.   Gastrointestinal:  Negative for abdominal pain, blood in stool and constipation.   Endocrine: Negative for polyuria.   Genitourinary:  Negative for difficulty urinating, frequency, hematuria and urgency.   Musculoskeletal:  Negative for arthralgias, joint swelling and myalgias.   Skin:  Negative for rash.   Allergic/Immunologic: Negative for environmental allergies.   Neurological:  Negative for dizziness, light-headedness and headaches.   Psychiatric/Behavioral:  Negative for dysphoric mood and sleep disturbance. The patient is not nervous/anxious.        Objective:   Physical Exam  Constitutional:       Appearance: Normal appearance.      Comments: /60   Pulse 72   Temp 98.3 °F (36.8 °C) (Oral)   Resp 18   Ht 1.524 m (5')   Wt 59.6 kg (131 lb 6.4 oz)   SpO2 100%   BMI 25.66 kg/m²    HENT:      Nose: No rhinorrhea.      Mouth/Throat:      Mouth: Mucous membranes are moist.   Cardiovascular:      Rate and Rhythm: Normal rate and regular rhythm.   Pulmonary:      Effort: Pulmonary effort is normal.      Breath sounds: Normal breath sounds.   Abdominal:      General: Bowel sounds are normal.      Palpations: Abdomen is soft.      Tenderness:

## 2024-06-26 RX ORDER — ATORVASTATIN CALCIUM 20 MG/1
TABLET, FILM COATED ORAL
Qty: 90 TABLET | Refills: 3 | Status: SHIPPED | OUTPATIENT
Start: 2024-06-26

## 2024-06-26 NOTE — TELEPHONE ENCOUNTER
Last appointment: 2/20/24  Next appointment: 7/22/24  Previous refill encounter(s): 7/7/23    Requested Prescriptions     Pending Prescriptions Disp Refills    atorvastatin (LIPITOR) 20 MG tablet [Pharmacy Med Name: ATORVASTATIN 20 MG TABLET] 90 tablet 3     Sig: TAKE 1 TABLET BY MOUTH EVERY DAY         For Pharmacy Admin Tracking Only    Program: Medication Refill  CPA in place:    Recommendation Provided To:   Intervention Detail: New Rx: 1, reason: Patient Preference  Intervention Accepted By:   Gap Closed?:    Time Spent (min): 5

## 2024-07-22 ENCOUNTER — OFFICE VISIT (OUTPATIENT)
Age: 74
End: 2024-07-22
Payer: MEDICARE

## 2024-07-22 VITALS
WEIGHT: 129.8 LBS | BODY MASS INDEX: 25.48 KG/M2 | SYSTOLIC BLOOD PRESSURE: 126 MMHG | OXYGEN SATURATION: 99 % | TEMPERATURE: 97.9 F | DIASTOLIC BLOOD PRESSURE: 61 MMHG | HEART RATE: 70 BPM | HEIGHT: 60 IN | RESPIRATION RATE: 16 BRPM

## 2024-07-22 DIAGNOSIS — Z12.31 ENCOUNTER FOR SCREENING MAMMOGRAM FOR BREAST CANCER: ICD-10-CM

## 2024-07-22 DIAGNOSIS — E78.2 MIXED HYPERLIPIDEMIA: ICD-10-CM

## 2024-07-22 DIAGNOSIS — E11.21 TYPE 2 DIABETES MELLITUS WITH DIABETIC NEPHROPATHY, WITHOUT LONG-TERM CURRENT USE OF INSULIN (HCC): ICD-10-CM

## 2024-07-22 DIAGNOSIS — K21.9 GASTRO-ESOPHAGEAL REFLUX DISEASE WITHOUT ESOPHAGITIS: ICD-10-CM

## 2024-07-22 DIAGNOSIS — B37.31 VULVOVAGINAL CANDIDIASIS: ICD-10-CM

## 2024-07-22 DIAGNOSIS — Z79.899 ENCOUNTER FOR LONG-TERM (CURRENT) USE OF MEDICATIONS: ICD-10-CM

## 2024-07-22 DIAGNOSIS — I10 ESSENTIAL (PRIMARY) HYPERTENSION: Primary | ICD-10-CM

## 2024-07-22 LAB
ALBUMIN SERPL-MCNC: 4.1 G/DL (ref 3.5–5)
ALBUMIN/GLOB SERPL: 1.3 (ref 1.1–2.2)
ALP SERPL-CCNC: 61 U/L (ref 45–117)
ALT SERPL-CCNC: 14 U/L (ref 12–78)
ANION GAP SERPL CALC-SCNC: 6 MMOL/L (ref 5–15)
APPEARANCE UR: CLEAR
AST SERPL-CCNC: 10 U/L (ref 15–37)
BACTERIA URNS QL MICRO: NEGATIVE /HPF
BILIRUB SERPL-MCNC: 0.8 MG/DL (ref 0.2–1)
BILIRUB UR QL: NEGATIVE
BUN SERPL-MCNC: 19 MG/DL (ref 6–20)
BUN/CREAT SERPL: 14 (ref 12–20)
CALCIUM SERPL-MCNC: 10 MG/DL (ref 8.5–10.1)
CHLORIDE SERPL-SCNC: 107 MMOL/L (ref 97–108)
CHOLEST SERPL-MCNC: 203 MG/DL
CO2 SERPL-SCNC: 27 MMOL/L (ref 21–32)
COLOR UR: NORMAL
CREAT SERPL-MCNC: 1.33 MG/DL (ref 0.55–1.02)
EPITH CASTS URNS QL MICRO: NORMAL /LPF
ERYTHROCYTE [DISTWIDTH] IN BLOOD BY AUTOMATED COUNT: 13.8 % (ref 11.5–14.5)
GLOBULIN SER CALC-MCNC: 3.1 G/DL (ref 2–4)
GLUCOSE SERPL-MCNC: 120 MG/DL (ref 65–100)
GLUCOSE UR STRIP.AUTO-MCNC: NEGATIVE MG/DL
GLUCOSE, POC: 130 MG/DL
HBA1C MFR BLD: 6.2 %
HCT VFR BLD AUTO: 34.4 % (ref 35–47)
HDLC SERPL-MCNC: 94 MG/DL
HDLC SERPL: 2.2 (ref 0–5)
HGB BLD-MCNC: 11.2 G/DL (ref 11.5–16)
HGB UR QL STRIP: NEGATIVE
KETONES UR QL STRIP.AUTO: NEGATIVE MG/DL
LDLC SERPL CALC-MCNC: 95 MG/DL (ref 0–100)
LEUKOCYTE ESTERASE UR QL STRIP.AUTO: NEGATIVE
MCH RBC QN AUTO: 30.4 PG (ref 26–34)
MCHC RBC AUTO-ENTMCNC: 32.6 G/DL (ref 30–36.5)
MCV RBC AUTO: 93.5 FL (ref 80–99)
NITRITE UR QL STRIP.AUTO: NEGATIVE
NRBC # BLD: 0 K/UL (ref 0–0.01)
NRBC BLD-RTO: 0 PER 100 WBC
PH UR STRIP: 6 (ref 5–8)
PLATELET # BLD AUTO: 274 K/UL (ref 150–400)
PMV BLD AUTO: 10.4 FL (ref 8.9–12.9)
POTASSIUM SERPL-SCNC: 4.6 MMOL/L (ref 3.5–5.1)
PROT SERPL-MCNC: 7.2 G/DL (ref 6.4–8.2)
PROT UR STRIP-MCNC: NEGATIVE MG/DL
RBC # BLD AUTO: 3.68 M/UL (ref 3.8–5.2)
RBC #/AREA URNS HPF: NORMAL /HPF (ref 0–5)
SODIUM SERPL-SCNC: 140 MMOL/L (ref 136–145)
SP GR UR REFRACTOMETRY: 1.02 (ref 1–1.03)
TRIGL SERPL-MCNC: 70 MG/DL
UROBILINOGEN UR QL STRIP.AUTO: 0.2 EU/DL (ref 0.2–1)
VLDLC SERPL CALC-MCNC: 14 MG/DL
WBC # BLD AUTO: 4.4 K/UL (ref 3.6–11)
WBC URNS QL MICRO: NORMAL /HPF (ref 0–4)

## 2024-07-22 PROCEDURE — 99214 OFFICE O/P EST MOD 30 MIN: CPT | Performed by: FAMILY MEDICINE

## 2024-07-22 PROCEDURE — 3078F DIAST BP <80 MM HG: CPT | Performed by: FAMILY MEDICINE

## 2024-07-22 PROCEDURE — 3046F HEMOGLOBIN A1C LEVEL >9.0%: CPT | Performed by: FAMILY MEDICINE

## 2024-07-22 PROCEDURE — 2022F DILAT RTA XM EVC RTNOPTHY: CPT | Performed by: FAMILY MEDICINE

## 2024-07-22 PROCEDURE — G8399 PT W/DXA RESULTS DOCUMENT: HCPCS | Performed by: FAMILY MEDICINE

## 2024-07-22 PROCEDURE — PBSHW AMB POC GLUCOSE BLOOD, BY GLUCOSE MONITORING DEVICE: Performed by: FAMILY MEDICINE

## 2024-07-22 PROCEDURE — 1036F TOBACCO NON-USER: CPT | Performed by: FAMILY MEDICINE

## 2024-07-22 PROCEDURE — G8427 DOCREV CUR MEDS BY ELIG CLIN: HCPCS | Performed by: FAMILY MEDICINE

## 2024-07-22 PROCEDURE — 83036 HEMOGLOBIN GLYCOSYLATED A1C: CPT | Performed by: FAMILY MEDICINE

## 2024-07-22 PROCEDURE — 3074F SYST BP LT 130 MM HG: CPT | Performed by: FAMILY MEDICINE

## 2024-07-22 PROCEDURE — 1123F ACP DISCUSS/DSCN MKR DOCD: CPT | Performed by: FAMILY MEDICINE

## 2024-07-22 PROCEDURE — PBSHW AMB POC HEMOGLOBIN A1C: Performed by: FAMILY MEDICINE

## 2024-07-22 PROCEDURE — G8419 CALC BMI OUT NRM PARAM NOF/U: HCPCS | Performed by: FAMILY MEDICINE

## 2024-07-22 PROCEDURE — 1090F PRES/ABSN URINE INCON ASSESS: CPT | Performed by: FAMILY MEDICINE

## 2024-07-22 PROCEDURE — 82962 GLUCOSE BLOOD TEST: CPT | Performed by: FAMILY MEDICINE

## 2024-07-22 PROCEDURE — 3017F COLORECTAL CA SCREEN DOC REV: CPT | Performed by: FAMILY MEDICINE

## 2024-07-22 RX ORDER — FLUCONAZOLE 150 MG/1
150 TABLET ORAL DAILY
Qty: 3 TABLET | Refills: 1 | Status: SHIPPED | OUTPATIENT
Start: 2024-07-22 | End: 2024-07-25

## 2024-07-22 ASSESSMENT — ENCOUNTER SYMPTOMS
SHORTNESS OF BREATH: 0
CONSTIPATION: 0
BLOOD IN STOOL: 0
CHEST TIGHTNESS: 0
ABDOMINAL PAIN: 0
RHINORRHEA: 0
COUGH: 0

## 2024-07-22 NOTE — PROGRESS NOTES
Subjective:      Patient ID: Li Ramírez is a 73 y.o. female.    HPI  Follow up on chronic medical problems.  Overall feeling well.    Cardiovascular Review:  The patient has hypertension.    Diet and Lifestyle: generally follows a low fat low cholesterol diet, generally follows a low sodium diet, exercises sporadically, nonsmoker    Pertinent ROS: taking medications as instructed, no medication side effects noted, no TIA's, no chest pain on exertion, no dyspnea on exertion, no swelling of ankles, no palpitations, no headache or dizziness.      DM type II follow up:  Compliant w/ meds, diabetic diet, and exercise.     Denies any tingling sensation.  No polyuria and polydipsia.  No blurred vision.  No significant weight changes.   Hypercholesterolemia follow up:  Compliant w/ low fat, low cholesterol diet.  Has been taking the Lipitor nightly.  Exercising some but plans to increased her exercise.  No muscle nor no skin discoloration.  Patient fasting today.  HM:  Mammogram 7/31/2023  Colonoscopy 1/12/2021 by Dr. Marquez repeat in 5 years.      Past Medical History:   Diagnosis Date    Alopecia     central centrifugal cicatricial    Diabetes (HCC) 2012    HTN (hypertension) 3/16/2010    Hypercholesterolemia     Sarcoidosis 1980s     Past Surgical History:   Procedure Laterality Date    COLONOSCOPY N/A 1/12/2021    COLONOSCOPY performed by Virgilio Marquez MD at [facility] ENDOSCOPY    COLONOSCOPY  7/16/2007    repeat in 2010    COLONOSCOPY W/BIOPSY SINGLE/MULTIPLE  07/2010    repear in 5yr /dr. panchal.    ORTHOPEDIC SURGERY  09/2009    repair tear left knee     Current Outpatient Medications   Medication Sig Dispense Refill    atorvastatin (LIPITOR) 20 MG tablet TAKE 1 TABLET BY MOUTH EVERY DAY 90 tablet 3    famotidine (PEPCID) 40 MG tablet TAKE 1 TABLET BY MOUTH DAILY AS NEEDED (HEARTBURN OR REFLUX) 90 tablet 1    Apoaequorin (PREVAGEN PO) Take by mouth      ferrous gluconate (FERGON) 324 (38 Fe) MG tablet Take 1

## 2024-07-22 NOTE — PROGRESS NOTES
Chief Complaint   Patient presents with    Follow-up     Patient is here today for a 5 month diabetes check.       \"Have you been to the ER, urgent care clinic since your last visit?  Hospitalized since your last visit?\"    NO    “Have you seen or consulted any other health care providers outside of [facility] since your last visit?”    YES, Dentist             Vitals:    24 0846   BP: 126/61   Pulse: 70   Resp: 16   Temp: 97.9 °F (36.6 °C)   SpO2: 99%       There are no preventive care reminders to display for this patient.     The patient, Li Ramírez, identity was verified by name and .

## 2024-08-08 DIAGNOSIS — I10 ESSENTIAL (PRIMARY) HYPERTENSION: ICD-10-CM

## 2024-08-08 RX ORDER — OLMESARTAN MEDOXOMIL AND HYDROCHLOROTHIAZIDE 20/12.5 20; 12.5 MG/1; MG/1
2 TABLET ORAL DAILY
Qty: 30 TABLET | Refills: 0 | Status: SHIPPED | OUTPATIENT
Start: 2024-08-08

## 2024-08-08 NOTE — TELEPHONE ENCOUNTER
Rx pended with updated dose of 2/day as per 10/23/23.    Last appointment: 7/22/24  Next appointment: Advised to follow-up 1/22/25  Previous refill encounter(s): 10/23/23 #180 with 3 refills    Requested Prescriptions     Pending Prescriptions Disp Refills    olmesartan-hydroCHLOROthiazide (BENICAR HCT) 20-12.5 MG per tablet [Pharmacy Med Name: OLMESARTAN-HCTZ 20-12.5 MG TAB] 60 tablet 0     Sig: Take 2 tablets by mouth daily         For Pharmacy Admin Tracking Only    Program: Medication Refill  CPA in place:    Recommendation Provided To:   Intervention Detail: New Rx: 1, reason: Patient Preference  Intervention Accepted By:   Gap Closed?:    Time Spent (min): 5

## 2024-08-12 DIAGNOSIS — K21.9 GASTRO-ESOPHAGEAL REFLUX DISEASE WITHOUT ESOPHAGITIS: ICD-10-CM

## 2024-08-12 RX ORDER — FAMOTIDINE 40 MG/1
40 TABLET, FILM COATED ORAL DAILY PRN
Qty: 90 TABLET | Refills: 1 | Status: SHIPPED | OUTPATIENT
Start: 2024-08-12

## 2024-08-12 NOTE — TELEPHONE ENCOUNTER
Last appointment: 7/22/24  Next appointment: Advised to follow-up 1/22/25  Previous refill encounter(s): 2/14/24 #90 with 1 refill    Requested Prescriptions     Pending Prescriptions Disp Refills    famotidine (PEPCID) 40 MG tablet 90 tablet 1     Sig: Take 1 tablet by mouth daily as needed (heartburn or reflux)         For Pharmacy Admin Tracking Only    Program: Medication Refill  CPA in place:    Recommendation Provided To:   Intervention Detail: New Rx: 1, reason: Patient Preference  Intervention Accepted By:   Gap Closed?:    Time Spent (min): 5

## 2024-08-13 ENCOUNTER — HOSPITAL ENCOUNTER (OUTPATIENT)
Facility: HOSPITAL | Age: 74
Discharge: HOME | End: 2024-08-16
Attending: FAMILY MEDICINE
Payer: MEDICARE

## 2024-08-13 VITALS — WEIGHT: 129 LBS | BODY MASS INDEX: 25.32 KG/M2 | HEIGHT: 60 IN

## 2024-08-13 DIAGNOSIS — Z12.31 ENCOUNTER FOR SCREENING MAMMOGRAM FOR BREAST CANCER: ICD-10-CM

## 2024-08-13 PROCEDURE — 77063 BREAST TOMOSYNTHESIS BI: CPT

## 2024-09-02 DIAGNOSIS — E11.21 TYPE 2 DIABETES MELLITUS WITH DIABETIC NEPHROPATHY (HCC): ICD-10-CM

## 2024-09-04 RX ORDER — SITAGLIPTIN AND METFORMIN HYDROCHLORIDE 1000; 50 MG/1; MG/1
TABLET, FILM COATED, EXTENDED RELEASE ORAL
Qty: 180 TABLET | Refills: 3 | Status: SHIPPED | OUTPATIENT
Start: 2024-09-04

## 2024-09-04 NOTE — TELEPHONE ENCOUNTER
Last appointment: 7/22/24  Next appointment: Advised to follow-up 1/22/25  Previous refill encounter(s): 9/13/23    Requested Prescriptions     Pending Prescriptions Disp Refills    JANUMET XR  MG TB24 per extended release tablet [Pharmacy Med Name: JANUMET XR 50-1,000 MG TABLET] 180 tablet 3     Sig: TAKE 2 TABS BY MOUTH DAILY (WITH BREAKFAST).         For Pharmacy Admin Tracking Only    Program: Medication Refill  CPA in place:    Recommendation Provided To:   Intervention Detail: New Rx: 1, reason: Patient Preference  Intervention Accepted By:   Gap Closed?:    Time Spent (min): 5

## 2024-09-11 DIAGNOSIS — I10 ESSENTIAL (PRIMARY) HYPERTENSION: ICD-10-CM

## 2024-09-11 RX ORDER — OLMESARTAN MEDOXOMIL AND HYDROCHLOROTHIAZIDE 20/12.5 20; 12.5 MG/1; MG/1
2 TABLET ORAL DAILY
Qty: 180 TABLET | Refills: 3 | Status: SHIPPED | OUTPATIENT
Start: 2024-09-11

## 2024-12-25 DIAGNOSIS — I10 ESSENTIAL (PRIMARY) HYPERTENSION: ICD-10-CM

## 2024-12-26 RX ORDER — AMLODIPINE BESYLATE 5 MG/1
5 TABLET ORAL DAILY
Qty: 30 TABLET | Refills: 0 | Status: SHIPPED | OUTPATIENT
Start: 2024-12-26

## 2024-12-26 NOTE — TELEPHONE ENCOUNTER
Last appointment: 7/22/24  Next appointment: Advised to follow-up 1/22/25  Previous refill encounter(s): 1/9/24    Requested Prescriptions     Pending Prescriptions Disp Refills    amLODIPine (NORVASC) 5 MG tablet [Pharmacy Med Name: AMLODIPINE BESYLATE 5 MG TAB] 30 tablet 0     Sig: TAKE 1 TABLET BY MOUTH EVERY DAY         For Pharmacy Admin Tracking Only    Program: Medication Refill  CPA in place:    Recommendation Provided To:   Intervention Detail: New Rx: 1, reason: Patient Preference  Intervention Accepted By:   Gap Closed?:    Time Spent (min): 5

## 2025-01-02 ENCOUNTER — TELEPHONE (OUTPATIENT)
Age: 75
End: 2025-01-02

## 2025-01-02 NOTE — TELEPHONE ENCOUNTER
Patient requesting a call back to discuss recurring vaginal irritation, itching.  Patient has an appointment on 1-31-25, but requesting a prescription  be sent to [facility] [phone].  Patient can be  reached at [phone].

## 2025-01-18 DIAGNOSIS — I10 ESSENTIAL (PRIMARY) HYPERTENSION: ICD-10-CM

## 2025-01-21 RX ORDER — AMLODIPINE BESYLATE 5 MG/1
5 TABLET ORAL DAILY
Qty: 90 TABLET | Refills: 1 | Status: SHIPPED | OUTPATIENT
Start: 2025-01-21

## 2025-01-31 ENCOUNTER — OFFICE VISIT (OUTPATIENT)
Age: 75
End: 2025-01-31
Payer: MEDICARE

## 2025-01-31 VITALS
DIASTOLIC BLOOD PRESSURE: 66 MMHG | RESPIRATION RATE: 18 BRPM | TEMPERATURE: 98.3 F | HEART RATE: 71 BPM | SYSTOLIC BLOOD PRESSURE: 151 MMHG | BODY MASS INDEX: 25.12 KG/M2 | OXYGEN SATURATION: 96 % | WEIGHT: 128.6 LBS

## 2025-01-31 DIAGNOSIS — N90.5 VULVAR ATROPHY: ICD-10-CM

## 2025-01-31 DIAGNOSIS — N90.89 VULVAL LESION: ICD-10-CM

## 2025-01-31 DIAGNOSIS — N95.2 ATROPHIC VAGINITIS: Primary | ICD-10-CM

## 2025-01-31 PROCEDURE — 3017F COLORECTAL CA SCREEN DOC REV: CPT | Performed by: FAMILY MEDICINE

## 2025-01-31 PROCEDURE — 1036F TOBACCO NON-USER: CPT | Performed by: FAMILY MEDICINE

## 2025-01-31 PROCEDURE — 1090F PRES/ABSN URINE INCON ASSESS: CPT | Performed by: FAMILY MEDICINE

## 2025-01-31 PROCEDURE — 1126F AMNT PAIN NOTED NONE PRSNT: CPT | Performed by: FAMILY MEDICINE

## 2025-01-31 PROCEDURE — G8427 DOCREV CUR MEDS BY ELIG CLIN: HCPCS | Performed by: FAMILY MEDICINE

## 2025-01-31 PROCEDURE — G8419 CALC BMI OUT NRM PARAM NOF/U: HCPCS | Performed by: FAMILY MEDICINE

## 2025-01-31 PROCEDURE — 1159F MED LIST DOCD IN RCRD: CPT | Performed by: FAMILY MEDICINE

## 2025-01-31 PROCEDURE — G8399 PT W/DXA RESULTS DOCUMENT: HCPCS | Performed by: FAMILY MEDICINE

## 2025-01-31 PROCEDURE — 3077F SYST BP >= 140 MM HG: CPT | Performed by: FAMILY MEDICINE

## 2025-01-31 PROCEDURE — 3078F DIAST BP <80 MM HG: CPT | Performed by: FAMILY MEDICINE

## 2025-01-31 PROCEDURE — 1123F ACP DISCUSS/DSCN MKR DOCD: CPT | Performed by: FAMILY MEDICINE

## 2025-01-31 PROCEDURE — 99213 OFFICE O/P EST LOW 20 MIN: CPT | Performed by: FAMILY MEDICINE

## 2025-01-31 RX ORDER — CONJUGATED ESTROGENS 0.62 MG/G
CREAM VAGINAL
Qty: 30 G | Refills: 0 | Status: SHIPPED | OUTPATIENT
Start: 2025-01-31

## 2025-01-31 SDOH — ECONOMIC STABILITY: FOOD INSECURITY: WITHIN THE PAST 12 MONTHS, THE FOOD YOU BOUGHT JUST DIDN'T LAST AND YOU DIDN'T HAVE MONEY TO GET MORE.: NEVER TRUE

## 2025-01-31 SDOH — ECONOMIC STABILITY: FOOD INSECURITY: WITHIN THE PAST 12 MONTHS, YOU WORRIED THAT YOUR FOOD WOULD RUN OUT BEFORE YOU GOT MONEY TO BUY MORE.: NEVER TRUE

## 2025-01-31 ASSESSMENT — PATIENT HEALTH QUESTIONNAIRE - PHQ9
SUM OF ALL RESPONSES TO PHQ QUESTIONS 1-9: 0
1. LITTLE INTEREST OR PLEASURE IN DOING THINGS: NOT AT ALL
SUM OF ALL RESPONSES TO PHQ QUESTIONS 1-9: 0
SUM OF ALL RESPONSES TO PHQ QUESTIONS 1-9: 0
SUM OF ALL RESPONSES TO PHQ9 QUESTIONS 1 & 2: 0
SUM OF ALL RESPONSES TO PHQ QUESTIONS 1-9: 0
2. FEELING DOWN, DEPRESSED OR HOPELESS: NOT AT ALL

## 2025-01-31 ASSESSMENT — ENCOUNTER SYMPTOMS
ABDOMINAL PAIN: 0
CHEST TIGHTNESS: 0
RHINORRHEA: 0
CONSTIPATION: 0
COUGH: 0
BLOOD IN STOOL: 0
SHORTNESS OF BREATH: 0

## 2025-01-31 NOTE — PROGRESS NOTES
Li Ramírez is a 74 y.o. female presents for a problem visit.    Patient reported a vulvar lesion.  No LMP recorded. Patient is postmenopausal.  Birth Control: post menopausal status.  Last Pap: normal obtained 2013.    1. Have you been to the ER, urgent care clinic, or hospitalized since your last visit? N/A New patient    2. Have you seen or consulted any other health care providers outside of the [facility] since your last visit? N/A New patient    Examination chaperoned by Brendan Jewell LPN.

## 2025-02-02 DIAGNOSIS — K21.9 GASTRO-ESOPHAGEAL REFLUX DISEASE WITHOUT ESOPHAGITIS: ICD-10-CM

## 2025-02-03 ENCOUNTER — OFFICE VISIT (OUTPATIENT)
Age: 75
End: 2025-02-03
Payer: MEDICARE

## 2025-02-03 VITALS
TEMPERATURE: 98.1 F | RESPIRATION RATE: 16 BRPM | BODY MASS INDEX: 24.43 KG/M2 | WEIGHT: 129.4 LBS | OXYGEN SATURATION: 96 % | HEIGHT: 61 IN | HEART RATE: 76 BPM | SYSTOLIC BLOOD PRESSURE: 132 MMHG | DIASTOLIC BLOOD PRESSURE: 67 MMHG

## 2025-02-03 DIAGNOSIS — L90.0 LICHEN SCLEROSUS: Primary | ICD-10-CM

## 2025-02-03 PROCEDURE — 1126F AMNT PAIN NOTED NONE PRSNT: CPT | Performed by: OBSTETRICS & GYNECOLOGY

## 2025-02-03 PROCEDURE — 3017F COLORECTAL CA SCREEN DOC REV: CPT | Performed by: OBSTETRICS & GYNECOLOGY

## 2025-02-03 PROCEDURE — 1090F PRES/ABSN URINE INCON ASSESS: CPT | Performed by: OBSTETRICS & GYNECOLOGY

## 2025-02-03 PROCEDURE — 1160F RVW MEDS BY RX/DR IN RCRD: CPT | Performed by: OBSTETRICS & GYNECOLOGY

## 2025-02-03 PROCEDURE — 99203 OFFICE O/P NEW LOW 30 MIN: CPT | Performed by: OBSTETRICS & GYNECOLOGY

## 2025-02-03 PROCEDURE — G8427 DOCREV CUR MEDS BY ELIG CLIN: HCPCS | Performed by: OBSTETRICS & GYNECOLOGY

## 2025-02-03 PROCEDURE — 1159F MED LIST DOCD IN RCRD: CPT | Performed by: OBSTETRICS & GYNECOLOGY

## 2025-02-03 PROCEDURE — 1123F ACP DISCUSS/DSCN MKR DOCD: CPT | Performed by: OBSTETRICS & GYNECOLOGY

## 2025-02-03 PROCEDURE — G8420 CALC BMI NORM PARAMETERS: HCPCS | Performed by: OBSTETRICS & GYNECOLOGY

## 2025-02-03 PROCEDURE — G8399 PT W/DXA RESULTS DOCUMENT: HCPCS | Performed by: OBSTETRICS & GYNECOLOGY

## 2025-02-03 PROCEDURE — 3075F SYST BP GE 130 - 139MM HG: CPT | Performed by: OBSTETRICS & GYNECOLOGY

## 2025-02-03 PROCEDURE — 3078F DIAST BP <80 MM HG: CPT | Performed by: OBSTETRICS & GYNECOLOGY

## 2025-02-03 PROCEDURE — 1036F TOBACCO NON-USER: CPT | Performed by: OBSTETRICS & GYNECOLOGY

## 2025-02-03 RX ORDER — BETAMETHASONE VALERATE 1.2 MG/G
CREAM TOPICAL
Qty: 45 G | Refills: 3 | Status: SHIPPED | OUTPATIENT
Start: 2025-02-03 | End: 2025-02-10

## 2025-02-03 RX ORDER — FAMOTIDINE 40 MG/1
40 TABLET, FILM COATED ORAL DAILY PRN
Qty: 90 TABLET | Refills: 3 | Status: SHIPPED | OUTPATIENT
Start: 2025-02-03

## 2025-02-03 NOTE — PROGRESS NOTES
Li Ramírez is a 74 y.o. female presents for a problem visit.    Patient reported a vulvar lesion.  No LMP recorded. Patient is postmenopausal.  Birth Control: post menopausal status.  Last Pap: normal obtained 2013.    Vulvar lesion evaluation    Li Ramírez is a 74 y.o. female  No LMP recorded. Patient is postmenopausal.who presents with a lesion on her vulva.  She noticed it several  months ago.  The lesion has shown the following change: itch. No new lesions have developed.    She reports the following associated symptoms: severe sensitivity and inability to control scratching.    She denies the following associated symptoms: drainage, swelling  Aggravating factors: none.  Alleviating factors: none--was given Premarin cream which cost $400 and did not help.      Past Medical History:   Diagnosis Date    Alopecia     central centrifugal cicatricial    Diabetes (HCC) 2012    HTN (hypertension) 3/16/2010    Hypercholesterolemia     Sarcoidosis 1980s     Past Surgical History:   Procedure Laterality Date    COLONOSCOPY N/A 1/12/2021    COLONOSCOPY performed by Virgilio Marquez MD at [facility] ENDOSCOPY    COLONOSCOPY  7/16/2007    repeat in 2010    COLONOSCOPY W/BIOPSY SINGLE/MULTIPLE  07/2010    repear in 5yr /dr. panchal.    ORTHOPEDIC SURGERY  09/2009    repair tear left knee     Social History     Occupational History    Not on file   Tobacco Use    Smoking status: Never    Smokeless tobacco: Never   Vaping Use    Vaping status: Never Used   Substance and Sexual Activity    Alcohol use: No     Alcohol/week: 0.0 standard drinks of alcohol    Drug use: No    Sexual activity: Yes     Family History   Problem Relation Age of Onset    Diabetes Maternal Grandmother     Diabetes Father     Diabetes Sister     Hypertension Sister     Hypertension Father     Stroke Mother     Cancer Mother         lung       Allergies   Allergen Reactions    Azithromycin Other (See Comments)     Hallucinations     Prior to

## 2025-02-03 NOTE — TELEPHONE ENCOUNTER
Last appointment: 1/31/25  Next appointment: none  Previous refill encounter(s): 8/12/24 #90 with 1 refill    Requested Prescriptions     Pending Prescriptions Disp Refills    famotidine (PEPCID) 40 MG tablet [Pharmacy Med Name: FAMOTIDINE 40 MG TABLET] 90 tablet 3     Sig: TAKE 1 TABLET BY MOUTH DAILY AS NEEDED (HEARTBURN OR REFLUX)         For Pharmacy Admin Tracking Only    Program: Medication Refill  CPA in place:    Recommendation Provided To:   Intervention Detail: New Rx: 1, reason: Patient Preference  Intervention Accepted By:   Gap Closed?:    Time Spent (min): 5

## 2025-03-25 DIAGNOSIS — N95.2 ATROPHIC VAGINITIS: ICD-10-CM

## 2025-03-25 DIAGNOSIS — N90.5 VULVAR ATROPHY: ICD-10-CM

## 2025-03-26 RX ORDER — CONJUGATED ESTROGENS 0.62 MG/G
CREAM VAGINAL
Qty: 30 G | Refills: 0 | Status: SHIPPED | OUTPATIENT
Start: 2025-03-26

## 2025-03-26 NOTE — TELEPHONE ENCOUNTER
Last appointment: 1/31/25  Next appointment: none  Previous refill encounter(s): 1/31/25 #30g    Requested Prescriptions     Pending Prescriptions Disp Refills    PREMARIN 0.625 MG/GM CREA vaginal cream [Pharmacy Med Name: PREMARIN VAGINAL CREAM-APPL] 30 g 0     Sig: APPLY TO EXTERNAL GENITAL AREA EVERY NIGHT UNTIL YOU ARE SEEN BY GYN FOR FURTHER INSTRUCTIONS.         For Pharmacy Admin Tracking Only    Program: Medication Refill  CPA in place:    Recommendation Provided To:   Intervention Detail: New Rx: 1, reason: Patient Preference  Intervention Accepted By:   Gap Closed?:    Time Spent (min): 5

## 2025-05-24 NOTE — PROGRESS NOTES
HISTORY OF PRESENT ILLNESS  Miryam Hodges is a 66 y.o. female. HPI   Follow up on chronic medical problems. Doing the precautionary measures at home to reduce risks of exposure COVID19. Also wearing mask when she is going out. No known sick contacts or known exposure to [facility] Backbone. Overall feeling well. HTN follow up:  Compliant w/ meds, low salt diet, and exercise. Tolerating change to benicar HCT. No home bp monitoring. No swelling, headache or dizziness. No chest pain, SOB, palpitations. DM type II follow up:  Compliant w/ meds, diabetic diet, and exercise. Has not been checking her BS over the past several months. Denies any tingling sensation. No polyuria and polydipsia. No blurred vision. No significant weight changes. Hypercholesterolemia follow up:  Compliant w/ low fat, low cholesterol diet. Has been taking the Lipitor nightly. Exercising some. No muscle nor no skin discoloration. Patient fasting today. HM:  Mammogram 7/7/2021   Colonoscopy 1/12/2021 by Dr. Hema Corea repeat in 5 years.    Eye exam 12/8/2020 by Dr. Arturo Whitt. Patient states she has an eye exam 12/21/2021. Patient Active Problem List   Diagnosis Code    Environmental allergies Z91.09    Hypovitaminosis D E55.9    Diabetes mellitus ([facility] [address] E11.9    Sarcoidosis D86.9    Essential hypertension I10    Hyperlipidemia E78.5    Encounter for medication monitoring Z51.81    Type 2 diabetes with nephropathy (HCC) E11.21       Current Outpatient Medications   Medication Sig Dispense Refill    pramoxine (Vagisil Maximum Strength) 1 % towl by Apply Externally route. As needed      conjugated estrogens (PREMARIN) 0.625 mg/gram vaginal cream Insert 0.5 g into vagina every Monday and Thursday. 30 g 3    pantoprazole (PROTONIX) 40 mg tablet TAKE 1 TABLET BY MOUTH EVERY DAY 90 Tablet 3    Janumet XR 50-1,000 mg TM24 TAKE 2 TABS BY MOUTH DAILY (WITH BREAKFAST).  180 Tablet 3    olmesartan-hydroCHLOROthiazide (BENICAR HCT) 20-12.5 mg per tablet TAKE 1 TABLET BY MOUTH EVERY DAY 90 Tablet 3    atorvastatin (LIPITOR) 20 mg tablet TAKE 1 TABLET BY MOUTH EVERY DAY 90 Tablet 3    glipiZIDE SR (GLUCOTROL XL) 5 mg CR tablet Take 5 mg by mouth daily.  OTHER Prevagen- take 1 tab daily      glucose blood VI test strips (ASCENSIA CONTOUR) strip #100  As directed 100 Strip 11       Allergies   Allergen Reactions    Azithromycin Other (comments)     Hallucinations       Past Medical History:   Diagnosis Date    Diabetes ([address] 2012    HTN (hypertension) 3/16/2010    Hypercholesterolemia     Sarcoidosis 1980s       Past Surgical History:   Procedure Laterality Date    COLONOSCOPY N/A 1/12/2021    COLONOSCOPY performed by Julieta Mata MD at [address] [place] [address] [place], COLON, DIAGNOSTIC  7/16/2007    repeat in 2010    HX ORTHOPAEDIC  09/2009    repair tear left knee    DC COLONOSCOPY W/BIOPSY SINGLE/MULTIPLE  07/2010    repear in 5yr /dr. Samina Khan.        Family History   Problem Relation Age of Onset   Yang Rodriguezoln Cancer Mother         lung    Stroke Mother     Diabetes Father     Hypertension Father     Hypertension Sister     Diabetes Sister     Diabetes Maternal Grandmother        Social History     Tobacco Use    Smoking status: Never Smoker    Smokeless tobacco: Never Used   Substance Use Topics    Alcohol use: No     Alcohol/week: 0.0 standard drinks        Lab Results   Component Value Date/Time    WBC 4.8 11/23/2021 10:36 AM    HGB 10.6 (L) 11/23/2021 10:36 AM    HCT 33.0 (L) 11/23/2021 10:36 AM    PLATELET 830 03/50/0374 10:36 AM    MCV 93.0 11/23/2021 10:36 AM     Lab Results   Component Value Date/Time    Cholesterol, total 150 11/23/2021 10:36 AM    HDL Cholesterol 79 11/23/2021 10:36 AM    LDL, calculated 57.6 11/23/2021 10:36 AM    Triglyceride 67 11/23/2021 10:36 AM    CHOL/HDL Ratio 1.9 11/23/2021 10:36 AM     Lab Results   Component Value Date/Time    TSH 0.836 03/06/2017 08:39 AM    T4, Free 1.20 02/14/2014 12:30 PM      Lab Results   Component Value Date/Time    Sodium 137 11/23/2021 10:36 AM    Potassium 4.3 11/23/2021 10:36 AM    Chloride 107 11/23/2021 10:36 AM    CO2 23 11/23/2021 10:36 AM    Anion gap 7 11/23/2021 10:36 AM    Glucose 110 (H) 11/23/2021 10:36 AM    BUN 20 11/23/2021 10:36 AM    Creatinine 1.20 (H) 11/23/2021 10:36 AM    BUN/Creatinine ratio 17 11/23/2021 10:36 AM    GFR est AA 54 (L) 11/23/2021 10:36 AM    GFR est non-AA 44 (L) 11/23/2021 10:36 AM    Calcium 9.5 11/23/2021 10:36 AM    Bilirubin, total 0.6 11/23/2021 10:36 AM    ALT (SGPT) 22 11/23/2021 10:36 AM    Alk. phosphatase 62 11/23/2021 10:36 AM    Protein, total 7.0 11/23/2021 10:36 AM    Albumin 3.9 11/23/2021 10:36 AM    Globulin 3.1 11/23/2021 10:36 AM    A-G Ratio 1.3 11/23/2021 10:36 AM      Lab Results   Component Value Date/Time    Hemoglobin A1c 6.2 (H) 05/19/2021 09:15 AM    Hemoglobin A1c (POC) 5.9 11/23/2021 10:25 AM         Review of Systems   Constitutional: Negative for malaise/fatigue. HENT: Negative for congestion. Eyes: Negative for blurred vision. Respiratory: Negative for cough and shortness of breath. Cardiovascular: Negative for chest pain, palpitations and leg swelling. Gastrointestinal: Negative for abdominal pain, constipation and heartburn. Genitourinary: Negative for dysuria, frequency and urgency. Musculoskeletal: Negative for back pain and joint pain. Neurological: Negative for dizziness, tingling and headaches. Endo/Heme/Allergies: Negative for environmental allergies. Psychiatric/Behavioral: Negative for depression. The patient does not have insomnia. Physical Exam  Vitals and nursing note reviewed. Constitutional:       Appearance: Normal appearance. She is well-developed.       Comments: /72 (BP 1 Location: Right arm, BP Patient Position: Sitting, BP Cuff Size: Adult)   Pulse 97   Temp 97.9 °F (36.6 °C) (Oral)   Resp 16   Ht 5' (1.524 m)   Wt 149 lb (67.6 kg)   LMP 04/15/2002   SpO2 98%   BMI 29.10 kg/m²    HENT:      Right Ear: Tympanic membrane and ear canal normal.      Left Ear: Tympanic membrane and ear canal normal.      Nose: No mucosal edema. Neck:      Thyroid: No thyromegaly. Cardiovascular:      Rate and Rhythm: Normal rate and regular rhythm. Heart sounds: Normal heart sounds. No gallop. Pulmonary:      Effort: Pulmonary effort is normal.      Breath sounds: Normal breath sounds. Abdominal:      General: Bowel sounds are normal.      Palpations: Abdomen is soft. There is no mass. Tenderness: There is no abdominal tenderness. Musculoskeletal:         General: Normal range of motion. Cervical back: Normal range of motion and neck supple. Right lower leg: No edema. Left lower leg: No edema. Comments: Foot exam done . Normal sensation to PP, LT and vibration. Sensation intact to microfilament testing. Pulses intact. No swelling. No skin lesions or sores noted. No tinea present. Lymphadenopathy:      Cervical: No cervical adenopathy. Skin:     General: Skin is warm and dry. Neurological:      General: No focal deficit present. Mental Status: She is alert and oriented to person, place, and time. Psychiatric:         Mood and Affect: Mood normal.         ASSESSMENT and PLAN  Diagnoses and all orders for this visit:    1. Essential hypertension  Discussed sodium restriction, high k rich diet, maintaining ideal body weight and regular exercise program such as daily walking 30 min perday 4-5 times per week, as physiologic means to achieve blood pressure control. Medication compliance advised. 2. Type 2 diabetes with nephropathy (HCC)  a1c stable at 5.7%. Continue to monitor. Work on diet and exercise. -     AMB POC HEMOGLOBIN A1C  -     AMB POC GLUCOSE, QUANTITATIVE, BLOOD    3. Mixed hyperlipidemia  Stable at goal.  Continue to monitor. Work on diet and exercise.     4. Encounter for medication monitoring  -     METABOLIC PANEL, BASIC; Future      Follow-up and Dispositions    · Return in about 4 months (around 6/22/2022). current treatment plan is effective, no change in therapy  reviewed diet, exercise and weight control  cardiovascular risk and specific lipid/LDL goals reviewed  reviewed medications and side effects in detail  specific diabetic recommendations: low cholesterol diet, weight control and daily exercise discussed, all medications, side effects and compliance discussed carefully, foot care discussed and Podiatry visits discussed, annual eye examinations at Ophthalmology discussed and glycohemoglobin and other lab monitoring discussed     I have discussed diagnosis listed in this note with pt and/or family. I have discussed treatment plans and options and the risk/benefit analysis of those options, including safe use of medications and possible medication side effects. Through the use of shared decision making we have agreed to the above plan. The patient has received an after-visit summary and questions were answered concerning future plans and follow up. Advise pt of any urgent changes then to proceed to the ER. 0 (no pain/absence of nonverbal indicators of pain)

## 2025-06-09 RX ORDER — ATORVASTATIN CALCIUM 20 MG/1
20 TABLET, FILM COATED ORAL DAILY
Qty: 90 TABLET | Refills: 0 | Status: SHIPPED | OUTPATIENT
Start: 2025-06-09

## 2025-06-09 RX ORDER — ATORVASTATIN CALCIUM 20 MG/1
20 TABLET, FILM COATED ORAL DAILY
Qty: 90 TABLET | Refills: 3 | Status: SHIPPED | OUTPATIENT
Start: 2025-06-09 | End: 2025-06-09 | Stop reason: SDUPTHER

## 2025-06-09 NOTE — TELEPHONE ENCOUNTER
Last appointment: 1/31/25  Next appointment: none  Previous refill encounter(s): 6/26/24    Requested Prescriptions     Pending Prescriptions Disp Refills    atorvastatin (LIPITOR) 20 MG tablet [Pharmacy Med Name: ATORVASTATIN 20 MG TABLET] 90 tablet 3     Sig: TAKE 1 TABLET BY MOUTH EVERY DAY         For Pharmacy Admin Tracking Only    Program: Medication Refill  CPA in place:    Recommendation Provided To:   Intervention Detail: New Rx: 1, reason: Patient Preference  Intervention Accepted By:   Gap Closed?:    Time Spent (min): 5

## 2025-06-16 ENCOUNTER — TELEPHONE (OUTPATIENT)
Age: 75
End: 2025-06-16

## 2025-07-09 DIAGNOSIS — I10 ESSENTIAL (PRIMARY) HYPERTENSION: ICD-10-CM

## 2025-07-09 RX ORDER — AMLODIPINE BESYLATE 5 MG/1
5 TABLET ORAL DAILY
Qty: 90 TABLET | Refills: 3 | Status: SHIPPED | OUTPATIENT
Start: 2025-07-09

## 2025-07-09 NOTE — TELEPHONE ENCOUNTER
Last appointment: 1/31/25 (acute), 7/22/24 (wellness)  Next appointment: none  Previous refill encounter(s): 1/21/25 #90    Requested Prescriptions     Pending Prescriptions Disp Refills    amLODIPine (NORVASC) 5 MG tablet [Pharmacy Med Name: AMLODIPINE BESYLATE 5 MG TAB] 90 tablet 3     Sig: TAKE 1 TABLET BY MOUTH EVERY DAY         For Pharmacy Admin Tracking Only    Program: Medication Refill  CPA in place:    Recommendation Provided To:   Intervention Detail: New Rx: 1, reason: Patient Preference  Intervention Accepted By:   Gap Closed?:    Time Spent (min): 5

## 2025-07-25 ENCOUNTER — TRANSCRIBE ORDERS (OUTPATIENT)
Facility: HOSPITAL | Age: 75
End: 2025-07-25

## 2025-07-25 DIAGNOSIS — Z12.31 VISIT FOR SCREENING MAMMOGRAM: Primary | ICD-10-CM

## (undated) DEVICE — SOLIDIFIER MEDC 1200ML -- CONVERT TO 356117

## (undated) DEVICE — SET GRAV CK VLV NEEDLESS ST 3 GANGED 4WAY STPCOCK HI FLO 10

## (undated) DEVICE — ELECTRODE,RADIOTRANSLUCENT,FOAM,3PK: Brand: MEDLINE

## (undated) DEVICE — BAG BELONG PT PERS CLEAR HANDL

## (undated) DEVICE — BAG SPEC BIOHZRD 10 X 10 IN --

## (undated) DEVICE — CATH IV AUTOGRD BC PNK 20GA 25 -- INSYTE

## (undated) DEVICE — Device

## (undated) DEVICE — (D)SENSOR RMFG 02 PULS OXMTR -- DISC BY MFR USE ITEM 133445

## (undated) DEVICE — SNARE ENDOSCP M L240CM W27MM SHTH DIA2.4MM CHN 2.8MM OVL

## (undated) DEVICE — 3M™ CUROS™ DISINFECTING CAP FOR NEEDLELESS CONNECTORS 270/CARTON 20 CARTONS/CASE CFF1-270: Brand: CUROS™

## (undated) DEVICE — 1200 GUARD II KIT W/5MM TUBE W/O VAC TUBE: Brand: GUARDIAN

## (undated) DEVICE — CUFF RMFG BP INF SZ 11 DISP -- LAWSON OEM ITEM 238915

## (undated) DEVICE — KIT COLON W/ 1.1OZ LUB AND 2 END

## (undated) DEVICE — SIMPLICITY FLUFF UNDERPAD 23X36, MODERATE: Brand: SIMPLICITY

## (undated) DEVICE — POLYP TRAP: Brand: TRAPEASE®

## (undated) DEVICE — BASIN EMSIS 16OZ GRAPHITE PLAS KID SHP MOLD GRAD FOR ORAL

## (undated) DEVICE — CONTAINER SPEC 20 ML LID NEUT BUFF FORMALIN 10 % POLYPR STS